# Patient Record
Sex: MALE | Race: WHITE | NOT HISPANIC OR LATINO | Employment: OTHER | URBAN - METROPOLITAN AREA
[De-identification: names, ages, dates, MRNs, and addresses within clinical notes are randomized per-mention and may not be internally consistent; named-entity substitution may affect disease eponyms.]

---

## 2017-02-07 ENCOUNTER — ALLSCRIPTS OFFICE VISIT (OUTPATIENT)
Dept: OTHER | Facility: OTHER | Age: 72
End: 2017-02-07

## 2017-05-01 ENCOUNTER — TRANSCRIBE ORDERS (OUTPATIENT)
Dept: LAB | Facility: CLINIC | Age: 72
End: 2017-05-01

## 2017-05-01 ENCOUNTER — APPOINTMENT (OUTPATIENT)
Dept: LAB | Facility: CLINIC | Age: 72
End: 2017-05-01
Payer: COMMERCIAL

## 2017-05-01 DIAGNOSIS — K21.9 GASTRO-ESOPHAGEAL REFLUX DISEASE WITHOUT ESOPHAGITIS: ICD-10-CM

## 2017-05-01 DIAGNOSIS — E78.00 PURE HYPERCHOLESTEROLEMIA: ICD-10-CM

## 2017-05-01 DIAGNOSIS — E11.9 TYPE 2 DIABETES MELLITUS WITHOUT COMPLICATIONS (HCC): ICD-10-CM

## 2017-05-01 DIAGNOSIS — E78.5 HYPERLIPIDEMIA: ICD-10-CM

## 2017-05-01 DIAGNOSIS — I10 ESSENTIAL (PRIMARY) HYPERTENSION: ICD-10-CM

## 2017-05-01 LAB
CHOLEST SERPL-MCNC: 148 MG/DL (ref 50–200)
EST. AVERAGE GLUCOSE BLD GHB EST-MCNC: 134 MG/DL
HBA1C MFR BLD: 6.3 % (ref 4.2–6.3)
HDLC SERPL-MCNC: 37 MG/DL (ref 40–60)
LDLC SERPL CALC-MCNC: 91 MG/DL (ref 0–100)
TRIGL SERPL-MCNC: 99 MG/DL

## 2017-05-01 PROCEDURE — 80061 LIPID PANEL: CPT

## 2017-05-01 PROCEDURE — 83036 HEMOGLOBIN GLYCOSYLATED A1C: CPT

## 2017-05-01 PROCEDURE — 36415 COLL VENOUS BLD VENIPUNCTURE: CPT

## 2017-05-02 ENCOUNTER — GENERIC CONVERSION - ENCOUNTER (OUTPATIENT)
Dept: OTHER | Facility: OTHER | Age: 72
End: 2017-05-02

## 2017-05-09 ENCOUNTER — ALLSCRIPTS OFFICE VISIT (OUTPATIENT)
Dept: OTHER | Facility: OTHER | Age: 72
End: 2017-05-09

## 2017-09-12 ENCOUNTER — GENERIC CONVERSION - ENCOUNTER (OUTPATIENT)
Dept: OTHER | Facility: OTHER | Age: 72
End: 2017-09-12

## 2017-11-08 ENCOUNTER — GENERIC CONVERSION - ENCOUNTER (OUTPATIENT)
Dept: FAMILY MEDICINE CLINIC | Facility: CLINIC | Age: 72
End: 2017-11-08

## 2017-11-08 LAB
LEFT EYE DIABETIC RETINOPATHY: NORMAL
RIGHT EYE DIABETIC RETINOPATHY: NORMAL

## 2017-11-09 DIAGNOSIS — E78.00 PURE HYPERCHOLESTEROLEMIA: ICD-10-CM

## 2017-11-09 DIAGNOSIS — E11.9 TYPE 2 DIABETES MELLITUS WITHOUT COMPLICATIONS (HCC): ICD-10-CM

## 2017-11-09 DIAGNOSIS — I10 ESSENTIAL (PRIMARY) HYPERTENSION: ICD-10-CM

## 2017-11-09 DIAGNOSIS — Z12.5 ENCOUNTER FOR SCREENING FOR MALIGNANT NEOPLASM OF PROSTATE: ICD-10-CM

## 2017-11-12 DIAGNOSIS — I10 ESSENTIAL (PRIMARY) HYPERTENSION: ICD-10-CM

## 2017-11-12 DIAGNOSIS — E11.9 TYPE 2 DIABETES MELLITUS WITHOUT COMPLICATIONS (HCC): ICD-10-CM

## 2017-11-12 DIAGNOSIS — E78.00 PURE HYPERCHOLESTEROLEMIA: ICD-10-CM

## 2018-01-02 ENCOUNTER — TRANSCRIBE ORDERS (OUTPATIENT)
Dept: LAB | Facility: CLINIC | Age: 73
End: 2018-01-02

## 2018-01-02 ENCOUNTER — APPOINTMENT (OUTPATIENT)
Dept: LAB | Facility: CLINIC | Age: 73
End: 2018-01-02
Payer: COMMERCIAL

## 2018-01-02 DIAGNOSIS — E11.9 TYPE 2 DIABETES MELLITUS WITHOUT COMPLICATIONS (HCC): ICD-10-CM

## 2018-01-02 DIAGNOSIS — Z12.5 ENCOUNTER FOR SCREENING FOR MALIGNANT NEOPLASM OF PROSTATE: ICD-10-CM

## 2018-01-02 DIAGNOSIS — E78.00 PURE HYPERCHOLESTEROLEMIA: ICD-10-CM

## 2018-01-02 DIAGNOSIS — I10 ESSENTIAL (PRIMARY) HYPERTENSION: ICD-10-CM

## 2018-01-02 LAB
ALBUMIN SERPL BCP-MCNC: 4 G/DL (ref 3.5–5)
ALP SERPL-CCNC: 69 U/L (ref 46–116)
ALT SERPL W P-5'-P-CCNC: 37 U/L (ref 12–78)
ANION GAP SERPL CALCULATED.3IONS-SCNC: 6 MMOL/L (ref 4–13)
AST SERPL W P-5'-P-CCNC: 26 U/L (ref 5–45)
BASOPHILS # BLD AUTO: 0.04 THOUSANDS/ΜL (ref 0–0.1)
BASOPHILS NFR BLD AUTO: 1 % (ref 0–1)
BILIRUB SERPL-MCNC: 0.66 MG/DL (ref 0.2–1)
BUN SERPL-MCNC: 21 MG/DL (ref 5–25)
CALCIUM SERPL-MCNC: 9.5 MG/DL (ref 8.3–10.1)
CHLORIDE SERPL-SCNC: 104 MMOL/L (ref 100–108)
CHOLEST SERPL-MCNC: 143 MG/DL (ref 50–200)
CO2 SERPL-SCNC: 29 MMOL/L (ref 21–32)
CREAT SERPL-MCNC: 1.08 MG/DL (ref 0.6–1.3)
CREAT UR-MCNC: 85 MG/DL
EOSINOPHIL # BLD AUTO: 0.35 THOUSAND/ΜL (ref 0–0.61)
EOSINOPHIL NFR BLD AUTO: 5 % (ref 0–6)
ERYTHROCYTE [DISTWIDTH] IN BLOOD BY AUTOMATED COUNT: 13 % (ref 11.6–15.1)
EST. AVERAGE GLUCOSE BLD GHB EST-MCNC: 137 MG/DL
GFR SERPL CREATININE-BSD FRML MDRD: 69 ML/MIN/1.73SQ M
GLUCOSE P FAST SERPL-MCNC: 115 MG/DL (ref 65–99)
HBA1C MFR BLD: 6.4 % (ref 4.2–6.3)
HCT VFR BLD AUTO: 47.2 % (ref 36.5–49.3)
HDLC SERPL-MCNC: 39 MG/DL (ref 40–60)
HGB BLD-MCNC: 16 G/DL (ref 12–17)
LDLC SERPL CALC-MCNC: 85 MG/DL (ref 0–100)
LYMPHOCYTES # BLD AUTO: 1.8 THOUSANDS/ΜL (ref 0.6–4.47)
LYMPHOCYTES NFR BLD AUTO: 27 % (ref 14–44)
MCH RBC QN AUTO: 30.2 PG (ref 26.8–34.3)
MCHC RBC AUTO-ENTMCNC: 33.9 G/DL (ref 31.4–37.4)
MCV RBC AUTO: 89 FL (ref 82–98)
MICROALBUMIN UR-MCNC: 18.5 MG/L (ref 0–20)
MICROALBUMIN/CREAT 24H UR: 22 MG/G CREATININE (ref 0–30)
MONOCYTES # BLD AUTO: 0.52 THOUSAND/ΜL (ref 0.17–1.22)
MONOCYTES NFR BLD AUTO: 8 % (ref 4–12)
NEUTROPHILS # BLD AUTO: 4.01 THOUSANDS/ΜL (ref 1.85–7.62)
NEUTS SEG NFR BLD AUTO: 59 % (ref 43–75)
NRBC BLD AUTO-RTO: 0 /100 WBCS
PLATELET # BLD AUTO: 307 THOUSANDS/UL (ref 149–390)
PMV BLD AUTO: 11.4 FL (ref 8.9–12.7)
POTASSIUM SERPL-SCNC: 4.3 MMOL/L (ref 3.5–5.3)
PROT SERPL-MCNC: 8.3 G/DL (ref 6.4–8.2)
PSA SERPL-MCNC: 0.4 NG/ML (ref 0–4)
RBC # BLD AUTO: 5.3 MILLION/UL (ref 3.88–5.62)
SODIUM SERPL-SCNC: 139 MMOL/L (ref 136–145)
TRIGL SERPL-MCNC: 97 MG/DL
WBC # BLD AUTO: 6.73 THOUSAND/UL (ref 4.31–10.16)

## 2018-01-02 PROCEDURE — 83036 HEMOGLOBIN GLYCOSYLATED A1C: CPT

## 2018-01-02 PROCEDURE — 82570 ASSAY OF URINE CREATININE: CPT

## 2018-01-02 PROCEDURE — 36415 COLL VENOUS BLD VENIPUNCTURE: CPT

## 2018-01-02 PROCEDURE — 85025 COMPLETE CBC W/AUTO DIFF WBC: CPT

## 2018-01-02 PROCEDURE — 80053 COMPREHEN METABOLIC PANEL: CPT

## 2018-01-02 PROCEDURE — 82043 UR ALBUMIN QUANTITATIVE: CPT

## 2018-01-02 PROCEDURE — 80061 LIPID PANEL: CPT

## 2018-01-02 PROCEDURE — G0103 PSA SCREENING: HCPCS

## 2018-01-04 ENCOUNTER — GENERIC CONVERSION - ENCOUNTER (OUTPATIENT)
Dept: OTHER | Facility: OTHER | Age: 73
End: 2018-01-04

## 2018-01-09 ENCOUNTER — ALLSCRIPTS OFFICE VISIT (OUTPATIENT)
Dept: OTHER | Facility: OTHER | Age: 73
End: 2018-01-09

## 2018-01-10 NOTE — PROGRESS NOTES
Assessment    1  Type 2 diabetes mellitus (250 00) (E11 9)   2  Hypercholesterolemia (272 0) (E78 0)   3  Benign essential hypertension (401 1) (I10)   4  Morbidly obese (278 01) (E66 01)    Plan  Benign essential hypertension    · Lisinopril-Hydrochlorothiazide 20-12 5 MG Oral Tablet; take 1 tablet by mouth once daily  Type 2 diabetes mellitus    · MetFORMIN HCl - 500 MG Oral Tablet; 1 TAB PO QD   · *VB-Foot Exam; Status:Resulted - Requires Verification;   Done: 80RQL7754 12:00AM   · *VB-Foot Exam ; every 1 year; Last 27DHD3448; Next 47EXK7274; Status:Active    Discussion/Summary  Discussion Summary:   [address] will follow up in the next 4 months and see how he does with weight loss  Counseling Documentation With Imm: The patient was counseled regarding diagnostic results, instructions for management, risks and benefits of treatment options  total time of encounter was 25 minutes and 15 minutes was spent counseling  Chief Complaint  Chief Complaint Free Text Note Form: Pt is here for a RTN  PT is up to date on his, lab work, DM foot exam, colonoscopy, fall risk, A1C , and urine albumin  Pt has no complaints         History of Present Illness  HPI: The patient is here for follow up and we discussed his DM control noting the her HgbA1c was mildly elevated  The patient states that he is trying to loose weight  He notes that he has issues with weight loss  We discussed the use of Metformin to help with weight loss and his DM  We discussed increased activity to help with weight loss  The patient states that there are no other concerns at this time  We reviewed the labs  The patient's BP is good today and he is tolerating his Lisinopril/HCTZ  Review of Systems  Complete-Male:   Constitutional: no fever, not feeling poorly and not feeling tired  Eyes: No complaints of eye pain, no red eyes, no discharge from eyes, no itchy eyes  ENT: no sore throat and no nasal discharge     Cardiovascular: no chest pain and no palpitations  Respiratory: no shortness of breath, no cough, no orthopnea and no shortness of breath during exertion  Gastrointestinal: no abdominal pain, no nausea, no vomiting and no diarrhea  Genitourinary: No complaints of dysuria, no incontinence, no hesitancy, no nocturia, no genital lesion, no testicular pain  Musculoskeletal: no arthralgias and no myalgias  Neurological: no headache, no numbness, no tingling and no dizziness  Psychiatric: Is not suicidal, no sleep disturbances, no anxiety or depression, no change in personality, no emotional problems  Endocrine: No complaints of proptosis, no hot flashes, no muscle weakness, no erectile dysfunction, no deepening of the voice, no feelings of weakness  Active Problems    1  Allergic rhinitis (477 9) (J30 9)   2  Benign essential hypertension (401 1) (I10)   3  Enlarged prostate with lower urinary tract symptoms (LUTS) (600 01) (N40 1)   4  Erectile dysfunction of non-organic origin (302 72) (F52 21)   5  Hypercholesterolemia (272 0) (E78 0)   6  Hyperlipidemia (272 4) (E78 5)   7  Peyronie's disease (607 85) (N48 6)   8  Type 2 diabetes mellitus (250 00) (E11 9)    Past Medical History    1  History of Foot pain, unspecified laterality (729 5) (M79 673)   2  History of sebaceous cyst (V13 3) (Z87 2)   3  History of Papular rash (782 1) (R21)   4  History of Testicular hypogonadism (257 2) (E29 1)  Active Problems And Past Medical History Reviewed: The active problems and past medical history were reviewed and updated today  Surgical History  Surgical History Reviewed: The surgical history was reviewed and updated today  Family History    1  Family history of Type 2 diabetes mellitus (250 00) (E11 9)  Family History Reviewed: The family history was reviewed and updated today  Social History    · Former smoker (K18 81) (E07 434)  Social History Reviewed: The social history was reviewed and updated today   The social history was reviewed and is unchanged  Current Meds   1  Aspirin 81 MG Oral Tablet Recorded   2  [facility] Fish Oil CAPS; Therapy: (Recorded:30Oct2013) to Recorded   3  Lisinopril-Hydrochlorothiazide 20-12 5 MG Oral Tablet; take 1 tablet by mouth once daily  Requested   for: 32EGH8650; Last Rx:33Jqz8920 Ordered   4  Nasacort Allergy 24HR 55 MCG/ACT Nasal Aerosol; Use a directed on the packaging; Therapy: 07SKN0185 to (Evaluate:75Arn4485); Last Rx:21Jln5470 Ordered   5  Omega-3-acid Ethyl Esters 1 GM Oral Capsule; Therapy: 27VSD6992 to Recorded   6  OneTouch Ultra Blue In [employer]; USE 1 STRIP DAILY; Therapy: 11VTP1862 to (Evaluate:42Uvw8679)  Requested for: 41VES8148; Last Rx:20Zfe2034   Ordered  Medication List Reviewed: The medication list was reviewed and updated today  Allergies    1  Statins    2  Seasonal    Vitals  Vital Signs [Data Includes: Current Encounter]    Recorded: 53Zqb0667 08:23AM   Temperature 96 9 F   Heart Rate 66   Respiration 17   Systolic 430   Diastolic 82   Height 5 ft 9 in   Weight 239 lb    BMI Calculated 35 29   BSA Calculated 2 23     Physical Exam    Constitutional   General appearance: No acute distress, well appearing and well nourished  Eyes   Conjunctiva and lids: No swelling, erythema, or discharge  Ears, Nose, Mouth, and Throat   External inspection of ears and nose: Normal     Oropharynx: Normal with no erythema, edema, exudate or lesions  Pulmonary   Respiratory effort: No increased work of breathing or signs of respiratory distress  Cardiovascular   Palpation of heart: Normal PMI, no thrills  Auscultation of heart: Normal rate and rhythm, normal S1 and S2, without murmurs  Abdomen   Abdomen: Non-tender, no masses  Lymphatic   Palpation of lymph nodes in neck: No lymphadenopathy  Musculoskeletal   Gait and station: Normal     Skin   Skin and subcutaneous tissue: Normal without rashes or lesions      Neurologic   Cranial nerves: Cranial nerves 2-12 intact  Psychiatric   Mood and affect: Normal     Diabetic Foot Screen: Abnormal     Socks and shoes removed, the Right Foot: the foot was not swollen, not tender, not erythematous, not warm, not dry, without maceration, without pre-ulcers and without a callus  The toes on the right were not swollen and not erythematous  The sensory exam showed  normal vibratory sensation at the level of the toes on the right  Normal tactile sensation with monofilament testing throughout the right foot  Socks and shoes removed, Left Foot: the foot was with a(n) 1 on dorsum of foot cm3 ulcer, but not swollen, not tender, not erythematous, not warm, not dry, wtihout maceration, without pre-ulcers and without a callus  The toes on the left were not swollen and not erythematous  The sensory exam showed  normal vibratory sensation at the level of the toes on the left , Normal tactile sensation with monofilament testing throughout the left foot  Capillary refills findings on the right were normal in the toes  Pulses:   2+ in the posterior tibialis on the right   2+ in the dorsalis pedis on the right  Capillary refills findings on the left were normal in the toes  Pulses:   2+ in the posterior tibialis on the left   2+ in the dorsalis pedis on the left  Assign Risk Category: 0: No loss of protective sensation, no deformity  No present risk  Health Management  History of Screening for colon cancer   (1) OCCULT BLOOD,FECES(SCREEN); every 1 year; Last 70Jzs9302; Next Due: 09BQT6698; Overdue  History of Screening for prostate cancer   (1) PSA (SCREEN) (Dx V76 44 Screen for Prostate Cancer); every 1 year; Last 56AZO5571; Next  Due: 27KQG8393; Active  Type 2 diabetes mellitus   (1) HEMOGLOBIN A1C; every 6 months; Last 34HBI2630; Next Due: 99Xfl0933; Active  (1) MICROALBUMIN CREATININE RATIO, RANDOM URINE; every 1 year; Last 31AGB4671; Next Due:  99ROG9353; Active  *VB - Eye Exam; every 1 year;  Last 76WJT2251; Next Due: G3978508; Overdue  *VB-Foot Exam; every 1 year; Last 28ZKB3349; Next Due: 96Jqh2067; Active  Health Maintenance   Medicare Annual Wellness Visit; every 1 year; Last 14CEH5180; Next Due: 12LDK8440;  Active    Signatures   Electronically signed by : Verna Paniagua DO; Feb 2 2016  9:18AM EST                       (Author)

## 2018-01-10 NOTE — RESULT NOTES
Verified Results  (1) MICROALBUMIN CREATININE RATIO, RANDOM URINE 31Aug2016 01:43PM Destiny Lundberg Order Number: [id number]     Test Name Result Flag Reference   MICROALBUMIN/ CREAT R 24 mg/g creatinine  0-30   MICROALBUMIN,URINE 50 4 mg/L H 0 0-20 0   CREATININE URINE 207 0 mg/dL         Plan  Type 2 diabetes mellitus    · (1) HEMOGLOBIN A1C ; every 6 months; Last 31Aug2016; Status:Active   · (1) MICROALBUMIN CREATININE RATIO, RANDOM URINE ; every 1 year;  Last  31Aug2016; Next 31Aug2017; Status:Active

## 2018-01-12 NOTE — RESULT NOTES
Verified Results  (1) CBC/PLT/DIFF 22Jan2016 08:27AM Alfred Farah     Test Name Result Flag Reference   WBC COUNT 6 25 Thousand/uL  4 31-10 16   RBC COUNT 5 43 Million/uL  3 88-5 62   HEMOGLOBIN 16 0 g/dL  12 0-17 0   HEMATOCRIT 47 6 %  36 5-49 3   MCV 87 7 fL  82 0-98 0   MCH 29 5 pg  26 8-34 3   MCHC 33 6 g/dL  31 4-37 4   RDW 13 0 %  11 6-15 1   MPV 11 3 fL  8 9-12 7   PLATELET COUNT 148 Thousands/uL  149-390   NEUTROPHILS RELATIVE PERCENT 57 %  43-75   LYMPHOCYTES RELATIVE PERCENT 28 %  14-44   MONOCYTES RELATIVE PERCENT 8 %  4-12   EOSINOPHILS RELATIVE PERCENT 6 %  0-6   BASOPHILS RELATIVE PERCENT 1 %  0-1   NEUTROPHILS ABSOLUTE COUNT 3 60 Thousands/µL  1 85-7 62   LYMPHOCYTES ABSOLUTE COUNT 1 76 Thousands/µL  0 60-4 47   MONOCYTES ABSOLUTE COUNT 0 49 Thousand/µL  0 17-1 22   EOSINOPHILS ABSOLUTE COUNT 0 36 Thousand/µL  0 00-0 61   BASOPHILS ABSOLUTE COUNT 0 04 Thousands/µL  0 00-0 10     (1) LIPID PANEL FASTING W DIRECT LDL REFLEX 22Jan2016 08:27ANAM Farah   Triglyceride:         Normal              <150 mg/dl       Borderline High    150-199 mg/dl       High               200-499 mg/dl       Very High          >499 mg/dl  Cholesterol:         Desirable        <200 mg/dl      Borderline High  200-239 mg/dl      High             >239 mg/dl  HDL Cholesterol:        High    >59 mg/dL      Low     <41 mg/dL  LDL Cholesterol:        Optimal          <100 mg/dl         Near Optimal     100-129 mg/dl        Above Optimal          Borderline High   130-159 mg/dl          High              160-189 mg/dl          Very High        >189 mg/dl  LDL CALCULATED:    This screening LDL is a calculated result  It does not have the accuracy of the Direct Measured LDL in the monitoring of patients with hyperlipidemia and/or statin therapy  Direct Measure LDL (DEB419) must be ordered separately in these patients       Test Name Result Flag Reference   CHOLESTEROL 154 mg/dL     LDL CHOLESTEROL CALCULATED 99 mg/dL 0-100   TRIGLYCERIDES 92 mg/dL  <=150   HDL,DIRECT 37 mg/dL L 40-60     (1) COMPREHENSIVE METABOLIC PANEL 84YAM3748 87:87LB [facility] Kidney Disease Education Program recommendations are as follows:  GFR calculation is accurate only with a steady state creatinine  Chronic Kidney disease less than 60 ml/min/1 73 sq  meters  Kidney failure less than 15 ml/min/1 73 sq  meters  Test Name Result Flag Reference   GLUCOSE,RANDM 129 mg/dL     If the patient is fasting, the ADA then defines impaired fasting glucose as > 100 mg/dL and diabetes as > or equal to 123 mg/dL     SODIUM 137 mmol/L  136-145   POTASSIUM 4 5 mmol/L  3 5-5 3   CHLORIDE 100 mmol/L  100-108   CARBON DIOXIDE 29 mmol/L  21-32   ANION GAP (CALC) 8 mmol/L  4-13   BLOOD UREA NITROGEN 18 mg/dL  5-25   CREATININE 0 98 mg/dL  0 60-1 30   Standardized to IDMS reference method   CALCIUM 9 5 mg/dL  8 3-10 1   BILI, TOTAL 0 60 mg/dL  0 20-1 00   ALK PHOSPHATAS 82 U/L     ALT (SGPT) 66 U/L  12-78   AST(SGOT) 29 U/L  5-45   ALBUMIN 4 0 g/dL  3 5-5 0   TOTAL PROTEIN 8 0 g/dL  6 4-8 2   eGFR Non-African American      >60 0 ml/min/1 73sq m     (1) PSA (SCREEN) (Dx V76 44 Screen for Prostate Cancer) 63SEI9167 08:27AM [place]     Test Name Result Flag Reference   PROSTATE SPECIFIC ANTIGEN 0 4 ng/mL  0 0-4 0     (1) MICROALBUMIN CREATININE RATIO, RANDOM URINE 22NLA4936 08:27AM [facility]     Test Name Result Flag Reference   MICROALBUMIN/ CREAT R 38 mg/g creatinine H 0-30   MICROALBUMIN,URINE 17 0 mg/L  0 0-20 0   CREATININE URINE 44 7 mg/dL       (1) HEMOGLOBIN A1C 22Jan2016 08:27AM [place]   5 7-6 4% impaired fasting glucose  >=6 5% diagnosis of diabetes    Falsely low levels are seen in conditions linked to short RBC life span-  hemolytic anemia, and splenomegaly  Falsely elevated levels are seen in situations where there is an increased production of RBC- receipt of erythropoietin or blood transfusions  Adopted from ADA-Clinical Practice Recommendations     Test Name Result Flag Reference   HEMOGLOBIN A1C 7 2 % H 4 0-5 6   EST  AVG  GLUCOSE 160 mg/dl         Plan  Screening for prostate cancer    · (1) PSA (SCREEN) (Dx V76 44 Screen for Prostate Cancer) ; every 1 year; Last  50ZJT7385; Next 73KNT3385; Status:Active  Type 2 diabetes mellitus    · (1) HEMOGLOBIN A1C ; every 6 months; Last 30RRG2993; Next 77Rrz0112; Status:Active   · (1) MICROALBUMIN CREATININE RATIO, RANDOM URINE ; every 1 year;  Last  85LQM4934; Next 15GWH7854; Status:Active

## 2018-01-13 VITALS
SYSTOLIC BLOOD PRESSURE: 128 MMHG | RESPIRATION RATE: 18 BRPM | DIASTOLIC BLOOD PRESSURE: 82 MMHG | BODY MASS INDEX: 34.99 KG/M2 | TEMPERATURE: 97 F | WEIGHT: 236.25 LBS | OXYGEN SATURATION: 94 % | HEIGHT: 69 IN | HEART RATE: 68 BPM

## 2018-01-15 VITALS
SYSTOLIC BLOOD PRESSURE: 110 MMHG | DIASTOLIC BLOOD PRESSURE: 70 MMHG | WEIGHT: 222.25 LBS | TEMPERATURE: 98 F | HEART RATE: 72 BPM | HEIGHT: 69 IN | RESPIRATION RATE: 16 BRPM | BODY MASS INDEX: 32.92 KG/M2

## 2018-01-16 NOTE — RESULT NOTES
Verified Results  (1) HEMOGLOBIN A1C 54GJL9159 07:03AM Pedro Blank Order Number: [id number]     Test Name Result Flag Reference   HEMOGLOBIN A1C 6 3 %  4 2-6 3   EST  AVG  GLUCOSE 134 mg/dl       (1) LIPID PANEL FASTING W DIRECT LDL REFLEX 74ALC9831 07:03AM Pedro Blank Order Number: [id number]     Test Name Result Flag Reference   CHOLESTEROL 148 mg/dL     LDL CHOLESTEROL CALCULATED 91 mg/dL  0-100   - Patient Instructions: This is a fasting blood test  Water, black tea or black coffee only after 9:00pm the night before test   Drink 2 glasses of water the morning of test       Triglyceride:         Normal              <150 mg/dl       Borderline High    150-199 mg/dl       High               200-499 mg/dl       Very High          >499 mg/dl  Cholesterol:         Desirable        <200 mg/dl      Borderline High  200-239 mg/dl      High             >239 mg/dl  HDL Cholesterol:        High    >59 mg/dL      Low     <41 mg/dL  LDL Cholesterol:        Optimal          <100 mg/dl        Near Optimal     100-129 mg/dl        Above Optimal          Borderline High   130-159 mg/dl          High              160-189 mg/dl          Very High        >189 mg/dl  LDL CALCULATED:    This screening LDL is a calculated result  It does not have the accuracy of the Direct Measured LDL in the monitoring of patients with hyperlipidemia and/or statin therapy  Direct Measure LDL (GYT800) must be ordered separately in these patients  TRIGLYCERIDES 99 mg/dL  <=150   Specimen collection should occur prior to N-Acetylcysteine or Metamizole administration due to the potential for falsely depressed results  HDL,DIRECT 37 mg/dL L 40-60   Specimen collection should occur prior to Metamizole administration due to the potential for falsely depressed results  Plan  Type 2 diabetes mellitus    · (1) HEMOGLOBIN A1C ; every 6 months;  Last 33ANF5876; Next 90SMD1351;  Status:Active

## 2018-01-17 NOTE — RESULT NOTES
Verified Results  * XR KNEE 4+ VW RIGHT INJURY 05DUC9182 10:53AM [place] Order Number: [id number]     Test Name Result Flag Reference   XR KNEE 4+ VW RIGHT (Report)     RIGHT KNEE     INDICATION: pt states pain in rt knee for 1 week, uncertain as to trauma, pain in medial aspect of rt knee     COMPARISON: None     VIEWS: 4; 4 images     FINDINGS:     There is no acute fracture or dislocation  There is no joint effusion  Moderate medial compartment degenerative changes with moderate joint space narrowing  No lytic or blastic lesions are seen  Soft tissues are unremarkable  IMPRESSION:     Moderate medial compartment degenerative changes with moderate joint space narrowing         Workstation performed: [id number]     Signed by:   Shakira Zhang MD   11/15/16

## 2018-01-18 NOTE — RESULT NOTES
Verified Results  (1) LYME ANTIBODY PROFILE [facility] TO [facility]ST[facility]OT 08WMS7547 09:39AM Mireya Toribio Order Number: [id number]     Test Name Result Flag Reference   LYME IGG 0 11  0 00-0 79   NEGATIVE(0 00-0 79)-Absence of detectable Borrelia IgG Antibodies  A negative result does not exclude the possibility of Borrelia infection  If early Lyme disease is suspected,a second sample should be collected & tested 4 weeks after initial testing  LYME IGM 0 51  0 00-0 79   NEGATIVE (0 00-0 79)-Absence of detectable Borrelia IgM antibodies  A negative result does not exclude the possibility of Borrelia infection  If early lyme disease is suspected, a second sample should be collected & tested 4 weeks after initial testing

## 2018-01-22 VITALS
TEMPERATURE: 97.6 F | WEIGHT: 222 LBS | HEART RATE: 60 BPM | SYSTOLIC BLOOD PRESSURE: 132 MMHG | HEIGHT: 69 IN | BODY MASS INDEX: 32.88 KG/M2 | RESPIRATION RATE: 16 BRPM | DIASTOLIC BLOOD PRESSURE: 70 MMHG

## 2018-01-22 VITALS
HEART RATE: 60 BPM | HEIGHT: 69 IN | RESPIRATION RATE: 16 BRPM | WEIGHT: 217.25 LBS | TEMPERATURE: 97.6 F | DIASTOLIC BLOOD PRESSURE: 84 MMHG | SYSTOLIC BLOOD PRESSURE: 130 MMHG | BODY MASS INDEX: 32.18 KG/M2

## 2018-01-23 NOTE — RESULT NOTES
Verified Results  (1) CBC/PLT/DIFF 25HWB1306 08:19AM Nona [facility] Order Number: [id number]     Test Name Result Flag Reference   WBC COUNT 6 73 Thousand/uL  4 31-10 16   RBC COUNT 5 30 Million/uL  3 88-5 62   HEMOGLOBIN 16 0 g/dL  12 0-17 0   HEMATOCRIT 47 2 %  36 5-49 3   MCV 89 fL  82-98   MCH 30 2 pg  26 8-34 3   MCHC 33 9 g/dL  31 4-37 4   RDW 13 0 %  11 6-15 1   MPV 11 4 fL  8 9-12 7   PLATELET COUNT 832 Thousands/uL  149-390   nRBC AUTOMATED 0 /100 WBCs     NEUTROPHILS RELATIVE PERCENT 59 %  43-75   LYMPHOCYTES RELATIVE PERCENT 27 %  14-44   MONOCYTES RELATIVE PERCENT 8 %  4-12   EOSINOPHILS RELATIVE PERCENT 5 %  0-6   BASOPHILS RELATIVE PERCENT 1 %  0-1   NEUTROPHILS ABSOLUTE COUNT 4 01 Thousands/? ??L  1 85-7 62   LYMPHOCYTES ABSOLUTE COUNT 1 80 Thousands/? ??L  0 60-4 47   MONOCYTES ABSOLUTE COUNT 0 52 Thousand/? ??L  0 17-1 22   EOSINOPHILS ABSOLUTE COUNT 0 35 Thousand/? ??L  0 00-0 61   BASOPHILS ABSOLUTE COUNT 0 04 Thousands/? ??L  0 00-0 10     (1) COMPREHENSIVE METABOLIC PANEL 13HOZ9017 93:90PY Nona [facility] Order Number: [id number]     Test Name Result Flag Reference   SODIUM 139 mmol/L  136-145   POTASSIUM 4 3 mmol/L  3 5-5 3   CHLORIDE 104 mmol/L  100-108   CARBON DIOXIDE 29 mmol/L  21-32   ANION GAP (CALC) 6 mmol/L  4-13   BLOOD UREA NITROGEN 21 mg/dL  5-25   CREATININE 1 08 mg/dL  0 60-1 30   Standardized to IDMS reference method   CALCIUM 9 5 mg/dL  8 3-10 1   BILI, TOTAL 0 66 mg/dL  0 20-1 00   ALK PHOSPHATAS 69 U/L     ALT (SGPT) 37 U/L  12-78   Specimen collection should occur prior to Sulfasalazine and/or Sulfapyridine administration due to the potential for falsely depressed results  AST(SGOT) 26 U/L  5-45   Specimen collection should occur prior to Sulfasalazine administration due to the potential for falsely depressed results     ALBUMIN 4 0 g/dL  3 5-5 0   TOTAL PROTEIN 8 3 g/dL H 6 4-8 2   eGFR 69 ml/min/1 73sq m     National Kidney Disease Education Program recommendations are as follows:  GFR calculation is accurate only with a steady state creatinine  Chronic Kidney disease less than 60 ml/min/1 73 sq  meters  Kidney failure less than 15 ml/min/1 73 sq  meters  GLUCOSE FASTING 115 mg/dL H 65-99   Specimen collection should occur prior to Sulfasalazine administration due to the potential for falsely depressed results  Specimen collection should occur prior to Sulfapyridine administration due to the potential for falsely elevated results  (1) LIPID PANEL, FASTING 02Jan2018 08:19AM [employer] Order Number: [id number]     Test Name Result Flag Reference   CHOLESTEROL 143 mg/dL     HDL,DIRECT 39 mg/dL L 40-60   Specimen collection should occur prior to Metamizole administration due to the potential for falsley depressed results  LDL CHOLESTEROL CALCULATED 85 mg/dL  0-100   Triglyceride:        Normal <150 mg/dl   Borderline High 150-199 mg/dl   High 200-499 mg/dl   Very High >499 mg/dl      Cholesterol:       Desirable <200 mg/dl    Borderline High 200-239 mg/dl    High >239 mg/dl      HDL Cholesterol:       High>59 mg/dL    Low <41 mg/dL      This screening LDL is a calculated result  It does not have the accuracy of the Direct Measured LDL in the monitoring of patients with hyperlipidemia and/or statin therapy  Direct Measure LDL (KZM115) must be ordered separately in these patients  TRIGLYCERIDES 97 mg/dL  <=150   Specimen collection should occur prior to N-Acetylcysteine or Metamizole administration due to the potential for falsely depressed results  (1) HEMOGLOBIN A1C 85AGR2913 08:19AM Cristhian Beau Order Number: [id number]     Test Name Result Flag Reference   HEMOGLOBIN A1C 6 4 % H 4 2-6 3   EST  AVG   GLUCOSE 137 mg/dl       (1) PSA (SCREEN) (Dx V76 44 Screen for Prostate Cancer) 54RJD0394 08:19AM Cristhian Beau Order Number: [id number]     Test Name Result Flag Reference   PROSTATE SPECIFIC ANTIGEN 0 4 ng/mL  0 0-4 0   American Urological Association Guidelines define biochemical recurrence of prostate cancer as a detectable or rising PSA value post-radical prostatectomy that is greater than or equal to 0 2 ng/mL with a second confirmatory level of greater than or equal to 0 2 ng/mL  (1) MICROALBUMIN CREATININE RATIO, RANDOM URINE 40YNF8580 08:19AM Ludin Galeano Order Number: [id number]     Test Name Result Flag Reference   MICROALBUMIN/ CREAT R 22 mg/g creatinine  0-30   MICROALBUMIN,URINE 18 5 mg/L  0 0-20 0   CREATININE URINE 85 0 mg/dL         Plan  PMH: Screening for prostate cancer    · (1) PSA (SCREEN) (Dx V76 44 Screen for Prostate Cancer) ; every 1 year; Last  92HLS2240; Next 75CZB3262; Status:Active  Type 2 diabetes mellitus    · (1) HEMOGLOBIN A1C ; every 6 months; Last 72ZSC6209; Next 05QQX9883; Status:Active   · (1) MICROALBUMIN CREATININE RATIO, RANDOM URINE ; every 1 year; Last  61LIF4518;  Next [id number]; Status:Active

## 2018-05-08 ENCOUNTER — TELEPHONE (OUTPATIENT)
Dept: INTERNAL MEDICINE CLINIC | Facility: CLINIC | Age: 73
End: 2018-05-08

## 2018-05-08 ENCOUNTER — APPOINTMENT (OUTPATIENT)
Dept: LAB | Facility: CLINIC | Age: 73
End: 2018-05-08
Payer: COMMERCIAL

## 2018-05-08 ENCOUNTER — OFFICE VISIT (OUTPATIENT)
Dept: FAMILY MEDICINE CLINIC | Facility: CLINIC | Age: 73
End: 2018-05-08
Payer: COMMERCIAL

## 2018-05-08 ENCOUNTER — TRANSCRIBE ORDERS (OUTPATIENT)
Dept: LAB | Facility: CLINIC | Age: 73
End: 2018-05-08

## 2018-05-08 VITALS
RESPIRATION RATE: 16 BRPM | TEMPERATURE: 97.9 F | HEART RATE: 52 BPM | BODY MASS INDEX: 33.33 KG/M2 | HEIGHT: 69 IN | SYSTOLIC BLOOD PRESSURE: 116 MMHG | DIASTOLIC BLOOD PRESSURE: 74 MMHG | WEIGHT: 225 LBS

## 2018-05-08 DIAGNOSIS — E11.9 TYPE 2 DIABETES MELLITUS WITHOUT COMPLICATION, WITHOUT LONG-TERM CURRENT USE OF INSULIN (HCC): ICD-10-CM

## 2018-05-08 DIAGNOSIS — E78.00 HYPERCHOLESTEROLEMIA: ICD-10-CM

## 2018-05-08 DIAGNOSIS — E11.9 TYPE 2 DIABETES MELLITUS WITHOUT COMPLICATION, WITHOUT LONG-TERM CURRENT USE OF INSULIN (HCC): Primary | ICD-10-CM

## 2018-05-08 DIAGNOSIS — I10 ESSENTIAL HYPERTENSION: ICD-10-CM

## 2018-05-08 PROBLEM — K21.9 ESOPHAGEAL REFLUX: Status: ACTIVE | Noted: 2017-02-07

## 2018-05-08 PROBLEM — B36.9 FUNGAL DERMATOSIS: Status: ACTIVE | Noted: 2018-01-09

## 2018-05-08 LAB
EST. AVERAGE GLUCOSE BLD GHB EST-MCNC: 154 MG/DL
HBA1C MFR BLD: 7 % (ref 4.2–6.3)

## 2018-05-08 PROCEDURE — 36415 COLL VENOUS BLD VENIPUNCTURE: CPT

## 2018-05-08 PROCEDURE — 83036 HEMOGLOBIN GLYCOSYLATED A1C: CPT

## 2018-05-08 PROCEDURE — 99214 OFFICE O/P EST MOD 30 MIN: CPT | Performed by: INTERNAL MEDICINE

## 2018-05-08 PROCEDURE — 3725F SCREEN DEPRESSION PERFORMED: CPT | Performed by: INTERNAL MEDICINE

## 2018-05-08 PROCEDURE — 1101F PT FALLS ASSESS-DOCD LE1/YR: CPT | Performed by: INTERNAL MEDICINE

## 2018-05-08 RX ORDER — LISINOPRIL AND HYDROCHLOROTHIAZIDE 20; 12.5 MG/1; MG/1
1 TABLET ORAL DAILY
COMMUNITY
End: 2018-11-21 | Stop reason: SDUPTHER

## 2018-05-08 RX ORDER — DIPHENHYDRAMINE HCL 25 MG
25 CAPSULE ORAL
COMMUNITY
End: 2021-01-05

## 2018-05-08 RX ORDER — TRIAMCINOLONE ACETONIDE 55 UG/1
SPRAY, METERED NASAL
COMMUNITY
Start: 2015-07-15

## 2018-05-08 RX ORDER — CHLORAL HYDRATE 500 MG
1000 CAPSULE ORAL DAILY
COMMUNITY
End: 2021-09-11

## 2018-05-08 RX ORDER — CLOTRIMAZOLE AND BETAMETHASONE DIPROPIONATE 10; .5 MG/ML; MG/ML
LOTION TOPICAL 2 TIMES DAILY
COMMUNITY
Start: 2012-01-11 | End: 2018-11-20

## 2018-05-08 NOTE — TELEPHONE ENCOUNTER
guillermo just called our office and stated that the newest diabetic medication that dr Jacinta Esparza sent cannot be covered and stated that [place] and [place] or trajenta probably would be covered, could you let him know please!

## 2018-05-08 NOTE — PROGRESS NOTES
Assessment/Plan:       Diagnoses and all orders for this visit:    Type 2 diabetes mellitus without complication, without long-term current use of insulin ([facility])  -     HEMOGLOBIN A1C W/ EAG ESTIMATION; Future  -     saxagliptin (ONGLYZA) 5 MG tablet; Take 1 tablet (5 mg total) by mouth daily for 180 days    Hypercholesterolemia    Essential hypertension    Other orders  -     aspirin 81 MG tablet; Take 81 mg by mouth once    -     clotrimazole-betamethasone (LOTRISONE) 1-0 05 % lotion; Apply topically Twice daily  -     Discontinue: sitaGLIPtin (JANUVIA) 100 mg tablet; Take 1 tablet by mouth daily  -     lisinopril-hydrochlorothiazide (PRINZIDE,ZESTORETIC) 20-12 5 MG per tablet; Take 1 tablet by mouth daily  -     Triamcinolone Acetonide (NASACORT ALLERGY 24HR) 55 MCG/ACT AERO; into each nostril  -     Discontinue: Omega-3-acid Ethyl Esters & D3 1 & 1000 GM & UNIT KIT; Take 2 capsules by mouth 2 (two) times a day  -     glucose blood (ONE TOUCH ULTRA TEST) test strip; 1 Squirt by In Vitro route daily  -     Omega-3 Fatty Acids (FISH OIL) 1,000 mg; Take 1,000 mg by mouth daily  -     diphenhydrAMINE (BENADRYL ALLERGY) 25 mg capsule; Take 25 mg by mouth daily at bedtime as needed for itching        No problem-specific Assessment & Plan notes found for this encounter  The patient will follow up in 4 months and we will see how he is doing with his DM with a HgbA1c, if possible, today  Subjective:      Patient ID: Dustin Adams is a 68 y o  male  The patient's cholesterol is doing well with diet control and his last LDL cholesterol in January was 85 mg/dL  The patient BP is good also and he notes no issues with the Prinizide  The patient states that there are no other issues noted no concerns  The patient would like to change the Januvia to Onglyza which is covered by the [place]  Hypertension   This is a chronic problem  The current episode started more than 1 year ago  The problem is unchanged   The problem is controlled  Pertinent negatives include no anxiety, blurred vision, chest pain, headaches, malaise/fatigue, neck pain, orthopnea, palpitations, peripheral edema, PND, shortness of breath or sweats  There are no associated agents to hypertension  Risk factors for coronary artery disease include male gender, obesity and dyslipidemia  Past treatments include ACE inhibitors and diuretics  The current treatment provides significant improvement  There are no compliance problems  There is no history of angina, kidney disease, CAD/MI, CVA, heart failure, PVD or retinopathy  Diabetes   He presents for his follow-up diabetic visit  He has type 2 diabetes mellitus  No MedicAlert identification noted  His disease course has been stable  There are no hypoglycemic associated symptoms  Pertinent negatives for hypoglycemia include no headaches or sweats  There are no diabetic associated symptoms  Pertinent negatives for diabetes include no blurred vision, no chest pain and no fatigue  Symptoms are stable  Diabetic complications include impotence  Pertinent negatives for diabetic complications include no CVA, nephropathy, peripheral neuropathy, PVD or retinopathy  Current diabetic treatment includes oral agent (monotherapy)  He is compliant with treatment all of the time  His weight is stable  He has not had a previous visit with a dietitian  There is no change in his home blood glucose trend  An ACE inhibitor/angiotensin II receptor blocker is being taken  He does not see a podiatrist Eye exam is not current  The following portions of the patient's history were reviewed and updated as appropriate:   He has no past medical history on file  ,   does not have any pertinent problems on file  ,   has a past surgical history that includes Wrist fusion (Bilateral) and Elbow surgery (Right)  ,  family history includes Diabetes type II in his mother; Heart disease in his mother; No Known Problems in his father; Rheumatic fever in his mother  ,   reports that he has quit smoking  He has never used smokeless tobacco  He reports that he drinks alcohol  He reports that he does not use drugs  ,  is allergic to pollen extract     Current Outpatient Prescriptions   Medication Sig Dispense Refill    aspirin 81 MG tablet Take 81 mg by mouth once        clotrimazole-betamethasone (LOTRISONE) 1-0 05 % lotion Apply topically Twice daily      diphenhydrAMINE (BENADRYL ALLERGY) 25 mg capsule Take 25 mg by mouth daily at bedtime as needed for itching      glucose blood (ONE TOUCH ULTRA TEST) test strip 1 Squirt by In Vitro route daily      lisinopril-hydrochlorothiazide (PRINZIDE,ZESTORETIC) 20-12 5 MG per tablet Take 1 tablet by mouth daily      Omega-3 Fatty Acids (FISH OIL) 1,000 mg Take 1,000 mg by mouth daily      Triamcinolone Acetonide (NASACORT ALLERGY 24HR) 55 MCG/ACT AERO into each nostril      saxagliptin (ONGLYZA) 5 MG tablet Take 1 tablet (5 mg total) by mouth daily for 180 days 90 tablet 1     No current facility-administered medications for this visit  Review of Systems   Constitutional: Negative for appetite change, fatigue, fever and malaise/fatigue  HENT: Negative for ear pain, sinus pain and sinus pressure  Eyes: Negative for blurred vision  Respiratory: Negative for choking and shortness of breath  Cardiovascular: Negative for chest pain, palpitations, orthopnea and PND  Gastrointestinal: Negative for abdominal pain, constipation, diarrhea, nausea and vomiting  Genitourinary: Positive for impotence  Musculoskeletal: Negative for arthralgias, myalgias and neck pain  Neurological: Negative for headaches  Hematological: Negative  Psychiatric/Behavioral: Negative            Objective:  Vitals:    05/08/18 0835   BP: 116/74   BP Location: Left arm   Patient Position: Sitting   Cuff Size: Large   Pulse: (!) 52   Resp: 16   Temp: 97 9 °F (36 6 °C)   TempSrc: Tympanic   Weight: 102 kg (225 lb)   Height: 5' 9" (1 753 m)     Body mass index is 33 23 kg/m²  Physical Exam   Constitutional: He is oriented to person, place, and time  He appears well-developed and well-nourished  HENT:   Head: Normocephalic and atraumatic  Eyes: Conjunctivae and EOM are normal  Pupils are equal, round, and reactive to light  Neck: Normal range of motion  Neck supple  Cardiovascular: Normal rate, regular rhythm, normal heart sounds and intact distal pulses  Pulmonary/Chest: Effort normal and breath sounds normal    Abdominal: Soft  Bowel sounds are normal    Musculoskeletal: Normal range of motion  Neurological: He is alert and oriented to person, place, and time  He has normal reflexes  Skin: Skin is warm and dry  Psychiatric: He has a normal mood and affect

## 2018-05-08 NOTE — TELEPHONE ENCOUNTER
This medication was specifically requested by the patient secondary to advice from his "[address]"  Inform him of the determination of [employer] and have him decide what he wants to do  Go back on Januvia or pay a pile of cash for the [employer] recommendation

## 2018-09-11 ENCOUNTER — TRANSCRIBE ORDERS (OUTPATIENT)
Dept: LAB | Facility: CLINIC | Age: 73
End: 2018-09-11

## 2018-09-11 ENCOUNTER — APPOINTMENT (OUTPATIENT)
Dept: LAB | Facility: CLINIC | Age: 73
End: 2018-09-11
Payer: COMMERCIAL

## 2018-09-11 ENCOUNTER — OFFICE VISIT (OUTPATIENT)
Dept: FAMILY MEDICINE CLINIC | Facility: CLINIC | Age: 73
End: 2018-09-11
Payer: COMMERCIAL

## 2018-09-11 VITALS
OXYGEN SATURATION: 92 % | BODY MASS INDEX: 33.18 KG/M2 | TEMPERATURE: 97.9 F | DIASTOLIC BLOOD PRESSURE: 70 MMHG | RESPIRATION RATE: 18 BRPM | HEART RATE: 60 BPM | WEIGHT: 224 LBS | SYSTOLIC BLOOD PRESSURE: 117 MMHG | HEIGHT: 69 IN

## 2018-09-11 DIAGNOSIS — E11.8 TYPE 2 DIABETES MELLITUS WITH COMPLICATION, WITHOUT LONG-TERM CURRENT USE OF INSULIN (HCC): Primary | ICD-10-CM

## 2018-09-11 DIAGNOSIS — I10 ESSENTIAL HYPERTENSION: ICD-10-CM

## 2018-09-11 DIAGNOSIS — E11.8 TYPE 2 DIABETES MELLITUS WITH COMPLICATION, WITHOUT LONG-TERM CURRENT USE OF INSULIN (HCC): ICD-10-CM

## 2018-09-11 PROBLEM — Z11.59 ENCOUNTER FOR HEPATITIS C SCREENING TEST FOR LOW RISK PATIENT: Status: ACTIVE | Noted: 2018-09-11

## 2018-09-11 LAB
EST. AVERAGE GLUCOSE BLD GHB EST-MCNC: 143 MG/DL
HBA1C MFR BLD: 6.6 % (ref 4.2–6.3)

## 2018-09-11 PROCEDURE — 83036 HEMOGLOBIN GLYCOSYLATED A1C: CPT

## 2018-09-11 PROCEDURE — 3074F SYST BP LT 130 MM HG: CPT | Performed by: INTERNAL MEDICINE

## 2018-09-11 PROCEDURE — 3078F DIAST BP <80 MM HG: CPT | Performed by: INTERNAL MEDICINE

## 2018-09-11 PROCEDURE — 3008F BODY MASS INDEX DOCD: CPT | Performed by: INTERNAL MEDICINE

## 2018-09-11 PROCEDURE — 1170F FXNL STATUS ASSESSED: CPT | Performed by: INTERNAL MEDICINE

## 2018-09-11 PROCEDURE — 1125F AMNT PAIN NOTED PAIN PRSNT: CPT | Performed by: INTERNAL MEDICINE

## 2018-09-11 PROCEDURE — 99213 OFFICE O/P EST LOW 20 MIN: CPT | Performed by: INTERNAL MEDICINE

## 2018-09-11 PROCEDURE — 86803 HEPATITIS C AB TEST: CPT

## 2018-09-11 PROCEDURE — 36415 COLL VENOUS BLD VENIPUNCTURE: CPT

## 2018-09-11 PROCEDURE — G0439 PPPS, SUBSEQ VISIT: HCPCS | Performed by: INTERNAL MEDICINE

## 2018-09-11 RX ORDER — GLIMEPIRIDE 2 MG/1
2 TABLET ORAL
Qty: 90 TABLET | Refills: 1 | Status: SHIPPED | OUTPATIENT
Start: 2018-09-11 | End: 2018-11-20 | Stop reason: SDUPTHER

## 2018-09-11 NOTE — PROGRESS NOTES
Assessment/Plan:       Diagnoses and all orders for this visit:    Type 2 diabetes mellitus with complication, without long-term current use of insulin ([facility])  -     HEMOGLOBIN A1C W/ EAG ESTIMATION; Future  -     Hepatitis C antibody; Future  -     glimepiride (AMARYL) 2 mg tablet; Take 1 tablet (2 mg total) by mouth daily with breakfast for 180 days    Other orders  -     Discontinue: sitaGLIPtin (JANUVIA) 100 mg tablet; Take 100 mg by mouth daily        No problem-specific Assessment & Plan notes found for this encounter  We will follow up in the next several weeks regarding the change to Glimepiride  Subjective:      Patient ID: Kira Rouse is a 68 y o  male  The patient states the Januvia is to expensive  He notes intolerance of the Metformin and we will try Glimepiride in its place  His HgbA1c is noted to be good  Diabetes   He presents for his follow-up diabetic visit  He has type 2 diabetes mellitus  His disease course has been stable  There are no hypoglycemic associated symptoms  There are no diabetic associated symptoms  Diabetic complications include peripheral neuropathy  Risk factors for coronary artery disease include hypertension, male sex and diabetes mellitus  Current diabetic treatment includes oral agent (monotherapy)  He is compliant with treatment all of the time  His weight is stable  When asked about meal planning, he reported none  He rarely participates in exercise  There is no change in his home blood glucose trend  An ACE inhibitor/angiotensin II receptor blocker is being taken  He sees a podiatrist Eye exam is current  The following portions of the patient's history were reviewed and updated as appropriate:   He has a past medical history of Arthritis of knee, right; Diabetes mellitus ([address] ); History of gastroesophageal reflux (GERD); Hypercholesterolemia; Morbid (severe) obesity due to excess calories ([address] );  Papular rash; Pes anserine bursitis; Sebaceous cyst; Testicular hypogonadism; and Tick bite ,   does not have any pertinent problems on file  ,   has a past surgical history that includes Wrist fusion (Bilateral) and Elbow surgery (Right)  ,  family history includes Diabetes type II in his mother; Heart disease in his mother; No Known Problems in his father; Rheumatic fever in his mother  ,   reports that he has quit smoking  He has never used smokeless tobacco  He reports that he drinks alcohol  He reports that he does not use drugs  ,  is allergic to pollen extract     Current Outpatient Prescriptions   Medication Sig Dispense Refill    aspirin 81 MG tablet Take 81 mg by mouth once        clotrimazole-betamethasone (LOTRISONE) 1-0 05 % lotion Apply topically Twice daily      diphenhydrAMINE (BENADRYL ALLERGY) 25 mg capsule Take 25 mg by mouth daily at bedtime as needed for itching      glucose blood (ONE TOUCH ULTRA TEST) test strip 1 Squirt by In Vitro route daily      lisinopril-hydrochlorothiazide (PRINZIDE,ZESTORETIC) 20-12 5 MG per tablet Take 1 tablet by mouth daily      Omega-3 Fatty Acids (FISH OIL) 1,000 mg Take 1,000 mg by mouth daily      Triamcinolone Acetonide (NASACORT ALLERGY 24HR) 55 MCG/ACT AERO into each nostril      glimepiride (AMARYL) 2 mg tablet Take 1 tablet (2 mg total) by mouth daily with breakfast for 180 days 90 tablet 1     No current facility-administered medications for this visit  Review of Systems   Constitutional: Negative  HENT: Negative  Eyes: Negative  Respiratory: Negative  Cardiovascular: Negative  Gastrointestinal: Negative  Musculoskeletal: Negative  Neurological: Negative  Psychiatric/Behavioral: Negative            Objective:  Vitals:    09/11/18 0825   BP: 117/70   BP Location: Left arm   Patient Position: Sitting   Cuff Size: Large   Pulse: 60   Resp: 18   Temp: 97 9 °F (36 6 °C)   TempSrc: Tympanic   SpO2: 92%   Weight: 102 kg (224 lb)   Height: 5' 9" (1 753 m)     Body mass index is 33 08 kg/m²  Physical Exam   Constitutional: He is oriented to person, place, and time  He appears well-developed and well-nourished  HENT:   Head: Normocephalic and atraumatic  Eyes: EOM are normal  Pupils are equal, round, and reactive to light  Neck: Normal range of motion  Neck supple  Cardiovascular: Normal rate, regular rhythm, normal heart sounds and intact distal pulses  Exam reveals no gallop  No murmur heard  Pulmonary/Chest: Effort normal and breath sounds normal  No respiratory distress  He has no wheezes  He has no rales  Abdominal: Soft  Bowel sounds are normal  He exhibits no distension  There is no tenderness  There is no rebound  Musculoskeletal: Normal range of motion  He exhibits no edema or tenderness  Neurological: He is alert and oriented to person, place, and time  No cranial nerve deficit  Coordination normal    Skin: Skin is warm and dry  Psychiatric: He has a normal mood and affect  His behavior is normal    Nursing note and vitals reviewed

## 2018-09-11 NOTE — PROGRESS NOTES
Assessment and Plan:    Problem List Items Addressed This Visit     None      Visit Diagnoses     Type 2 diabetes mellitus with complication, without long-term current use of insulin ([address] )    -  Primary    Relevant Medications    sitaGLIPtin (JANUVIA) 100 mg tablet        Health Maintenance Due   Topic Date Due    SLP PLAN OF CARE  1945    CRC Screening: FOBTx3/FIT  03/10/1996    Pneumococcal PPSV23/PCV13 65+ Years / Low and Medium Risk (2 of 2 - PCV13) 01/15/2014    Diabetic Foot Exam  02/07/2018    HEMOGLOBIN A1C  08/08/2018    INFLUENZA VACCINE  09/01/2018         HPI:  Carlos Wong is a 68 y o  male here for his Subsequent Wellness Visit      Patient Active Problem List   Diagnosis    Type 2 diabetes mellitus without complication, without long-term current use of insulin ([address] )    Essential hypertension    Allergic rhinitis    Arthritis of knee, right    Enlarged prostate with lower urinary tract symptoms (LUTS)    Gastroesophageal reflux disease without esophagitis    Fungal dermatosis    Hypercholesterolemia    Morbid obesity ([address] )    Peyronie's disease     Past Medical History:   Diagnosis Date    Arthritis of knee, right     LAST ASSESSED: 2/7/17    Diabetes mellitus ([address] )     History of gastroesophageal reflux (GERD)     LAST ASSESSED: 5/9/17    Hypercholesterolemia     LAST ASSESSED: AND RESOLVED: 1/9/18    Morbid (severe) obesity due to excess calories ([address] )     LAST ASSESSED: 5/10/16    Papular rash     RESOLVED: 2/1/16    Pes anserine bursitis     LAST ASSESSED: 11/29/16    Sebaceous cyst     LAST ASSESSED: 10/21/14    Testicular hypogonadism     LAST ASSESSED: 10/30/13    Tick bite     LAST ASSESSED: 11/14/16     Past Surgical History:   Procedure Laterality Date    ELBOW SURGERY Right     WRIST FUSION Bilateral      Family History   Problem Relation Age of Onset    Diabetes type II Mother         MELLITUS    Rheumatic fever Mother     Heart disease Mother     No Known Problems Father      History   Smoking Status    Former Smoker   Smokeless Tobacco    Never Used     History   Alcohol Use    Yes     Comment: [place]       History   Drug Use No       Current Outpatient Prescriptions   Medication Sig Dispense Refill    aspirin 81 MG tablet Take 81 mg by mouth once        clotrimazole-betamethasone (LOTRISONE) 1-0 05 % lotion Apply topically Twice daily      diphenhydrAMINE (BENADRYL ALLERGY) 25 mg capsule Take 25 mg by mouth daily at bedtime as needed for itching      glucose blood (ONE TOUCH ULTRA TEST) test strip 1 Squirt by In Vitro route daily      lisinopril-hydrochlorothiazide (PRINZIDE,ZESTORETIC) 20-12 5 MG per tablet Take 1 tablet by mouth daily      Omega-3 Fatty Acids (FISH OIL) 1,000 mg Take 1,000 mg by mouth daily      sitaGLIPtin (JANUVIA) 100 mg tablet Take 100 mg by mouth daily      Triamcinolone Acetonide (NASACORT ALLERGY 24HR) 55 MCG/ACT AERO into each nostril       No current facility-administered medications for this visit  Allergies   Allergen Reactions    Pollen Extract Allergic Rhinitis     Immunization History   Administered Date(s) Administered     Influenza (IM) Preservative Free 10/01/2014    Influenza 1945, 10/01/2014, 10/01/2017    Influenza Split High Dose Preservative Free IM 10/03/2015    Influenza TIV (IM) 10/01/2017    Pneumococcal Polysaccharide PPV23 01/15/2013    Tdap 01/21/2015    Zoster 01/15/2013       Patient Care Team:  Roxanne Reynolds DO as PCP - General    Medicare Screening Tests and Risk Assessments:  Warren Oshea is here for his Subsequent Wellness visit  Last Medicare Wellness visit information reviewed, patient interviewed, no change since last AWV  Health Risk Assessment:  Patient rates overall health as very good  Patient feels that their physical health rating is Same  Eyesight was rated as Same  Hearing was rated as Same  Patient feels that their emotional and mental health rating is Same   Pain experienced by patient in the last 7 days has been None  Patient states that he has experienced no weight loss or gain in last 6 months  Emotional/Mental Health:  Patient has been feeling nervous/anxious  PHQ-9 Depression Screening:    Frequency of the following problems over the past two weeks:      1  Little interest or pleasure in doing things: 0 - not at all      2  Feeling down, depressed, or hopeless: 0 - not at all  PHQ-2 Score: 0          Broken Bones/Falls: Fall Risk Assessment:    In the past year, patient has experienced: No history of falling in past year          Bladder/Bowel:  Patient has not leaked urine accidently in the last six months  Patient reports no loss of bowel control  Immunizations:  Patient has had a flu vaccination within the last year  Patient has received a pneumonia shot  Patient has received a shingles shot  Patient has received tetanus/diphtheria shot  Date of tetanus/diphtheria shot: 1/21/2015    Home Safety:  Patient does not have trouble with stairs inside or outside of their home  Patient currently reports that there are no safety hazards present in home, working smoke alarms, working carbon monoxide detectors  Preventative Screenings:   prostate cancer screen performed, colon cancer screen completed, cholesterol screen completed, glaucoma eye exam completed,     Nutrition:  Current diet: Unhealthy and Limited junk food with servings of the following:    Medications:  Patient is currently taking over-the-counter supplements  Patient is able to manage medications  Lifestyle Choices:  Patient reports no tobacco use  Patient has smoked or used tobacco in the past   Patient has stopped his tobacco use  Tobacco use quit date: 1986  Patient reports no alcohol use  Patient drives a vehicle  Patient wears seat belt          Activities of Daily Living:  Can get out of bed by his or her self, able to dress self, able to make own meals, able to do own shopping, able to bathe self, can do own laundry/housekeeping, can manage own money, pay bills and track expenses    Previous Hospitalizations:  No hospitalization or ED visit in past 12 months        Advanced Directives:  Patient has decided on a power of   Patient has spoken to designated power of   Patient has completed advanced directive  Preventative Screening/Counseling:      Cardiovascular:      General: Screening Current          Diabetes:      General: Screening Not Indicated          Colorectal Cancer:      General: Screening Current          Prostate Cancer:      General: Screening Current          Osteoporosis:      General: Screening Not Indicated          AAA:      General: Screening Not Indicated          Glaucoma:      General: Screening Current          HIV:      General: Screening Not Indicated          Hepatitis C:      General: Risks and Benefits Discussed        Advanced Directives:   Patient has living will for healthcare, has durable POA for healthcare, patient has an advanced directive  Information on ACP and/or AD not provided  No 5 wishes given  No end of life assessment reviewed with patient  Provider does not agree with end of life deisions       Immunizations:      Influenza: Influenza UTD This Year      Pneumococcal: Lifetime Vaccine Completed      Hepatitis B (Low risk patients): Series Not Indicated      Zostavax: Zostavax Vaccine UTD      TD: Td Vaccine UTD

## 2018-09-12 LAB — HCV AB SER QL: NORMAL

## 2018-09-18 ENCOUNTER — TELEPHONE (OUTPATIENT)
Dept: FAMILY MEDICINE CLINIC | Facility: CLINIC | Age: 73
End: 2018-09-18

## 2018-09-18 NOTE — TELEPHONE ENCOUNTER
Assessment/Plan:       Diagnoses and all orders for this visit:    Type 2 diabetes mellitus with complication, without long-term current use of insulin (HCC)  -     HEMOGLOBIN A1C W/ EAG ESTIMATION; Future  -     Hepatitis C antibody; Future  -     glimepiride (AMARYL) 2 mg tablet; Take 1 tablet (2 mg total) by mouth daily with breakfast for 180 days    Other orders  -     Discontinue: sitaGLIPtin (JANUVIA) 100 mg tablet; Take 100 mg by mouth daily        No problem-specific Assessment & Plan notes found for this encounter  We will follow up in the next several weeks regarding the change to Glimepiride  Subjective:      Patient ID: Darrian Randall is a 68 y o  male  The patient states the Januvia is to expensive  He notes intolerance of the Metformin and we will try Glimepiride in its place  His HgbA1c is noted to be good  Diabetes   He presents for his follow-up diabetic visit  He has type 2 diabetes mellitus  His disease course has been stable  There are no hypoglycemic associated symptoms  There are no diabetic associated symptoms  Diabetic complications include peripheral neuropathy  Risk factors for coronary artery disease include hypertension, male sex and diabetes mellitus  Current diabetic treatment includes oral agent (monotherapy)  He is compliant with treatment all of the time  His weight is stable  When asked about meal planning, he reported none  He rarely participates in exercise  There is no change in his home blood glucose trend  An ACE inhibitor/angiotensin II receptor blocker is being taken  He sees a podiatrist Eye exam is current  The following portions of the patient's history were reviewed and updated as appropriate:   He has a past medical history of Arthritis of knee, right; Diabetes mellitus ([address] ); History of gastroesophageal reflux (GERD); Hypercholesterolemia; Morbid (severe) obesity due to excess calories ([address] );  Papular rash; Pes anserine bursitis; Sebaceous cyst; Testicular hypogonadism; and Tick bite ,   does not have any pertinent problems on file  ,   has a past surgical history that includes Wrist fusion (Bilateral) and Elbow surgery (Right)  ,  family history includes Diabetes type II in his mother; Heart disease in his mother; No Known Problems in his father; Rheumatic fever in his mother  ,   reports that he has quit smoking  He has never used smokeless tobacco  He reports that he drinks alcohol  He reports that he does not use drugs  ,  is allergic to pollen extract     Current Outpatient Prescriptions   Medication Sig Dispense Refill    aspirin 81 MG tablet Take 81 mg by mouth once        clotrimazole-betamethasone (LOTRISONE) 1-0 05 % lotion Apply topically Twice daily      diphenhydrAMINE (BENADRYL ALLERGY) 25 mg capsule Take 25 mg by mouth daily at bedtime as needed for itching      glimepiride (AMARYL) 2 mg tablet Take 1 tablet (2 mg total) by mouth daily with breakfast for 180 days 90 tablet 1    glucose blood (ONE TOUCH ULTRA TEST) test strip 1 Squirt by In Vitro route daily      lisinopril-hydrochlorothiazide (PRINZIDE,ZESTORETIC) 20-12 5 MG per tablet Take 1 tablet by mouth daily      Omega-3 Fatty Acids (FISH OIL) 1,000 mg Take 1,000 mg by mouth daily      Triamcinolone Acetonide (NASACORT ALLERGY 24HR) 55 MCG/ACT AERO into each nostril       No current facility-administered medications for this visit  Review of Systems   Constitutional: Negative  HENT: Negative  Eyes: Negative  Respiratory: Negative  Cardiovascular: Negative  Gastrointestinal: Negative  Musculoskeletal: Negative  Neurological: Negative  Psychiatric/Behavioral: Negative            Objective:  Vitals:    09/11/18 0825   BP: 117/70   BP Location: Left arm   Patient Position: Sitting   Cuff Size: Large   Pulse: 60   Resp: 18   Temp: 97 9 °F (36 6 °C)   TempSrc: Tympanic   SpO2: 92%   Weight: 102 kg (224 lb)   Height: 5' 9" (1 753 m)     There is no height or weight on file to calculate BMI  Physical Exam   Constitutional: He is oriented to person, place, and time  He appears well-developed and well-nourished  HENT:   Head: Normocephalic and atraumatic  Eyes: EOM are normal  Pupils are equal, round, and reactive to light  Neck: Normal range of motion  Neck supple  Cardiovascular: Normal rate, regular rhythm, normal heart sounds and intact distal pulses  Exam reveals no gallop  No murmur heard  Pulmonary/Chest: Effort normal and breath sounds normal  No respiratory distress  He has no wheezes  He has no rales  Abdominal: Soft  Bowel sounds are normal  He exhibits no distension  There is no tenderness  There is no rebound  Musculoskeletal: Normal range of motion  He exhibits no edema or tenderness  Neurological: He is alert and oriented to person, place, and time  No cranial nerve deficit  Coordination normal    Skin: Skin is warm and dry  Psychiatric: He has a normal mood and affect  His behavior is normal    Nursing note and vitals reviewed  Assessment and Plan:    Problem List Items Addressed This Visit     None        Health Maintenance Due   Topic Date Due    SLP PLAN OF CARE  1945    CRC Screening: FOBTx3/FIT  03/10/1996    Pneumococcal PPSV23/PCV13 65+ Years / Low and Medium Risk (2 of 2 - PCV13) 01/15/2014    INFLUENZA VACCINE  09/01/2018         HPI:  Dante Zepeda is a 68 y o  male here for his Subsequent Wellness Visit      Patient Active Problem List   Diagnosis    Type 2 diabetes mellitus without complication, without long-term current use of insulin ([address] )    Essential hypertension    Allergic rhinitis    Arthritis of knee, right    Enlarged prostate with lower urinary tract symptoms (LUTS)    Gastroesophageal reflux disease without esophagitis    Fungal dermatosis    Hypercholesterolemia    Morbid obesity ([address] )    Peyronie's disease    Encounter for hepatitis C screening test for low risk patient     Past Medical History:   Diagnosis Date    Arthritis of knee, right     LAST ASSESSED: 2/7/17    Diabetes mellitus ([facility] )     History of gastroesophageal reflux (GERD)     LAST ASSESSED: 5/9/17    Hypercholesterolemia     LAST ASSESSED: AND RESOLVED: 1/9/18    Morbid (severe) obesity due to excess calories (HCC)     LAST ASSESSED: 5/10/16    Papular rash     RESOLVED: 2/1/16    Pes anserine bursitis     LAST ASSESSED: 11/29/16    Sebaceous cyst     LAST ASSESSED: 10/21/14    Testicular hypogonadism     LAST ASSESSED: 10/30/13    Tick bite     LAST ASSESSED: 11/14/16     Past Surgical History:   Procedure Laterality Date    ELBOW SURGERY Right     WRIST FUSION Bilateral      Family History   Problem Relation Age of Onset    Diabetes type II Mother         MELLITUS    Rheumatic fever Mother     Heart disease Mother     No Known Problems Father      History   Smoking Status    Former Smoker   Smokeless Tobacco    Never Used     History   Alcohol Use    Yes     Comment: Sravanthi       History   Drug Use No       Current Outpatient Prescriptions   Medication Sig Dispense Refill    aspirin 81 MG tablet Take 81 mg by mouth once        clotrimazole-betamethasone (LOTRISONE) 1-0 05 % lotion Apply topically Twice daily      diphenhydrAMINE (BENADRYL ALLERGY) 25 mg capsule Take 25 mg by mouth daily at bedtime as needed for itching      glimepiride (AMARYL) 2 mg tablet Take 1 tablet (2 mg total) by mouth daily with breakfast for 180 days 90 tablet 1    glucose blood (ONE TOUCH ULTRA TEST) test strip 1 Squirt by In Vitro route daily      lisinopril-hydrochlorothiazide (PRINZIDE,ZESTORETIC) 20-12 5 MG per tablet Take 1 tablet by mouth daily      Omega-3 Fatty Acids (FISH OIL) 1,000 mg Take 1,000 mg by mouth daily      Triamcinolone Acetonide (NASACORT ALLERGY 24HR) 55 MCG/ACT AERO into each nostril       No current facility-administered medications for this visit        Allergies   Allergen Reactions    Pollen Extract Allergic Rhinitis     Immunization History   Administered Date(s) Administered     Influenza (IM) Preservative Free 10/01/2014    Influenza 1945, 10/01/2014, 10/01/2017    Influenza Split High Dose Preservative Free IM 10/03/2015    Influenza TIV (IM) 10/01/2017    Pneumococcal Polysaccharide PPV23 01/15/2013    Tdap 01/21/2015    Zoster 01/15/2013       Patient Care Team:  Chas Hidalgo DO as PCP - General    Medicare Screening Tests and Risk Assessments:  Lauren Goldman is here for his Subsequent Wellness visit  Last Medicare Wellness visit information reviewed, patient interviewed, no change since last AWV  Health Risk Assessment:  Patient rates overall health as very good  Patient feels that their physical health rating is Same  Eyesight was rated as Same  Hearing was rated as Same  Patient feels that their emotional and mental health rating is Same  Pain experienced by patient in the last 7 days has been None  Patient states that he has experienced no weight loss or gain in last 6 months  Emotional/Mental Health:  Patient has been feeling nervous/anxious  PHQ-9 Depression Screening:    Frequency of the following problems over the past two weeks:      1  Little interest or pleasure in doing things: 0 - not at all      2  Feeling down, depressed, or hopeless: 0 - not at all  PHQ-2 Score: 0          Broken Bones/Falls: Fall Risk Assessment:    In the past year, patient has experienced: No history of falling in past year          Bladder/Bowel:  Patient has not leaked urine accidently in the last six months  Patient reports no loss of bowel control  Immunizations:  Patient has had a flu vaccination within the last year  Patient has received a pneumonia shot  Patient has received a shingles shot  Patient has received tetanus/diphtheria shot  Date of tetanus/diphtheria shot: 1/21/2015    Home Safety:  Patient does not have trouble with stairs inside or outside of their home  Patient currently reports that there are no safety hazards present in home, working smoke alarms, working carbon monoxide detectors  Preventative Screenings:   prostate cancer screen performed, colon cancer screen completed, cholesterol screen completed, glaucoma eye exam completed,     Nutrition:  Current diet: Unhealthy and Limited junk food with servings of the following:    Medications:  Patient is currently taking over-the-counter supplements  Patient is able to manage medications  Lifestyle Choices:  Patient reports no tobacco use  Patient has smoked or used tobacco in the past   Patient has stopped his tobacco use  Tobacco use quit date: 1986  Patient reports no alcohol use  Patient drives a vehicle  Patient wears seat belt  Activities of Daily Living:  Can get out of bed by his or her self, able to dress self, able to make own meals, able to do own shopping, able to bathe self, can do own laundry/housekeeping, can manage own money, pay bills and track expenses    Previous Hospitalizations:  No hospitalization or ED visit in past 12 months        Advanced Directives:  Patient has decided on a power of   Patient has spoken to designated power of   Patient has completed advanced directive  Preventative Screening/Counseling:      Cardiovascular:      General: Screening Current          Diabetes:      General: Screening Not Indicated          Colorectal Cancer:      General: Screening Current          Prostate Cancer:      General: Screening Current          Osteoporosis:      General: Screening Not Indicated          AAA:      General: Screening Not Indicated          Glaucoma:      General: Screening Current          HIV:      General: Screening Not Indicated          Hepatitis C:      General: Risks and Benefits Discussed        Advanced Directives:   Patient has living will for healthcare, has durable POA for healthcare, patient has an advanced directive  Information on ACP and/or AD not provided  No 5 wishes given  No end of life assessment reviewed with patient  Provider does not agree with end of life deisions       Immunizations:      Influenza: Influenza UTD This Year      Pneumococcal: Lifetime Vaccine Completed      Hepatitis B (Low risk patients): Series Not Indicated      Zostavax: Zostavax Vaccine UTD      TD: Td Vaccine UTD

## 2018-11-20 ENCOUNTER — OFFICE VISIT (OUTPATIENT)
Dept: FAMILY MEDICINE CLINIC | Facility: CLINIC | Age: 73
End: 2018-11-20
Payer: COMMERCIAL

## 2018-11-20 VITALS
HEART RATE: 60 BPM | BODY MASS INDEX: 34.33 KG/M2 | SYSTOLIC BLOOD PRESSURE: 120 MMHG | WEIGHT: 231.8 LBS | RESPIRATION RATE: 16 BRPM | DIASTOLIC BLOOD PRESSURE: 78 MMHG | TEMPERATURE: 97.9 F | HEIGHT: 69 IN

## 2018-11-20 DIAGNOSIS — E11.8 TYPE 2 DIABETES MELLITUS WITH COMPLICATION, WITHOUT LONG-TERM CURRENT USE OF INSULIN (HCC): ICD-10-CM

## 2018-11-20 DIAGNOSIS — Z23 NEED FOR INFLUENZA VACCINATION: ICD-10-CM

## 2018-11-20 DIAGNOSIS — K21.9 GASTROESOPHAGEAL REFLUX DISEASE WITHOUT ESOPHAGITIS: ICD-10-CM

## 2018-11-20 DIAGNOSIS — Z13.29 SCREENING FOR HYPOTHYROIDISM: ICD-10-CM

## 2018-11-20 DIAGNOSIS — I10 ESSENTIAL HYPERTENSION: ICD-10-CM

## 2018-11-20 DIAGNOSIS — E11.9 TYPE 2 DIABETES MELLITUS WITHOUT COMPLICATION, UNSPECIFIED WHETHER LONG TERM INSULIN USE (HCC): Primary | ICD-10-CM

## 2018-11-20 DIAGNOSIS — E78.00 HYPERCHOLESTEREMIA: ICD-10-CM

## 2018-11-20 DIAGNOSIS — N40.1 BENIGN PROSTATIC HYPERPLASIA WITH LOWER URINARY TRACT SYMPTOMS, SYMPTOM DETAILS UNSPECIFIED: ICD-10-CM

## 2018-11-20 PROCEDURE — 1036F TOBACCO NON-USER: CPT | Performed by: INTERNAL MEDICINE

## 2018-11-20 PROCEDURE — 3074F SYST BP LT 130 MM HG: CPT | Performed by: INTERNAL MEDICINE

## 2018-11-20 PROCEDURE — 99214 OFFICE O/P EST MOD 30 MIN: CPT | Performed by: INTERNAL MEDICINE

## 2018-11-20 PROCEDURE — 3008F BODY MASS INDEX DOCD: CPT | Performed by: INTERNAL MEDICINE

## 2018-11-20 PROCEDURE — 3078F DIAST BP <80 MM HG: CPT | Performed by: INTERNAL MEDICINE

## 2018-11-20 PROCEDURE — 1160F RVW MEDS BY RX/DR IN RCRD: CPT | Performed by: INTERNAL MEDICINE

## 2018-11-20 PROCEDURE — 4040F PNEUMOC VAC/ADMIN/RCVD: CPT | Performed by: INTERNAL MEDICINE

## 2018-11-20 RX ORDER — GLIMEPIRIDE 2 MG/1
2 TABLET ORAL
Qty: 90 TABLET | Refills: 1 | Status: SHIPPED | OUTPATIENT
Start: 2018-11-20 | End: 2019-03-26

## 2018-11-20 NOTE — PROGRESS NOTES
Assessment/Plan:       Diagnoses and all orders for this visit:    Type 2 diabetes mellitus without complication, unspecified whether long term insulin use (HCC)  -     CBC and differential; Future  -     Comprehensive metabolic panel; Future    Essential hypertension    Hypercholesteremia  -     Lipid Panel with Direct LDL reflex; Future    Gastroesophageal reflux disease without esophagitis    Screening for hypothyroidism    Benign prostatic hyperplasia with lower urinary tract symptoms, symptom details unspecified  -     PSA, Total Screen; Future    Need for influenza vaccination    Type 2 diabetes mellitus with complication, without long-term current use of insulin (HCC)  -     glimepiride (AMARYL) 2 mg tablet; Take 1 tablet (2 mg total) by mouth daily with breakfast for 180 days    Other orders  -     Cancel: TSH, 3rd generation; Future  -     Cancel: PSA, total and free; Future  -     Cancel: influenza vaccine, [id number], high-dose, PF 0 5 mL, for patients 65 yr+ (FLUZONE HIGH-DOSE)        No problem-specific Assessment & Plan notes found for this encounter  We will followup in the next 4 months and see how things go  Subjective:      Patient ID: Sharmin English is a 68 y o  male  The patient was seen and examined and noted to have issues with DM  He noted myalgia with the metformin and he is doing well since it was discharged  The patient states that he has no concerns regarding his glucose control  He notes that his fasting has averaged 115 mg/dL  The patient's BP is good and he notes no issues with HTN  He statse no orthostatic symptoms  His GERD is also well controlled without medications  Hypertension   This is a chronic problem  The current episode started more than 1 year ago  The problem is unchanged  The problem is controlled   Pertinent negatives include no anxiety, blurred vision, chest pain, headaches, malaise/fatigue, neck pain, orthopnea, palpitations, peripheral edema, PND, shortness of breath or sweats  There are no associated agents to hypertension  Risk factors for coronary artery disease include male gender and diabetes mellitus  Past treatments include diuretics and ACE inhibitors  The current treatment provides significant improvement  There are no compliance problems  There is no history of angina, kidney disease, CAD/MI, CVA, heart failure, left ventricular hypertrophy, PVD or retinopathy  Diabetes   He presents for his follow-up diabetic visit  He has type 2 diabetes mellitus  His disease course has been stable  There are no hypoglycemic associated symptoms  Pertinent negatives for hypoglycemia include no headaches or sweats  Pertinent negatives for diabetes include no blurred vision, no chest pain and no fatigue  Pertinent negatives for diabetic complications include no autonomic neuropathy, CVA, heart disease, impotence, nephropathy, peripheral neuropathy, PVD or retinopathy  Current diabetic treatment includes oral agent (monotherapy)  He is compliant with treatment all of the time  His weight is stable  His home blood glucose trend is decreasing steadily  An ACE inhibitor/angiotensin II receptor blocker is being taken  The following portions of the patient's history were reviewed and updated as appropriate:   He has a past medical history of Arthritis of knee, right; Diabetes mellitus ([address] ); History of gastroesophageal reflux (GERD); Hypercholesterolemia; Morbid (severe) obesity due to excess calories ([address] ); Papular rash; Pes anserine bursitis; Sebaceous cyst; Testicular hypogonadism; and Tick bite ,   does not have any pertinent problems on file  ,   has a past surgical history that includes Wrist fusion (Bilateral) and Elbow surgery (Right)  ,  family history includes Diabetes type II in his mother; Heart disease in his mother; No Known Problems in his father; Rheumatic fever in his mother  ,   reports that he has quit smoking   He has never used smokeless tobacco  He reports that he drinks alcohol  He reports that he does not use drugs  ,  is allergic to pollen extract     Current Outpatient Prescriptions   Medication Sig Dispense Refill    aspirin 81 MG tablet Take 81 mg by mouth once        diphenhydrAMINE (BENADRYL ALLERGY) 25 mg capsule Take 25 mg by mouth daily at bedtime as needed for itching      glimepiride (AMARYL) 2 mg tablet Take 1 tablet (2 mg total) by mouth daily with breakfast for 180 days 90 tablet 1    glucose blood (ONE TOUCH ULTRA TEST) test strip 1 Squirt by In Vitro route daily      lisinopril-hydrochlorothiazide (PRINZIDE,ZESTORETIC) 20-12 5 MG per tablet Take 1 tablet by mouth daily      Omega-3 Fatty Acids (FISH OIL) 1,000 mg Take 1,000 mg by mouth daily      Triamcinolone Acetonide (NASACORT ALLERGY 24HR) 55 MCG/ACT AERO into each nostril       No current facility-administered medications for this visit  Review of Systems   Constitutional: Negative for chills, fatigue, fever and malaise/fatigue  HENT: Negative  Eyes: Negative for blurred vision  Respiratory: Negative for cough, chest tightness and shortness of breath  Cardiovascular: Negative for chest pain, palpitations, orthopnea, leg swelling and PND  Gastrointestinal: Negative for abdominal pain, constipation, diarrhea, nausea and vomiting  Genitourinary: Negative for impotence  Musculoskeletal: Negative for arthralgias, joint swelling, myalgias and neck pain  Neurological: Negative for headaches  Psychiatric/Behavioral: Negative  Objective:  Vitals:    11/20/18 0922   BP: 120/78   BP Location: Left arm   Patient Position: Sitting   Cuff Size: Large   Pulse: 60   Resp: 16   Temp: 97 9 °F (36 6 °C)   TempSrc: Tympanic   Weight: 105 kg (231 lb 12 8 oz)   Height: 5' 9" (1 753 m)     Body mass index is 34 23 kg/m²  Physical Exam   Constitutional: He is oriented to person, place, and time  He appears well-nourished     HENT:   Head: Normocephalic and atraumatic  Eyes: Pupils are equal, round, and reactive to light  Conjunctivae and EOM are normal    Neck: Normal range of motion  Neck supple  Cardiovascular: Normal rate, regular rhythm, normal heart sounds and intact distal pulses  Exam reveals no gallop  No murmur heard  Pulmonary/Chest: Effort normal and breath sounds normal  No respiratory distress  He has no wheezes  He has no rales  Abdominal: Soft  Bowel sounds are normal  He exhibits no distension  There is no tenderness  Musculoskeletal: Normal range of motion  He exhibits no edema or tenderness  Neurological: He is alert and oriented to person, place, and time  Skin: Skin is warm and dry  Psychiatric: He has a normal mood and affect  Nursing note and vitals reviewed

## 2018-11-21 DIAGNOSIS — I10 ESSENTIAL HYPERTENSION: Primary | ICD-10-CM

## 2018-11-25 RX ORDER — LISINOPRIL AND HYDROCHLOROTHIAZIDE 20; 12.5 MG/1; MG/1
1 TABLET ORAL DAILY
Qty: 90 TABLET | Refills: 1 | Status: SHIPPED | OUTPATIENT
Start: 2018-11-25 | End: 2019-05-05 | Stop reason: SDUPTHER

## 2019-03-18 ENCOUNTER — APPOINTMENT (OUTPATIENT)
Dept: LAB | Facility: CLINIC | Age: 74
End: 2019-03-18
Payer: COMMERCIAL

## 2019-03-18 ENCOUNTER — TRANSCRIBE ORDERS (OUTPATIENT)
Dept: LAB | Facility: CLINIC | Age: 74
End: 2019-03-18

## 2019-03-18 DIAGNOSIS — N40.1 BENIGN PROSTATIC HYPERPLASIA WITH LOWER URINARY TRACT SYMPTOMS, SYMPTOM DETAILS UNSPECIFIED: ICD-10-CM

## 2019-03-18 DIAGNOSIS — E78.00 HYPERCHOLESTEREMIA: ICD-10-CM

## 2019-03-18 DIAGNOSIS — E11.9 TYPE 2 DIABETES MELLITUS WITHOUT COMPLICATION, UNSPECIFIED WHETHER LONG TERM INSULIN USE (HCC): ICD-10-CM

## 2019-03-18 LAB
ALBUMIN SERPL BCP-MCNC: 4 G/DL (ref 3.5–5)
ALP SERPL-CCNC: 78 U/L (ref 46–116)
ALT SERPL W P-5'-P-CCNC: 56 U/L (ref 12–78)
ANION GAP SERPL CALCULATED.3IONS-SCNC: 2 MMOL/L (ref 4–13)
AST SERPL W P-5'-P-CCNC: 31 U/L (ref 5–45)
BASOPHILS # BLD AUTO: 0.05 THOUSANDS/ΜL (ref 0–0.1)
BASOPHILS NFR BLD AUTO: 1 % (ref 0–1)
BILIRUB SERPL-MCNC: 0.63 MG/DL (ref 0.2–1)
BUN SERPL-MCNC: 17 MG/DL (ref 5–25)
CALCIUM SERPL-MCNC: 9.8 MG/DL (ref 8.3–10.1)
CHLORIDE SERPL-SCNC: 104 MMOL/L (ref 100–108)
CHOLEST SERPL-MCNC: 170 MG/DL (ref 50–200)
CO2 SERPL-SCNC: 29 MMOL/L (ref 21–32)
CREAT SERPL-MCNC: 1.13 MG/DL (ref 0.6–1.3)
EOSINOPHIL # BLD AUTO: 0.53 THOUSAND/ΜL (ref 0–0.61)
EOSINOPHIL NFR BLD AUTO: 8 % (ref 0–6)
ERYTHROCYTE [DISTWIDTH] IN BLOOD BY AUTOMATED COUNT: 12.9 % (ref 11.6–15.1)
GFR SERPL CREATININE-BSD FRML MDRD: 64 ML/MIN/1.73SQ M
GLUCOSE P FAST SERPL-MCNC: 134 MG/DL (ref 65–99)
HCT VFR BLD AUTO: 53.6 % (ref 36.5–49.3)
HDLC SERPL-MCNC: 33 MG/DL (ref 40–60)
HGB BLD-MCNC: 17.7 G/DL (ref 12–17)
IMM GRANULOCYTES # BLD AUTO: 0.02 THOUSAND/UL (ref 0–0.2)
IMM GRANULOCYTES NFR BLD AUTO: 0 % (ref 0–2)
LDLC SERPL CALC-MCNC: 108 MG/DL (ref 0–100)
LYMPHOCYTES # BLD AUTO: 1.83 THOUSANDS/ΜL (ref 0.6–4.47)
LYMPHOCYTES NFR BLD AUTO: 27 % (ref 14–44)
MCH RBC QN AUTO: 30.5 PG (ref 26.8–34.3)
MCHC RBC AUTO-ENTMCNC: 33 G/DL (ref 31.4–37.4)
MCV RBC AUTO: 92 FL (ref 82–98)
MONOCYTES # BLD AUTO: 0.49 THOUSAND/ΜL (ref 0.17–1.22)
MONOCYTES NFR BLD AUTO: 7 % (ref 4–12)
NEUTROPHILS # BLD AUTO: 3.91 THOUSANDS/ΜL (ref 1.85–7.62)
NEUTS SEG NFR BLD AUTO: 57 % (ref 43–75)
NRBC BLD AUTO-RTO: 0 /100 WBCS
PLATELET # BLD AUTO: 277 THOUSANDS/UL (ref 149–390)
PMV BLD AUTO: 11.3 FL (ref 8.9–12.7)
POTASSIUM SERPL-SCNC: 4.7 MMOL/L (ref 3.5–5.3)
PROT SERPL-MCNC: 8.4 G/DL (ref 6.4–8.2)
PSA SERPL-MCNC: 0.5 NG/ML (ref 0–4)
RBC # BLD AUTO: 5.81 MILLION/UL (ref 3.88–5.62)
SODIUM SERPL-SCNC: 135 MMOL/L (ref 136–145)
TRIGL SERPL-MCNC: 146 MG/DL
WBC # BLD AUTO: 6.83 THOUSAND/UL (ref 4.31–10.16)

## 2019-03-18 PROCEDURE — G0103 PSA SCREENING: HCPCS

## 2019-03-18 PROCEDURE — 80061 LIPID PANEL: CPT

## 2019-03-18 PROCEDURE — 80053 COMPREHEN METABOLIC PANEL: CPT

## 2019-03-18 PROCEDURE — 36415 COLL VENOUS BLD VENIPUNCTURE: CPT

## 2019-03-18 PROCEDURE — 85025 COMPLETE CBC W/AUTO DIFF WBC: CPT

## 2019-03-26 ENCOUNTER — OFFICE VISIT (OUTPATIENT)
Dept: FAMILY MEDICINE CLINIC | Facility: CLINIC | Age: 74
End: 2019-03-26
Payer: COMMERCIAL

## 2019-03-26 VITALS
RESPIRATION RATE: 20 BRPM | WEIGHT: 236.2 LBS | DIASTOLIC BLOOD PRESSURE: 84 MMHG | HEIGHT: 69 IN | BODY MASS INDEX: 34.98 KG/M2 | SYSTOLIC BLOOD PRESSURE: 120 MMHG | HEART RATE: 60 BPM | TEMPERATURE: 97.3 F

## 2019-03-26 DIAGNOSIS — E11.9 TYPE 2 DIABETES MELLITUS WITHOUT COMPLICATION, UNSPECIFIED WHETHER LONG TERM INSULIN USE (HCC): Primary | ICD-10-CM

## 2019-03-26 DIAGNOSIS — E66.9 OBESITY (BMI 30.0-34.9): ICD-10-CM

## 2019-03-26 DIAGNOSIS — E11.9 TYPE 2 DIABETES MELLITUS WITHOUT COMPLICATION, UNSPECIFIED WHETHER LONG TERM INSULIN USE (HCC): ICD-10-CM

## 2019-03-26 DIAGNOSIS — D75.1 POLYCYTHEMIA: ICD-10-CM

## 2019-03-26 DIAGNOSIS — E78.00 HYPERCHOLESTEROLEMIA: ICD-10-CM

## 2019-03-26 LAB — SL AMB POCT HEMOGLOBIN AIC: 7.1 (ref ?–6.5)

## 2019-03-26 PROCEDURE — 3008F BODY MASS INDEX DOCD: CPT | Performed by: INTERNAL MEDICINE

## 2019-03-26 PROCEDURE — 83036 HEMOGLOBIN GLYCOSYLATED A1C: CPT | Performed by: INTERNAL MEDICINE

## 2019-03-26 PROCEDURE — 1036F TOBACCO NON-USER: CPT | Performed by: INTERNAL MEDICINE

## 2019-03-26 PROCEDURE — 3045F PR MOST RECENT HEMOGLOBIN A1C LEVEL 7.0-9.0%: CPT | Performed by: INTERNAL MEDICINE

## 2019-03-26 PROCEDURE — 1160F RVW MEDS BY RX/DR IN RCRD: CPT | Performed by: INTERNAL MEDICINE

## 2019-03-26 PROCEDURE — 99214 OFFICE O/P EST MOD 30 MIN: CPT | Performed by: INTERNAL MEDICINE

## 2019-03-26 RX ORDER — ATORVASTATIN CALCIUM 10 MG/1
10 TABLET, FILM COATED ORAL DAILY
Qty: 90 TABLET | Refills: 1 | Status: SHIPPED | OUTPATIENT
Start: 2019-03-26 | End: 2019-07-01 | Stop reason: SDUPTHER

## 2019-03-26 NOTE — PATIENT INSTRUCTIONS
Obesity   AMBULATORY CARE:   Obesity  is when your body mass index (BMI) is greater than 30  Your healthcare provider will use your height and weight to measure your BMI  The risks of obesity include  many health problems, such as injuries or physical disability  You may need tests to check for the following:  · Diabetes     · High blood pressure or high cholesterol     · Heart disease     · Gallbladder or liver disease     · Cancer of the colon, breast, prostate, liver, or kidney     · Sleep apnea     · Arthritis or gout  Seek care immediately if:   · You have a severe headache, confusion, or difficulty speaking  · You have weakness on one side of your body  · You have chest pain, sweating, or shortness of breath  Contact your healthcare provider if:   · You have symptoms of gallbladder or liver disease, such as pain in your upper abdomen  · You have knee or hip pain and discomfort while walking  · You have symptoms of diabetes, such as intense hunger and thirst, and frequent urination  · You have symptoms of sleep apnea, such as snoring or daytime sleepiness  · You have questions or concerns about your condition or care  Treatment for obesity  focuses on helping you lose weight to improve your health  Even a small decrease in BMI can reduce the risk for many health problems  Your healthcare provider will help you set a weight-loss goal   · Lifestyle changes  are the first step in treating obesity  These include making healthy food choices and getting regular physical activity  Your healthcare provider may suggest a weight-loss program that involves coaching, education, and therapy  · Medicine  may help you lose weight when it is used with a healthy diet and physical activity  · Surgery  can help you lose weight if you are very obese and have other health problems  There are several types of weight-loss surgery  Ask your healthcare provider for more information    Be successful losing weight:   · Set small, realistic goals  An example of a small goal is to walk for 20 minutes 5 days a week  Anther goal is to lose 5% of your body weight  · Tell friends, family members, and coworkers about your goals  and ask for their support  Ask a friend to lose weight with you, or join a weight-loss support group  · Identify foods or triggers that may cause you to overeat , and find ways to avoid them  Remove tempting high-calorie foods from your home and workplace  Place a bowl of fresh fruit on your kitchen counter  If stress causes you to eat, then find other ways to cope with stress  · Keep a diary to track what you eat and drink  Also write down how many minutes of physical activity you do each day  Weigh yourself once a week and record it in your diary  Eating changes: You will need to eat 500 to 1,000 fewer calories each day than you currently eat to lose 1 to 2 pounds a week  The following changes will help you cut calories:  · Eat smaller portions  Use small plates, no larger than 9 inches in diameter  Fill your plate half full of fruits and vegetables  Measure your food using measuring cups until you know what a serving size looks like  · Eat 3 meals and 1 or 2 snacks each day  Plan your meals in advance  Thania Dry and eat at home most of the time  Eat slowly  · Eat fruits and vegetables at every meal   They are low in calories and high in fiber, which makes you feel full  Do not add butter, margarine, or cream sauce to vegetables  Use herbs to season steamed vegetables  · Eat less fat and fewer fried foods  Eat more baked or grilled chicken and fish  These protein sources are lower in calories and fat than red meat  Limit fast food  Dress your salads with olive oil and vinegar instead of bottled dressing  · Limit the amount of sugar you eat  Do not drink sugary beverages  Limit alcohol  Activity changes:  Physical activity is good for your body in many ways   It helps you burn calories and build strong muscles  It decreases stress and depression, and improves your mood  It can also help you sleep better  Talk to your healthcare provider before you begin an exercise program   · Exercise for at least 30 minutes 5 days a week  Start slowly  Set aside time each day for physical activity that you enjoy and that is convenient for you  It is best to do both weight training and an activity that increases your heart rate, such as walking, bicycling, or swimming  · Find ways to be more active  Do yard work and housecleaning  Walk up the stairs instead of using elevators  Spend your leisure time going to events that require walking, such as outdoor festivals or fairs  This extra physical activity can help you lose weight and keep it off  Follow up with your healthcare provider as directed: You may need to meet with a dietitian  Write down your questions so you remember to ask them during your visits  © 2017 [address] Information is for End User's use only and may not be sold, redistributed or otherwise used for commercial purposes  All illustrations and images included in CareNotes® are the copyrighted property of [employer]  or Vincent Beebe  The above information is an  only  It is not intended as medical advice for individual conditions or treatments  Talk to your doctor, nurse or pharmacist before following any medical regimen to see if it is safe and effective for you  Weight Management   AMBULATORY CARE:   Why it is important to manage your weight:  Being overweight increases your risk of health conditions such as heart disease, high blood pressure, type 2 diabetes, and certain types of cancer  It can also increase your risk for osteoarthritis, sleep apnea, and other respiratory problems  Aim for a slow, steady weight loss  Even a small amount of weight loss can lower your risk of health problems    How to lose weight safely:  A safe and healthy way to lose weight is to eat fewer calories and get regular exercise  You can lose up about 1 pound a week by decreasing the number of calories you eat by 500 calories each day  You can decrease calories by eating smaller portion sizes or by cutting out high-calorie foods  Read labels to find out how many calories are in the foods you eat  You can also burn calories with exercise such as walking, swimming, or biking  You will be more likely to keep weight off if you make these changes part of your lifestyle  Healthy meal plan for weight management:  A healthy meal plan includes a variety of foods, contains fewer calories, and helps you stay healthy  A healthy meal plan includes the following:  · Eat whole-grain foods more often  A healthy meal plan should contain fiber  Fiber is the part of grains, fruits, and vegetables that is not broken down by your body  Whole-grain foods are healthy and provide extra fiber in your diet  Some examples of whole-grain foods are whole-wheat breads and pastas, oatmeal, brown rice, and bulgur  · Eat a variety of vegetables every day  Include dark, leafy greens such as spinach, kale, otilio greens, and mustard greens  Eat yellow and orange vegetables such as carrots, sweet potatoes, and winter squash  · Eat a variety of fruits every day  Choose fresh or canned fruit (canned in its own juice or light syrup) instead of juice  Fruit juice has very little or no fiber  · Eat low-fat dairy foods  Drink fat-free (skim) milk or 1% milk  Eat fat-free yogurt and low-fat cottage cheese  Try low-fat cheeses such as mozzarella and other reduced-fat cheeses  · Choose meat and other protein foods that are low in fat  Choose beans or other legumes such as split peas or lentils  Choose fish, skinless poultry (chicken or turkey), or lean cuts of red meat (beef or pork)  Before you cook meat or poultry, cut off any visible fat  · Use less fat and oil  Try baking foods instead of frying them  Add less fat, such as margarine, sour cream, regular salad dressing and mayonnaise to foods  Eat fewer high-fat foods  Some examples of high-fat foods include french fries, doughnuts, ice cream, and cakes  · Eat fewer sweets  Limit foods and drinks that are high in sugar  This includes candy, cookies, regular soda, and sweetened drinks  Ways to decrease calories:   · Eat smaller portions  ¨ Use a small plate with smaller servings  ¨ Do not eat second helpings  ¨ When you eat at a restaurant, ask for a box and place half of your meal in the box before you eat  ¨ Share an entrée with someone else  · Replace high-calorie snacks with healthy, low-calorie snacks  ¨ Choose fresh fruit, vegetables, fat-free rice cakes, or air-popped popcorn instead of potato chips, nuts, or chocolate  ¨ Choose water or calorie-free drinks instead of soda or sweetened drinks  · Eat regular meals  Skipping meals can lead to overeating later in the day  Eat a healthy snack in place of a meal if you do not have time to eat a regular meal      · Do not shop for groceries when you are hungry  You may be more likely to make unhealthy food choices  Take a grocery list of healthy foods and shop after you have eaten  Exercise:  Exercise at least 30 minutes per day on most days of the week  Some examples of exercise include walking, biking, dancing, and swimming  You can also fit in more physical activity by taking the stairs instead of the elevator or parking farther away from stores  Ask your healthcare provider about the best exercise plan for you  Other things to consider as you try to lose weight:   · Be aware of situations that may give you the urge to overeat, such as eating while watching television  Find ways to avoid these situations  For example, read a book, go for a walk, or do crafts      · Meet with a weight loss support group or friends who are also trying to lose weight  This may help you stay motivated to continue working on your weight loss goals  © 2017 [address] Information is for End User's use only and may not be sold, redistributed or otherwise used for commercial purposes  All illustrations and images included in CareNotes® are the copyrighted property of [employer]  or Vincent Beebe  The above information is an  only  It is not intended as medical advice for individual conditions or treatments  Talk to your doctor, nurse or pharmacist before following any medical regimen to see if it is safe and effective for you  Calorie Counting Diet   WHAT YOU NEED TO KNOW:   What is a calorie counting diet? It is a meal plan based on counting calories each day to reach a healthy body weight  You will need to eat fewer calories if you are trying to lose weight  Weight loss may decrease your risk for certain health problems or improve your health if you have health problems  Some of these health problems include heart disease, high blood pressure, and diabetes  What foods should I avoid? Your dietitian will tell you if you need to avoid certain foods based on your body weight and health condition  You may need to avoid high-fat foods if you are at risk for or have heart disease  You may need to eat fewer foods from the breads and starches food group if you have diabetes  How many calories are in foods? The following is a list of foods and drinks with the approximate number of calories in each  Check the food label to find the exact number of calories  A dietitian can tell you how many calories you should have from each food group each day    · Carbohydrate:      ¨ ½ of a 3-inch bagel, 1 slice of bread, or ½ of a hamburger bun or hot dog bun (80)    ¨ 1 (8-inch) flour tortilla or ½ cup of cooked rice (100)    ¨ 1 (6-inch) corn tortilla (80)    ¨ 1 (6-inch) pancake or 1 cup of bran flakes cereal (110)    ¨ ½ cup of cooked cereal (80)    ¨ ½ cup of cooked pasta (85)    ¨ 1 ounce of pretzels (100)    ¨ 3 cups of air-popped popcorn without butter or oil (80)    · Dairy:      ¨ 1 cup of skim or 1% milk (90)    ¨ 1 cup of 2% milk (120)    ¨ 1 cup of whole milk (160)    ¨ 1 cup of 2% chocolate milk (220)    ¨ 1 ounce of low-fat cheese with 3 grams of fat per ounce (70)    ¨ 1 ounce of cheddar cheese (114)    ¨ ½ cup of 1% fat cottage cheese (80)    ¨ 1 cup of plain or sugar-free, fat-free yogurt (90)    · Protein foods:      ¨ 3 ounces of fish (not breaded or fried) (95)    ¨ 3 ounces of breaded, fried fish (195)    ¨ ¾ cup of tuna canned in water (105)    ¨ 3 ounces of chicken breast without skin (105)    ¨ 1 fried chicken breast with skin (350)    ¨ ¼ cup of fat free egg substitute (40)    ¨ 1 large egg (75)    ¨ 3 ounces of lean beef or pork (165)    ¨ 3 ounces of fried pork chop or ham (185)    ¨ ½ cup of cooked dried beans, such as kidney, mon, lentils, or navy (115)    ¨ 3 ounces of bologna or lunch meat (225)    ¨ 2 links of breakfast sausage (140)    · Vegetables:      ¨ ½ cup of sliced mushrooms (10)    ¨ 1 cup of salad greens, such as lettuce, spinach, or belle (15)    ¨ ½ cup of steamed asparagus (20)    ¨ ½ cup of cooked summer squash, zucchini squash, or green or wax beans (25)    ¨ 1 cup of broccoli or cauliflower florets, or 1 medium tomato (25)    ¨ 1 large raw carrot or ½ cup of cooked carrots (40)    ¨ ? of a medium cucumber or 1 stalk of celery (5)    ¨ 1 small baked potato (160)    ¨ 1 cup of breaded, fried vegetables (230)    · Fruit:      ¨ 1 (6-inch) banana (55)     ¨ ½ of a 4-inch grapefruit (55)    ¨ 15 grapes (60)    ¨ 1 medium orange or apple (70)    ¨ 1 large peach (65)    ¨ 1 cup of fresh pineapple chunks (75)    ¨ 1 cup of melon cubes (50)    ¨ 1¼ cups of whole strawberries (45)    ¨ ½ cup of fruit canned in juice (55)    ¨ ½ cup of fruit canned in heavy syrup (110)    ¨ ?  cup of raisins (130)    ¨ ½ cup of unsweetened fruit juice (60)    ¨ ½ cup of grape, cranberry, or prune juice (90)    · Fat:      ¨ 10 peanuts or 2 teaspoons of peanut butter (55)    ¨ 2 tablespoons of avocado or 1 tablespoon of regular salad dressing (45)    ¨ 2 slices of thorpe (90)    ¨ 1 teaspoon of oil, such as safflower, canola, corn, or olive oil (45)    ¨ 2 teaspoons of low-fat margarine, or 1 tablespoon of low-fat mayonnaise (50)    ¨ 1 teaspoon of regular margarine (40)    ¨ 1 tablespoon of regular mayonnaise (135)    ¨ 1 tablespoon of cream cheese or 2 tablespoons of low-fat cream cheese (45)    ¨ 2 tablespoons of vegetable shortening (215)    · Dessert and sweets:      ¨ 8 animal crackers or 5 vanilla wafers (80)    ¨ 1 frozen fruit juice bar (80)    ¨ ½ cup of ice milk or low-fat frozen yogurt (90)    ¨ ½ cup of sherbet or sorbet (125)    ¨ ½ cup of sugar-free pudding or custard (60)    ¨ ½ cup of ice cream (140)    ¨ ½ cup of pudding or custard (175)    ¨ 1 (2-inch) square chocolate brownie (185)    · Combination foods:      ¨ Bean burrito made with an 8-inch tortilla, without cheese (275)    ¨ Chicken breast sandwich with lettuce and tomato (325)    ¨ 1 cup of chicken noodle soup (60)    ¨ 1 beef taco (175)    ¨ Regular hamburger with lettuce and tomato (310)    ¨ Regular cheeseburger with lettuce and tomato (410)     ¨ ¼ of a 12-inch cheese pizza (280)    ¨ Fried fish sandwich with lettuce and tomato (425)    ¨ Hot dog and bun (275)    ¨ 1½ cups of macaroni and cheese (310)    ¨ Taco salad with a fried tortilla shell (870)    · Low-calorie foods:      ¨ 1 tablespoon of ketchup or 1 tablespoon of fat free sour cream (15)    ¨ 1 teaspoon of mustard (5)    ¨ ¼ cup of salsa (20)    ¨ 1 large dill pickle (15)    ¨ 1 tablespoon of fat free salad dressing (10)    ¨ 2 teaspoons of low-sugar, light jam or jelly, or 1 tablespoon of sugar-free syrup (15)    ¨ 1 sugar-free popsicle (15)    ¨ 1 cup of club soda, seltzer water, or diet soda (0)  CARE AGREEMENT:   You have the right to help plan your care  Discuss treatment options with your caregivers to decide what care you want to receive  You always have the right to refuse treatment  The above information is an  only  It is not intended as medical advice for individual conditions or treatments  Talk to your doctor, nurse or pharmacist before following any medical regimen to see if it is safe and effective for you  © 2017 [address] Information is for End User's use only and may not be sold, redistributed or otherwise used for commercial purposes  All illustrations and images included in CareNotes® are the copyrighted property of [employer]  or Vincent Beebe

## 2019-03-26 NOTE — PROGRESS NOTES
Assessment/Plan:       Diagnoses and all orders for this visit:    Type 2 diabetes mellitus without complication, unspecified whether long term insulin use (HCC)  -     POCT hemoglobin A1c  -     Linagliptin 5 MG TABS; Take 5 mg by mouth daily for 180 days    Hypercholesterolemia  -     atorvastatin (LIPITOR) 10 mg tablet; Take 1 tablet (10 mg total) by mouth daily for 180 days  -     LDL cholesterol, direct; Future    Polycythemia    Obesity (BMI 30 0-34 9)    Other orders  -     Cancel: Microalbumin / creatinine urine ratio        No problem-specific Assessment & Plan notes found for this encounter  We will follow up in the next several weeks and see how things go  Subjective:      Patient ID: Jonathan Guevara is a 69 y o  male  The patient was seen and examined and noted to have issues with an elevated LDL cholesterol  We discussed starting a statin, but the patient was resistant to this  We discussed the class of medication at length and the patient finally agreed to try a small dose  We will follow up on the LDL cholesterol in 3 months  The patient states that he does not want to take the Amaryl anymore  He notes a bad taste and there is a mild increase of the patient's bilirubin  The patient would now like to try Trejenta  The patient is agreeable to this and we will try this  The patient will follow up in 3 months with a repeat LDL cholesterol  We will see how the patient does  The patient was noted to have an elevated RBC and we discussed the causes of polycythemia  He notes that he does snore and we did discuss the diagnosis of CAMILLE (he does not smoke)  He was resistant to a sleep study  We discussed concerns regarding CAMILLE        The following portions of the patient's history were reviewed and updated as appropriate:   He has a past medical history of Arthritis of knee, right, Diabetes mellitus ([address] ), History of gastroesophageal reflux (GERD), Hypercholesterolemia, Morbid (severe) obesity due to excess calories ([facility] [address] ), Papular rash, Pes anserine bursitis, Sebaceous cyst, Testicular hypogonadism, and Tick bite ,  does not have any pertinent problems on file  ,   has a past surgical history that includes Wrist fusion (Bilateral) and Elbow surgery (Right)  ,  family history includes Diabetes type II in his mother; Heart disease in his mother; No Known Problems in his father; Rheumatic fever in his mother  ,   reports that he has quit smoking  He has never used smokeless tobacco  He reports that he drinks alcohol  He reports that he does not use drugs  ,  is allergic to pollen extract     Current Outpatient Medications   Medication Sig Dispense Refill    aspirin 81 MG tablet Take 81 mg by mouth once        diphenhydrAMINE (BENADRYL ALLERGY) 25 mg capsule Take 25 mg by mouth daily at bedtime as needed for itching      glucose blood (ONE TOUCH ULTRA TEST) test strip 1 Squirt by In Vitro route daily      lisinopril-hydrochlorothiazide (PRINZIDE,ZESTORETIC) 20-12 5 MG per tablet Take 1 tablet by mouth daily 90 tablet 1    Omega-3 Fatty Acids (FISH OIL) 1,000 mg Take 1,000 mg by mouth daily      Triamcinolone Acetonide (NASACORT ALLERGY 24HR) 55 MCG/ACT AERO into each nostril      atorvastatin (LIPITOR) 10 mg tablet Take 1 tablet (10 mg total) by mouth daily for 180 days 90 tablet 1    Linagliptin 5 MG TABS Take 5 mg by mouth daily for 180 days 90 tablet 1     No current facility-administered medications for this visit  Review of Systems   Constitutional: Negative for chills, fatigue and fever  HENT: Negative for ear pain, sinus pressure, sinus pain, sneezing and sore throat  Respiratory: Negative for cough, chest tightness and shortness of breath  Cardiovascular: Negative for chest pain, palpitations and leg swelling  Gastrointestinal: Negative for abdominal pain, blood in stool, constipation, diarrhea and vomiting  Genitourinary: Negative      Musculoskeletal: Negative for back pain and myalgias  Skin: Negative  Neurological: Negative for seizures, speech difficulty and headaches  Psychiatric/Behavioral: Negative  Objective:  Vitals:    03/26/19 0828   BP: 120/84   BP Location: Left arm   Patient Position: Sitting   Cuff Size: Large   Pulse: 60   Resp: 20   Temp: (!) 97 3 °F (36 3 °C)   TempSrc: Tympanic   Weight: 107 kg (236 lb 3 2 oz)   Height: 5' 9" (1 753 m)     Body mass index is 34 88 kg/m²  Physical Exam   Constitutional: He is oriented to person, place, and time  He appears well-developed and well-nourished  HENT:   Head: Normocephalic and atraumatic  Eyes: Pupils are equal, round, and reactive to light  EOM are normal    Neck: Normal range of motion  Neck supple  Cardiovascular: Normal rate and regular rhythm  Exam reveals no friction rub  No murmur heard  Pulmonary/Chest: Effort normal and breath sounds normal  No stridor  No respiratory distress  Abdominal: Soft  Bowel sounds are normal  He exhibits no distension  There is no tenderness  Musculoskeletal: Normal range of motion  He exhibits no edema  Neurological: He is alert and oriented to person, place, and time  Skin: Skin is warm and dry  Psychiatric: He has a normal mood and affect  Nursing note and vitals reviewed  BMI Counseling: Body mass index is 34 88 kg/m²  Discussed the patient's BMI with him  The BMI is above average  BMI counseling and education was provided to the patient  Nutrition recommendations include reducing portion sizes

## 2019-05-05 DIAGNOSIS — I10 ESSENTIAL HYPERTENSION: ICD-10-CM

## 2019-05-06 RX ORDER — LISINOPRIL AND HYDROCHLOROTHIAZIDE 20; 12.5 MG/1; MG/1
TABLET ORAL
Qty: 90 TABLET | Refills: 1 | Status: SHIPPED | OUTPATIENT
Start: 2019-05-06 | End: 2019-09-03

## 2019-06-24 ENCOUNTER — APPOINTMENT (OUTPATIENT)
Dept: LAB | Facility: CLINIC | Age: 74
End: 2019-06-24
Payer: COMMERCIAL

## 2019-06-24 ENCOUNTER — TRANSCRIBE ORDERS (OUTPATIENT)
Dept: LAB | Facility: CLINIC | Age: 74
End: 2019-06-24

## 2019-06-24 DIAGNOSIS — E78.00 HYPERCHOLESTEROLEMIA: ICD-10-CM

## 2019-06-24 LAB — LDLC SERPL DIRECT ASSAY-MCNC: 109 MG/DL (ref 0–100)

## 2019-06-24 PROCEDURE — 83721 ASSAY OF BLOOD LIPOPROTEIN: CPT

## 2019-06-24 PROCEDURE — 36415 COLL VENOUS BLD VENIPUNCTURE: CPT

## 2019-07-01 ENCOUNTER — OFFICE VISIT (OUTPATIENT)
Dept: INTERNAL MEDICINE CLINIC | Facility: CLINIC | Age: 74
End: 2019-07-01
Payer: COMMERCIAL

## 2019-07-01 VITALS
RESPIRATION RATE: 16 BRPM | OXYGEN SATURATION: 98 % | HEIGHT: 69 IN | TEMPERATURE: 97.9 F | HEART RATE: 64 BPM | SYSTOLIC BLOOD PRESSURE: 138 MMHG | BODY MASS INDEX: 33.8 KG/M2 | DIASTOLIC BLOOD PRESSURE: 84 MMHG | WEIGHT: 228.2 LBS

## 2019-07-01 DIAGNOSIS — E78.00 HYPERCHOLESTEROLEMIA: ICD-10-CM

## 2019-07-01 DIAGNOSIS — E11.9 TYPE 2 DIABETES MELLITUS WITHOUT COMPLICATION, UNSPECIFIED WHETHER LONG TERM INSULIN USE (HCC): ICD-10-CM

## 2019-07-01 DIAGNOSIS — E11.9 TYPE 2 DIABETES MELLITUS WITHOUT COMPLICATION, WITHOUT LONG-TERM CURRENT USE OF INSULIN (HCC): Primary | ICD-10-CM

## 2019-07-01 DIAGNOSIS — E66.9 OBESITY (BMI 30.0-34.9): ICD-10-CM

## 2019-07-01 LAB — SL AMB POCT HEMOGLOBIN AIC: 8.4 (ref ?–6.5)

## 2019-07-01 PROCEDURE — 99214 OFFICE O/P EST MOD 30 MIN: CPT | Performed by: INTERNAL MEDICINE

## 2019-07-01 PROCEDURE — 1160F RVW MEDS BY RX/DR IN RCRD: CPT | Performed by: INTERNAL MEDICINE

## 2019-07-01 PROCEDURE — 1036F TOBACCO NON-USER: CPT | Performed by: INTERNAL MEDICINE

## 2019-07-01 PROCEDURE — 83036 HEMOGLOBIN GLYCOSYLATED A1C: CPT | Performed by: INTERNAL MEDICINE

## 2019-07-01 PROCEDURE — 3008F BODY MASS INDEX DOCD: CPT | Performed by: INTERNAL MEDICINE

## 2019-07-01 PROCEDURE — 3045F PR MOST RECENT HEMOGLOBIN A1C LEVEL 7.0-9.0%: CPT | Performed by: INTERNAL MEDICINE

## 2019-07-01 RX ORDER — ATORVASTATIN CALCIUM 10 MG/1
10 TABLET, FILM COATED ORAL DAILY
Qty: 90 TABLET | Refills: 1 | Status: SHIPPED | OUTPATIENT
Start: 2019-07-01 | End: 2019-12-13 | Stop reason: SDUPTHER

## 2019-07-01 RX ORDER — GLIMEPIRIDE 1 MG/1
1 TABLET ORAL
Qty: 90 TABLET | Refills: 1
Start: 2019-07-01 | End: 2019-09-18

## 2019-07-01 NOTE — PATIENT INSTRUCTIONS
Obesity   AMBULATORY CARE:   Obesity  is when your body mass index (BMI) is greater than 30  Your healthcare provider will use your height and weight to measure your BMI  The risks of obesity include  many health problems, such as injuries or physical disability  You may need tests to check for the following:  · Diabetes     · High blood pressure or high cholesterol     · Heart disease     · Gallbladder or liver disease     · Cancer of the colon, breast, prostate, liver, or kidney     · Sleep apnea     · Arthritis or gout  Seek care immediately if:   · You have a severe headache, confusion, or difficulty speaking  · You have weakness on one side of your body  · You have chest pain, sweating, or shortness of breath  Contact your healthcare provider if:   · You have symptoms of gallbladder or liver disease, such as pain in your upper abdomen  · You have knee or hip pain and discomfort while walking  · You have symptoms of diabetes, such as intense hunger and thirst, and frequent urination  · You have symptoms of sleep apnea, such as snoring or daytime sleepiness  · You have questions or concerns about your condition or care  Treatment for obesity  focuses on helping you lose weight to improve your health  Even a small decrease in BMI can reduce the risk for many health problems  Your healthcare provider will help you set a weight-loss goal   · Lifestyle changes  are the first step in treating obesity  These include making healthy food choices and getting regular physical activity  Your healthcare provider may suggest a weight-loss program that involves coaching, education, and therapy  · Medicine  may help you lose weight when it is used with a healthy diet and physical activity  · Surgery  can help you lose weight if you are very obese and have other health problems  There are several types of weight-loss surgery  Ask your healthcare provider for more information    Be successful losing weight:   · Set small, realistic goals  An example of a small goal is to walk for 20 minutes 5 days a week  Anther goal is to lose 5% of your body weight  · Tell friends, family members, and coworkers about your goals  and ask for their support  Ask a friend to lose weight with you, or join a weight-loss support group  · Identify foods or triggers that may cause you to overeat , and find ways to avoid them  Remove tempting high-calorie foods from your home and workplace  Place a bowl of fresh fruit on your kitchen counter  If stress causes you to eat, then find other ways to cope with stress  · Keep a diary to track what you eat and drink  Also write down how many minutes of physical activity you do each day  Weigh yourself once a week and record it in your diary  Eating changes: You will need to eat 500 to 1,000 fewer calories each day than you currently eat to lose 1 to 2 pounds a week  The following changes will help you cut calories:  · Eat smaller portions  Use small plates, no larger than 9 inches in diameter  Fill your plate half full of fruits and vegetables  Measure your food using measuring cups until you know what a serving size looks like  · Eat 3 meals and 1 or 2 snacks each day  Plan your meals in advance  Cheryl Bundy and eat at home most of the time  Eat slowly  · Eat fruits and vegetables at every meal   They are low in calories and high in fiber, which makes you feel full  Do not add butter, margarine, or cream sauce to vegetables  Use herbs to season steamed vegetables  · Eat less fat and fewer fried foods  Eat more baked or grilled chicken and fish  These protein sources are lower in calories and fat than red meat  Limit fast food  Dress your salads with olive oil and vinegar instead of bottled dressing  · Limit the amount of sugar you eat  Do not drink sugary beverages  Limit alcohol  Activity changes:  Physical activity is good for your body in many ways   It helps you burn calories and build strong muscles  It decreases stress and depression, and improves your mood  It can also help you sleep better  Talk to your healthcare provider before you begin an exercise program   · Exercise for at least 30 minutes 5 days a week  Start slowly  Set aside time each day for physical activity that you enjoy and that is convenient for you  It is best to do both weight training and an activity that increases your heart rate, such as walking, bicycling, or swimming  · Find ways to be more active  Do yard work and housecleaning  Walk up the stairs instead of using elevators  Spend your leisure time going to events that require walking, such as outdoor festivals or fairs  This extra physical activity can help you lose weight and keep it off  Follow up with your healthcare provider as directed: You may need to meet with a dietitian  Write down your questions so you remember to ask them during your visits  © 2017 [employer] [employer] Information is for End User's use only and may not be sold, redistributed or otherwise used for commercial purposes  All illustrations and images included in CareNotes® are the copyrighted property of A D A M , Inc  or Vincent Beebe  The above information is an  only  It is not intended as medical advice for individual conditions or treatments  Talk to your doctor, nurse or pharmacist before following any medical regimen to see if it is safe and effective for you

## 2019-07-01 NOTE — PROGRESS NOTES
BMI Counseling: Body mass index is 33 7 kg/m²  Discussed the patient's BMI with him  The BMI is above average  BMI counseling and education was provided to the patient  Nutrition recommendations include decreasing overall calorie intake  Assessment/Plan:       Diagnoses and all orders for this visit:    Type 2 diabetes mellitus without complication, without long-term current use of insulin (HCC)  -     POCT hemoglobin A1c  -     Microalbumin / creatinine urine ratio  -     glimepiride (AMARYL) 1 mg tablet; Take 1 tablet (1 mg total) by mouth daily with breakfast for 90 days  -     HEMOGLOBIN A1C W/ EAG ESTIMATION; Future    Hypercholesterolemia  -     atorvastatin (LIPITOR) 10 mg tablet; Take 1 tablet (10 mg total) by mouth daily for 180 days    Type 2 diabetes mellitus without complication, unspecified whether long term insulin use (HCC)  -     linaGLIPtin 5 MG TABS; Take 5 mg by mouth daily for 180 days    Obesity (BMI 30 0-34 9)  -     Ambulatory referral to Nutrition Services; Future        No problem-specific Assessment & Plan notes found for this encounter  We will follow up in the next several weeks and see how things go  Subjective:      Patient ID: Shay Esquivel is a 69 y o  male  Diabetes   He presents for his follow-up diabetic visit  He has type 2 diabetes mellitus  His disease course has been stable  There are no hypoglycemic associated symptoms  Pertinent negatives for diabetes include no blurred vision, no chest pain, no fatigue, no foot paresthesias, no foot ulcerations, no polydipsia, no polyphagia, no polyuria, no visual change, no weakness and no weight loss  Symptoms are stable  Diabetic complications include peripheral neuropathy  Pertinent negatives for diabetic complications include no PVD or retinopathy  Risk factors for coronary artery disease include male sex, hypertension, diabetes mellitus and dyslipidemia  Current diabetic treatment includes oral agent (dual therapy)   His weight is stable  An ACE inhibitor/angiotensin II receptor blocker is being taken  He sees a podiatrist Eye exam is current  Hypertension   This is a chronic problem  The current episode started more than 1 year ago  The problem is controlled  Pertinent negatives include no blurred vision, chest pain, palpitations or shortness of breath  There are no associated agents to hypertension  Past treatments include ACE inhibitors and diuretics  The current treatment provides significant improvement  There are no compliance problems  There is no history of angina, kidney disease, CAD/MI, heart failure, left ventricular hypertrophy, PVD or retinopathy  The following portions of the patient's history were reviewed and updated as appropriate:   He has a past medical history of Arthritis of knee, right, Diabetes mellitus ([address] ), History of gastroesophageal reflux (GERD), Hypercholesterolemia, Morbid (severe) obesity due to excess calories ([address] ), Papular rash, Pes anserine bursitis, Sebaceous cyst, Testicular hypogonadism, and Tick bite ,  does not have any pertinent problems on file  ,   has a past surgical history that includes Wrist fusion (Bilateral) and Elbow surgery (Right)  ,  family history includes Diabetes type II in his mother; Heart disease in his mother; No Known Problems in his father; Rheumatic fever in his mother  ,   reports that he has quit smoking  He has never used smokeless tobacco  He reports that he drinks alcohol  He reports that he does not use drugs  ,  is allergic to pollen extract     Current Outpatient Medications   Medication Sig Dispense Refill    aspirin 81 MG tablet Take 81 mg by mouth once        diphenhydrAMINE (BENADRYL ALLERGY) 25 mg capsule Take 25 mg by mouth daily at bedtime as needed for itching      glucose blood (ONE TOUCH ULTRA TEST) test strip 1 Squirt by In Vitro route daily      linaGLIPtin 5 MG TABS Take 5 mg by mouth daily for 180 days 90 tablet 1    lisinopril-hydrochlorothiazide (PRINZIDE,ZESTORETIC) 20-12 5 MG per tablet TAKE ONE TABLET BY MOUTH EVERY DAY 90 tablet 1    Omega-3 Fatty Acids (FISH OIL) 1,000 mg Take 1,000 mg by mouth daily      Triamcinolone Acetonide (NASACORT ALLERGY 24HR) 55 MCG/ACT AERO into each nostril      atorvastatin (LIPITOR) 10 mg tablet Take 1 tablet (10 mg total) by mouth daily for 180 days 90 tablet 1    glimepiride (AMARYL) 1 mg tablet Take 1 tablet (1 mg total) by mouth daily with breakfast for 90 days 90 tablet 1     No current facility-administered medications for this visit  Review of Systems   Constitutional: Negative for fatigue and weight loss  HENT: Negative  Eyes: Negative for blurred vision  Respiratory: Negative for chest tightness and shortness of breath  Cardiovascular: Negative for chest pain, palpitations and leg swelling  Gastrointestinal: Negative for abdominal pain, constipation, diarrhea, nausea and vomiting  Endocrine: Negative for polydipsia, polyphagia and polyuria  Genitourinary: Negative  Musculoskeletal: Negative for arthralgias, joint swelling and myalgias  Neurological: Negative for weakness  Psychiatric/Behavioral: Negative  Objective:  Vitals:    07/01/19 0737 07/01/19 0822   BP: 146/84 138/84   BP Location: Left arm    Patient Position: Sitting    Cuff Size: Large    Pulse: 64    Resp: 16    Temp: 97 9 °F (36 6 °C)    SpO2: 98%    Weight: 104 kg (228 lb 3 2 oz)    Height: 5' 9" (1 753 m)      Body mass index is 33 7 kg/m²  Physical Exam   Constitutional: He is oriented to person, place, and time  He appears well-developed and well-nourished  HENT:   Head: Normocephalic and atraumatic  Eyes: Pupils are equal, round, and reactive to light  EOM are normal    Neck: Normal range of motion  Neck supple  Cardiovascular: Normal rate, regular rhythm, normal heart sounds and intact distal pulses  Exam reveals no friction rub  Pulses are no weak pulses     No murmur heard  Pulses:       Dorsalis pedis pulses are 2+ on the right side, and 2+ on the left side  Posterior tibial pulses are 2+ on the right side, and 2+ on the left side  Pulmonary/Chest: Effort normal    Abdominal: Soft  Bowel sounds are normal  He exhibits no distension  There is no tenderness  There is no guarding  Musculoskeletal: Normal range of motion  He exhibits no edema or tenderness  Feet:   Right Foot:   Skin Integrity: Positive for callus  Negative for ulcer, skin breakdown, erythema, warmth or dry skin  Left Foot:   Skin Integrity: Positive for callus  Negative for ulcer, skin breakdown, erythema, warmth or dry skin  Neurological: He is alert and oriented to person, place, and time  Skin: Skin is warm and dry  Psychiatric: He has a normal mood and affect  Nursing note and vitals reviewed  Patient's shoes and socks removed  Right Foot/Ankle   Right Foot Inspection  Skin Exam: skin intact, callus and callus no dry skin, no warmth, no erythema, no maceration, no abnormal color, no pre-ulcer and no ulcer                          Toe Exam: ROM and strength within normal limits  Sensory       Monofilament testing: diminished  Vascular  Capillary refills: < 3 seconds  The right DP pulse is 2+  The right PT pulse is 2+  Left Foot/Ankle  Left Foot Inspection  Skin Exam: skin intact and callusno dry skin, no warmth, no erythema, no maceration, normal color, no pre-ulcer and no ulcer                         Toe Exam: ROM and strength within normal limits                   Sensory       Monofilament: diminished  Vascular  Capillary refills: < 3 seconds  The left DP pulse is 2+  The left PT pulse is 2+  Assign Risk Category:  No deformity present; Loss of protective sensation;  No weak pulses       Risk: 1

## 2019-07-03 ENCOUNTER — CLINICAL SUPPORT (OUTPATIENT)
Dept: NUTRITION | Facility: HOSPITAL | Age: 74
End: 2019-07-03
Payer: COMMERCIAL

## 2019-07-03 VITALS — WEIGHT: 228 LBS | HEIGHT: 69 IN | BODY MASS INDEX: 33.77 KG/M2

## 2019-07-03 DIAGNOSIS — E11.9 TYPE 2 DIABETES MELLITUS WITHOUT COMPLICATION, WITHOUT LONG-TERM CURRENT USE OF INSULIN (HCC): Primary | ICD-10-CM

## 2019-07-03 DIAGNOSIS — E66.9 OBESITY (BMI 30.0-34.9): ICD-10-CM

## 2019-07-03 DIAGNOSIS — E11.9 TYPE 2 DIABETES MELLITUS WITHOUT COMPLICATION, WITHOUT LONG-TERM CURRENT USE OF INSULIN (HCC): ICD-10-CM

## 2019-07-03 PROCEDURE — 97802 MEDICAL NUTRITION INDIV IN: CPT | Performed by: DIETITIAN, REGISTERED

## 2019-07-03 NOTE — LETTER
7/3/2019  Kira Rouse    1945     Dear Dr Lc Myers,     Thank you for referring Kira Rouse to the Outpatient Nutrition Program at [facility]  Medical Nutrition Therapy was delivered on July 3, 2019 and included individualized nutritional diagnostic therapy and counseling for the purposes of managing the following dx/disease:    Type 2 diabetes mellitus without complication, without long-term current use of insulin (HCC)     Obesity (BMI 30 0-34  9)     Nutrition Diagnosis and Intervention:  Nutrition Diagnosis:   Altered Nutrition-Related Laboratory values  related to Kidney, liver, cardiac, endocrine, neurologic, and/or pulmonary dysfunction as evidenced by  Abnormal plasma glucose and/or HgbA1c levels     Therapy Goals:  Goals:  1  Patient to check a m  Blood glucose daily and keep written journal of same  2  Patient to choose drinking water at his meals instead of iced tea daily  3   Patient to measure portion size of potatoes and other starchy foods daily at meals  Follow up planned for monitoring and evaluation: July 21, 2019    Please contact me with questions/concerns  Thank you for supporting Medical Nutrition Therapy  Lauren Bianchi [facility] CL[facility]ICAL NUTRITION [facility]  [address] [place] [address]  [phone]

## 2019-07-03 NOTE — PROGRESS NOTES
Initial Nutrition Assessment Form    Patient Name: Kira Rouse    YOB: 1945    Sex: Male     Assessment Date: 7/3/2019  Start Time: 8:15 Stop Time: 9:15 Total Minutes: 60     Data:  Present at session: self   Parent Concerns:    Medical Dx/Reason for Referral:  Type 2 diabetes mellitus without complication, without long-term current use of insulin ([facility] [address] )      Obesity (BMI 30 0-34  9)      Past Medical History:   Diagnosis Date    Arthritis of knee, right     LAST ASSESSED: 2/7/17    Diabetes mellitus ([facility] [address] )     History of gastroesophageal reflux (GERD)     LAST ASSESSED: 5/9/17    Hypercholesterolemia     LAST ASSESSED: AND RESOLVED: 1/9/18    Morbid (severe) obesity due to excess calories ([facility] [address] )     LAST ASSESSED: 5/10/16    Papular rash     RESOLVED: 2/1/16    Pes anserine bursitis     LAST ASSESSED: 11/29/16    Sebaceous cyst     LAST ASSESSED: 10/21/14    Testicular hypogonadism     LAST ASSESSED: 10/30/13    Tick bite     LAST ASSESSED: 11/14/16       Current Outpatient Medications   Medication Sig Dispense Refill    aspirin 81 MG tablet Take 81 mg by mouth once        atorvastatin (LIPITOR) 10 mg tablet Take 1 tablet (10 mg total) by mouth daily for 180 days 90 tablet 1    diphenhydrAMINE (BENADRYL ALLERGY) 25 mg capsule Take 25 mg by mouth daily at bedtime as needed for itching      glimepiride (AMARYL) 1 mg tablet Take 1 tablet (1 mg total) by mouth daily with breakfast for 90 days 90 tablet 1    glucose blood (ONE TOUCH ULTRA TEST) test strip 1 Squirt by In Vitro route daily      linaGLIPtin 5 MG TABS Take 5 mg by mouth daily for 180 days 90 tablet 1    lisinopril-hydrochlorothiazide (PRINZIDE,ZESTORETIC) 20-12 5 MG per tablet TAKE ONE TABLET BY MOUTH EVERY DAY 90 tablet 1    Omega-3 Fatty Acids (FISH OIL) 1,000 mg Take 1,000 mg by mouth daily      Triamcinolone Acetonide (NASACORT ALLERGY 24HR) 55 MCG/ACT AERO into each nostril       No current facility-administered medications for this visit  Additional Meds/Supplements: N/A   Special Learning Needs: Verbal and visual learner   Height: HC Readings from Last 3 Encounters:   No data found for [facility]       Weight: Wt Readings from Last 3 Encounters:   07/03/19 103 kg (228 lb)   07/01/19 104 kg (228 lb 3 2 oz)   03/26/19 107 kg (236 lb 3 2 oz)     Body mass index is 33 67 kg/m²  Recent Weight Change: [x]Yes     []No  Amount: 8 lb or 3 5%      Energy Needs: No calculations performed for this visit   Allergies   Allergen Reactions    Pollen Extract Allergic Rhinitis       Social History     Substance and Sexual Activity   Alcohol Use Yes    Comment: Sravanthi        Social History     Tobacco Use   Smoking Status Former Smoker   Smokeless Tobacco Never Used       Who shops? patient   Who cooks? patient   Exercise: Walks about 1/2-1 miles/day    Prior Counseling? [x]Yes     []No  When: 6 years ago   Why: diabetes        Diet Hx:  Drinks coffee, iced tea through day, of which one quarter is topped off with sweetened tea for flavor   Breakfast: 5-7 a m  Prepackaged turkey breakfast sandwich     Lunch: 11 a m   (sometimesup to 1 p m ) leftovers or turkey burger        Dinner: 4-5 p m      Frozen turkey turkey burger or chicken breast potatoes, pieroghis   Spaghetti or lasagna       Snacks: AM - sometimes   PM -   HS - veggie chips  White American Cheese w/ saltines         Nutrition Diagnosis:   Altered Nutrition-Related Laboratory values  related to Kidney, liver, cardiac, endocrine, neurologic, and/or pulmonary dysfunction as evidenced by  Abnormal plasma glucose and/or HgbA1c levels       Medical Nutrition Therapy Intervention:  [x]Individualized Meal Plan []Understanding Lab Values   []Basic Pathophysiology of Disease []Food/Medication Interactions   []Food Diary []Exercise   []Lifestyle/Behavior Modification Techniques []Medication, Mechanism of Action   [x]Label Reading [x]Self Blood Glucose Monitoring   [x]Weight/BMI Goals []Other -    Other Notes: Patient has increased hgbA1c 8 4% July 1 2019  His BMI is 33 67 obese class 1  Patient is concerned about his bg being increased and weight, is interested in recommendations for controlling glucose and weight loss  He is physically active, does outside work and housework 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  He is not checking his glucose at home  He  Is taking amaryl and tradjenta for glucose control  He was provided with sample menus for carb-controlled 1800 calorie meal plan  Also reviewed label reading and recommendations for sodium and fat intake  Handouts provided from [employer]  Comprehension: [x]Excellent  []Very Good  []Good  []Fair   []Poor    Receptivity: []Excellent  [x]Very Good  []Good  []Fair   []Poor    Expected Compliance: []Excellent  [x]Very Good  []Good  []Fair   []Poor        Goals:  1  Patient to check a m  Blood glucose daily and keep written journal of same  2  Patient to choose drinking water at his meals instead of iced tea daily  3   Patient to measure portion size of potatoes and other starchy foods daily at meals         Next follow up: July 24, 2019    Labs:  [facility]  Lab Results   Component Value Date     07/06/2015    K 4 7 03/18/2019     03/18/2019    CO2 29 03/18/2019    ANIONGAP 8 07/06/2015    BUN 17 03/18/2019    CREATININE 1 13 03/18/2019    GLUCOSE 120 07/06/2015    GLUF 134 (H) 03/18/2019    CALCIUM 9 8 03/18/2019    AST 31 03/18/2019    ALT 56 03/18/2019    ALKPHOS 78 03/18/2019    PROT 7 7 07/06/2015    BILITOT 0 8 07/06/2015    EGFR 64 03/18/2019       BMP  Lab Results   Component Value Date    GLUCOSE 120 07/06/2015    CALCIUM 9 8 03/18/2019     07/06/2015    K 4 7 03/18/2019    CO2 29 03/18/2019     03/18/2019    BUN 17 03/18/2019    CREATININE 1 13 03/18/2019       Lipids  Lab Results   Component Value Date    CHOL 146 07/06/2015     Lab Results   Component Value Date    HDL 33 (L) 03/18/2019 HDL 39 (L) 01/02/2018    HDL 37 (L) 05/01/2017     Lab Results   Component Value Date    LDLCALC 108 (H) 03/18/2019    LDLCALC 85 01/02/2018    LDLCALC 91 05/01/2017     Lab Results   Component Value Date    TRIG 146 03/18/2019    TRIG 97 01/02/2018    TRIG 99 05/01/2017     No results found for: CHOLHDL    Hemoglobin A1C  Lab Results   Component Value Date    HGBA1C 8 4 (A) 07/01/2019       Fasting Glucose  Lab Results   Component Value Date    GLUF 134 (H) 03/18/2019       Insulin     Thyroid  No results found for: TSH, F4HCHLG, L2IMLUI, THYROIDAB    Hepatic Function Panel  Lab Results   Component Value Date    ALT 56 03/18/2019    AST 31 03/18/2019    ALKPHOS 78 03/18/2019    BILITOT 0 8 07/06/2015       Celiac Disease Antibody Panel  No results found for: ENDOMYSIAL IGA, GLIADIN IGA, GLIADIN IGG, IGA, TISSUE TRANSGLUT AB, TTG IGA   Iron  No results found for: IRON, TIBC, FERRITIN    Vitamins  No results found for: VITAMIN B2   No results found for: NICOTINAMIDE, NICOTINIC ACID   No results found for: VITAMINB6  No results found for: [id number]  No results found for: VITB5  No results found for: [id number]  No results found for: THYROGLB  No results found for: VITAMIN K   No results found for: 25-HYDROXY VIT D   No components found for: ANAI Bianchi [facility] RDN  [facility] CLINICAL NUTRITION SERVICES  [facility]  [phone]

## 2019-07-24 ENCOUNTER — CLINICAL SUPPORT (OUTPATIENT)
Dept: NUTRITION | Facility: HOSPITAL | Age: 74
End: 2019-07-24
Payer: COMMERCIAL

## 2019-07-24 VITALS — WEIGHT: 217.4 LBS | HEIGHT: 69 IN | BODY MASS INDEX: 32.2 KG/M2

## 2019-07-24 DIAGNOSIS — E11.9 TYPE 2 DIABETES MELLITUS WITHOUT COMPLICATION, WITHOUT LONG-TERM CURRENT USE OF INSULIN (HCC): ICD-10-CM

## 2019-07-24 PROCEDURE — 97803 MED NUTRITION INDIV SUBSEQ: CPT

## 2019-07-24 NOTE — PROGRESS NOTES
Follow-Up Nutrition Assessment Form    Patient Name: Candis Jensen    YOB: 1945    Sex: Male      Follow Up Date: 7/24/2019  Start Time: 8:15 Stop Time: 8:45 Total Minutes: 30     Data:  Present at session: self   Parent Concerns:    Medical Dx/Reason for Referral:  Type 2 diabetes mellitus without complication, without long-term current use of insulin     Obesity (BMI 30 0-34  9)    Past Medical History:   Diagnosis Date    Arthritis of knee, right     LAST ASSESSED: 2/7/17    Diabetes mellitus ([facility] [address] )     History of gastroesophageal reflux (GERD)     LAST ASSESSED: 5/9/17    Hypercholesterolemia     LAST ASSESSED: AND RESOLVED: 1/9/18    Morbid (severe) obesity due to excess calories ([facility] [address] )     LAST ASSESSED: 5/10/16    Papular rash     RESOLVED: 2/1/16    Pes anserine bursitis     LAST ASSESSED: 11/29/16    Sebaceous cyst     LAST ASSESSED: 10/21/14    Testicular hypogonadism     LAST ASSESSED: 10/30/13    Tick bite     LAST ASSESSED: 11/14/16       Current Outpatient Medications   Medication Sig Dispense Refill    aspirin 81 MG tablet Take 81 mg by mouth once        atorvastatin (LIPITOR) 10 mg tablet Take 1 tablet (10 mg total) by mouth daily for 180 days 90 tablet 1    diphenhydrAMINE (BENADRYL ALLERGY) 25 mg capsule Take 25 mg by mouth daily at bedtime as needed for itching      glimepiride (AMARYL) 1 mg tablet Take 1 tablet (1 mg total) by mouth daily with breakfast for 90 days 90 tablet 1    glucose blood (ONE TOUCH ULTRA TEST) test strip 1 Squirt by In Vitro route daily      linaGLIPtin 5 MG TABS Take 5 mg by mouth daily for 180 days 90 tablet 1    lisinopril-hydrochlorothiazide (PRINZIDE,ZESTORETIC) 20-12 5 MG per tablet TAKE ONE TABLET BY MOUTH EVERY DAY 90 tablet 1    Omega-3 Fatty Acids (FISH OIL) 1,000 mg Take 1,000 mg by mouth daily      Triamcinolone Acetonide (NASACORT ALLERGY 24HR) 55 MCG/ACT AERO into each nostril       No current facility-administered medications for this visit  Additional Meds/Supplements: n/a   Barriers to Learning: Lack of Family Support  (patient wife has different health goals per patient)   Labs:    Height: Ht Readings from Last 3 Encounters:   07/24/19 5' 9" (1 753 m)   07/03/19 5' 9" (1 753 m)   07/01/19 5' 9" (1 753 m)      Weight: Wt Readings from Last 3 Encounters:   07/24/19 98 6 kg (217 lb 6 4 oz)   07/03/19 103 kg (228 lb)   07/01/19 104 kg (228 lb 3 2 oz)     Body mass index is 32 1 kg/m²  Wt  Change Since Last Visit: [x]Yes     []No  Amount: 11lb decrease, 5%      Energy Needs: No calculations performed for this visit   Pain Screen: Are you having pain now? No      Goals Achieved: Measuring portions of starchy foods quarter cup of potatoes/vegetables, drinking more water, checking blood sugar every morning and keeping log     New Goals:   1  Patient to include high fiber/low sugar cereal for breakfast three times per week  2  Patient to continue checking morning blood sugars and keeping a log    3          Initial PES:   Altered Nutrition-Related Laboratory values  related to Kidney, liver, cardiac, endocrine, neurologic, and/or pulmonary dysfunction as evidenced by  Abnormal plasma glucose and/or HgbA1c levels   New PES: No Change      New Problem List:  1  Blood pressure is low when working outside was dizzy, 95/58, stopped taking for five day lisinopril-hydrochlorothiazide, but started taking medication again, is going to call doctor about problem   2    3        Assessment:  Patient stated "I feel good " He stated he has been making some changes in his eating habits- modified breakfast to small wrap, egg substitute with piece of cheese,choosing a halo for snack, salad at dinner without dressing  He sometimes eats two frozen pancakes with a little margarine at breakfast to change things up  He stated he has limited intakes of ham and beef by choosing chicken fish, tuna, tilapia    He stated he is not using the air fryer and keeping protein portions to 3-4 oz per meal   He has been using sugar-free flavoring in water, (replaced his iced tea intake) and cut coffee back to one cup at breakfast  He has been clearing land, picking rocks, cutting trees down to stay active  He prefers mostly beans and mixed vegetables at meals and stated he has been watching portion sizes  He has an 11lb or 5% wt  Loss since last visit  His BMI is 32 10 obese class 1  His morning BG is recorded between  between 7/3/19 and 7/24/19  Reviewed recommendations for portion sizes of carbohydrates, offered suggestions to improve intake of whole grains  Encouraged patient to follow up with his physician about blood pressure concerns  Discussed expectations for wt  loss per week, goal wt  and benefits of improved health benefits of BG control, blood pressure       Medical Nutrition Therapy Intervention:  [x]Individualized Meal Plan []Understanding Lab Values   []Basic Pathophysiology of Disease []Food/Medication Interactions   []Food Diary []Exercise   [x]Lifestyle/Behavior Modification Techniques []Medication, Mechanism of Action   []Label Reading [x]Self Blood Glucose Monitoring   [x]Weight/BMI Goals []Other -    Other Notes:         Comprehension: [x]Excellent  []Very Good  []Good  []Fair   []Poor    Receptivity: []Excellent  [x]Very Good  []Good  []Fair   []Poor    Expected Compliance: []Excellent  [x]Very Good  []Good  []Fair   []Poor      Labs:  CMP  Lab Results   Component Value Date     07/06/2015    K 4 7 03/18/2019     03/18/2019    CO2 29 03/18/2019    ANIONGAP 8 07/06/2015    BUN 17 03/18/2019    CREATININE 1 13 03/18/2019    GLUCOSE 120 07/06/2015    GLUF 134 (H) 03/18/2019    CALCIUM 9 8 03/18/2019    AST 31 03/18/2019    ALT 56 03/18/2019    ALKPHOS 78 03/18/2019    PROT 7 7 07/06/2015    BILITOT 0 8 07/06/2015    EGFR 64 03/18/2019       BMP  Lab Results   Component Value Date    GLUCOSE 120 07/06/2015    CALCIUM 9 8 03/18/2019    NA 138 07/06/2015    K 4 7 03/18/2019    CO2 29 03/18/2019     03/18/2019    BUN 17 03/18/2019    CREATININE 1 13 03/18/2019       Lipids  Lab Results   Component Value Date    CHOL 146 07/06/2015     Lab Results   Component Value Date    HDL 33 (L) 03/18/2019    HDL 39 (L) 01/02/2018    HDL 37 (L) 05/01/2017     Lab Results   Component Value Date    LDLCALC 108 (H) 03/18/2019    LDLCALC 85 01/02/2018    LDLCALC 91 05/01/2017     Lab Results   Component Value Date    TRIG 146 03/18/2019    TRIG 97 01/02/2018    TRIG 99 05/01/2017     No results found for: CHOLHDL    Hemoglobin A1C  Lab Results   Component Value Date    HGBA1C 8 4 (A) 07/01/2019       Fasting Glucose  Lab Results   Component Value Date    GLUF 134 (H) 03/18/2019       Insulin     Thyroid  No results found for: TSH, I4MVHNR, G9YFWYG, THYROIDAB    Hepatic Function Panel  Lab Results   Component Value Date    ALT 56 03/18/2019    AST 31 03/18/2019    ALKPHOS 78 03/18/2019    BILITOT 0 8 07/06/2015       Celiac Disease Antibody Panel  No results found for: ENDOMYSIAL IGA, GLIADIN IGA, GLIADIN IGG, IGA, TISSUE TRANSGLUT AB, TTG IGA   Iron  No results found for: IRON, TIBC, FERRITIN    Vitamins  No results found for: VITAMIN B2   No results found for: NICOTINAMIDE, NICOTINIC ACID   No results found for: VITAMINB6  No results found for: [id number]  No results found for: VITB5  No results found for: S9XRKAMR  No results found for: THYROGLB  No results found for: VITAMIN K   No results found for: 25-HYDROXY VIT D   No components found for: VITAMINE     Next follow up September 25, 2019    Lauren Bianchi [facility] RDN LDN [address] CLINICAL NUTRITION SERVICES  [address] [place] [address]  [phone]

## 2019-07-24 NOTE — PATIENT INSTRUCTIONS
Call with any concerns prior to next visit  Call your primary care provider about blood pressure concerns

## 2019-07-24 NOTE — LETTER
7/24/2019  Theresa Donis    1945     Dear Dr Doug Bustillo,     Thank you for referring Theresa Donis to the Outpatient Nutrition Program at [facility]  Medical Nutrition Therapy was delivered on 7/3/2019 and included individualized nutritional diagnostic therapy and counseling for the purposes of managing the following dx/disease: Type 2 diabetes mellitus without complication without long-term current use of insulin, obesity BMI 30 0-34 9    Therapy Goals:  Goals Achieved: Measuring portions of starchy foods quarter cup of potatoes/vegetables, drinking more water, checking blood sugar every morning and keeping log     New Goals:   1  Patient to include high fiber/low sugar cereal for breakfast three times per week  2  Patient to continue checking morning blood sugars and keeping a log  3        New Problem List:  New Problem List:  1  Blood pressure is low when working outside was dizzy, 95/58, stopped taking for five day lisinopril-hydrochlorothiazide, but started taking medication again, is going to call doctor about problem   2    3        Follow up planned for monitoring and evaluation: September 25, 2019    Please contact me with questions/concerns  Thank you for supporting Medical Nutrition Therapy  Lauren Romero RDN  [facility] CLINICAL NUTRITION SERVICES  [address] [facility]  [phone]

## 2019-08-26 ENCOUNTER — APPOINTMENT (OUTPATIENT)
Dept: LAB | Facility: CLINIC | Age: 74
End: 2019-08-26
Payer: COMMERCIAL

## 2019-08-26 DIAGNOSIS — E11.9 TYPE 2 DIABETES MELLITUS WITHOUT COMPLICATION, WITHOUT LONG-TERM CURRENT USE OF INSULIN (HCC): ICD-10-CM

## 2019-08-26 LAB
CREAT UR-MCNC: 72.5 MG/DL
EST. AVERAGE GLUCOSE BLD GHB EST-MCNC: 143 MG/DL
HBA1C MFR BLD: 6.6 % (ref 4.2–6.3)
MICROALBUMIN UR-MCNC: 12.4 MG/L (ref 0–20)
MICROALBUMIN/CREAT 24H UR: 17 MG/G CREATININE (ref 0–30)

## 2019-08-26 PROCEDURE — 82570 ASSAY OF URINE CREATININE: CPT | Performed by: INTERNAL MEDICINE

## 2019-08-26 PROCEDURE — 83036 HEMOGLOBIN GLYCOSYLATED A1C: CPT

## 2019-08-26 PROCEDURE — 82043 UR ALBUMIN QUANTITATIVE: CPT | Performed by: INTERNAL MEDICINE

## 2019-08-26 PROCEDURE — 36415 COLL VENOUS BLD VENIPUNCTURE: CPT

## 2019-09-03 ENCOUNTER — OFFICE VISIT (OUTPATIENT)
Dept: INTERNAL MEDICINE CLINIC | Facility: CLINIC | Age: 74
End: 2019-09-03
Payer: COMMERCIAL

## 2019-09-03 VITALS
BODY MASS INDEX: 30.96 KG/M2 | OXYGEN SATURATION: 98 % | TEMPERATURE: 97.3 F | WEIGHT: 209 LBS | HEIGHT: 69 IN | DIASTOLIC BLOOD PRESSURE: 74 MMHG | SYSTOLIC BLOOD PRESSURE: 116 MMHG | RESPIRATION RATE: 18 BRPM | HEART RATE: 55 BPM

## 2019-09-03 DIAGNOSIS — E78.00 HYPERCHOLESTEROLEMIA: ICD-10-CM

## 2019-09-03 DIAGNOSIS — I10 ESSENTIAL HYPERTENSION: Primary | ICD-10-CM

## 2019-09-03 DIAGNOSIS — E11.9 TYPE 2 DIABETES MELLITUS WITHOUT COMPLICATION, WITHOUT LONG-TERM CURRENT USE OF INSULIN (HCC): ICD-10-CM

## 2019-09-03 PROCEDURE — 99214 OFFICE O/P EST MOD 30 MIN: CPT | Performed by: INTERNAL MEDICINE

## 2019-09-03 RX ORDER — LISINOPRIL 20 MG/1
20 TABLET ORAL DAILY
Qty: 90 TABLET | Refills: 1 | Status: SHIPPED | OUTPATIENT
Start: 2019-09-03 | End: 2019-11-27 | Stop reason: SDUPTHER

## 2019-09-03 NOTE — PROGRESS NOTES
Assessment/Plan:    Problem List Items Addressed This Visit        Endocrine    Type 2 diabetes mellitus without complication, without long-term current use of insulin ([address] )       Cardiovascular and Mediastinum    Essential hypertension - Primary    Relevant Medications    lisinopril (ZESTRIL) 20 mg tablet       Other    Hypercholesterolemia           Diagnoses and all orders for this visit:    Essential hypertension  -     lisinopril (ZESTRIL) 20 mg tablet; Take 1 tablet (20 mg total) by mouth daily for 180 days    Type 2 diabetes mellitus without complication, without long-term current use of insulin ([facility])    Hypercholesterolemia        No problem-specific Assessment & Plan notes found for this encounter  Subjective:      Patient ID: Natanael Jonas is a 69 y o  male  The patient was having low normal BP reading and low glucose readings  He states that when he was doing yard work the patient: he would become dizzy  He stopped his Linigliptin and is now using Glimepiride 1/2 of 1 mg and noted that his glucose is noted to be good  The patient states that he stopped taking his Prinzide and then started cutting it in 1/2 also  He stopped taking the Atorvastatin(?)  We discussed his intended weight loss and noted that this might be helping with all of the above and he should continue this  We will adjust the Lisiniopril/HCTZ to Lisinopril 20 mg and see how things go  The patient can continue the Glimepiride, but at the 1/2 tab  The patient states that he has no other issues         The following portions of the patient's history were reviewed and updated as appropriate:   He has a past medical history of Arthritis of knee, right, Diabetes mellitus ([address] ), History of gastroesophageal reflux (GERD), Hypercholesterolemia, Morbid (severe) obesity due to excess calories ([address] ), Papular rash, Pes anserine bursitis, Sebaceous cyst, Testicular hypogonadism, and Tick bite ,  does not have any pertinent problems on file ,   has a past surgical history that includes Wrist fusion (Bilateral) and Elbow surgery (Right)  ,  family history includes Diabetes type II in his mother; Heart disease in his mother; No Known Problems in his father; Rheumatic fever in his mother  ,   reports that he has quit smoking  He has never used smokeless tobacco  He reports that he drinks alcohol  He reports that he does not use drugs  ,  is allergic to pollen extract     Current Outpatient Medications   Medication Sig Dispense Refill    aspirin 81 MG tablet Take 81 mg by mouth once        atorvastatin (LIPITOR) 10 mg tablet Take 1 tablet (10 mg total) by mouth daily for 180 days 90 tablet 1    diphenhydrAMINE (BENADRYL ALLERGY) 25 mg capsule Take 25 mg by mouth daily at bedtime as needed for itching      glimepiride (AMARYL) 1 mg tablet Take 1 tablet (1 mg total) by mouth daily with breakfast for 90 days (Patient taking differently: Take 1 mg by mouth Take 1/2 tablet daily ) 90 tablet 1    glucose blood (ONE TOUCH ULTRA TEST) test strip 1 Squirt by In Vitro route daily      Omega-3 Fatty Acids (FISH OIL) 1,000 mg Take 1,000 mg by mouth daily      Triamcinolone Acetonide (NASACORT ALLERGY 24HR) 55 MCG/ACT AERO into each nostril      lisinopril (ZESTRIL) 20 mg tablet Take 1 tablet (20 mg total) by mouth daily for 180 days 90 tablet 1     No current facility-administered medications for this visit  Review of Systems   Constitutional: Negative for chills, fatigue and fever  HENT: Negative  Respiratory: Negative for cough, chest tightness and shortness of breath  Cardiovascular: Negative for chest pain and palpitations  Gastrointestinal: Negative for abdominal pain, constipation, diarrhea, nausea and vomiting  Genitourinary: Negative  Musculoskeletal: Negative for back pain and myalgias  Skin: Negative  Neurological: Negative  Psychiatric/Behavioral: Negative for dysphoric mood  The patient is not nervous/anxious  Objective:  Vitals:    09/03/19 0738   BP: 116/74   BP Location: Right arm   Patient Position: Sitting   Cuff Size: Large   Pulse: 55   Resp: 18   Temp: (!) 97 3 °F (36 3 °C)   SpO2: 98%   Weight: 94 8 kg (209 lb)   Height: 5' 9" (1 753 m)     Body mass index is 30 86 kg/m²  Physical Exam   Constitutional: He is oriented to person, place, and time  He appears well-developed and well-nourished  HENT:   Head: Normocephalic and atraumatic  Eyes: Pupils are equal, round, and reactive to light  EOM are normal    Neck: Normal range of motion  Neck supple  Cardiovascular: Normal rate, regular rhythm, normal heart sounds and intact distal pulses  No murmur heard  Pulmonary/Chest: Effort normal and breath sounds normal  No stridor  He has no rales  Abdominal: Soft  Bowel sounds are normal  He exhibits no distension  There is no tenderness  Musculoskeletal: Normal range of motion  He exhibits no edema or deformity  Neurological: He is alert and oriented to person, place, and time  Skin: Skin is warm and dry  Psychiatric: He has a normal mood and affect  Nursing note and vitals reviewed         PHQ-9 Depression Screening    PHQ-9:    Frequency of the following problems over the past two weeks:       Little interest or pleasure in doing things:  0 - not at all  Feeling down, depressed, or hopeless:  0 - not at all  PHQ-2 Score:  0

## 2019-09-04 ENCOUNTER — TELEPHONE (OUTPATIENT)
Dept: INTERNAL MEDICINE CLINIC | Facility: CLINIC | Age: 74
End: 2019-09-04

## 2019-09-04 NOTE — TELEPHONE ENCOUNTER
Patient stated he did only take 1/2 today ( 10 mg)  (9/4/19)    Patient did just re-check @ 9:10 am    BP- 104/71  P- 84    Blood sugar- 145

## 2019-09-04 NOTE — TELEPHONE ENCOUNTER
Patient called in stating    BP 61/41  Pulse 103    Patient stated he is sweating as well he is sitting down

## 2019-09-04 NOTE — TELEPHONE ENCOUNTER
Hold Lisinopril today (if he has not already taken it)  Start lisinopril 1/2 20 mg (10 mg) tomorrow

## 2019-09-18 ENCOUNTER — OFFICE VISIT (OUTPATIENT)
Dept: INTERNAL MEDICINE CLINIC | Facility: CLINIC | Age: 74
End: 2019-09-18
Payer: COMMERCIAL

## 2019-09-18 VITALS
WEIGHT: 208.25 LBS | HEART RATE: 55 BPM | DIASTOLIC BLOOD PRESSURE: 74 MMHG | OXYGEN SATURATION: 99 % | HEIGHT: 69 IN | TEMPERATURE: 96.9 F | BODY MASS INDEX: 30.84 KG/M2 | SYSTOLIC BLOOD PRESSURE: 122 MMHG

## 2019-09-18 DIAGNOSIS — E11.9 TYPE 2 DIABETES MELLITUS WITHOUT COMPLICATION, WITHOUT LONG-TERM CURRENT USE OF INSULIN (HCC): Primary | ICD-10-CM

## 2019-09-18 DIAGNOSIS — I10 ESSENTIAL HYPERTENSION: ICD-10-CM

## 2019-09-18 DIAGNOSIS — E78.00 HYPERCHOLESTEROLEMIA: ICD-10-CM

## 2019-09-18 LAB — SL AMB POCT HEMOGLOBIN AIC: 5.6 (ref ?–6.5)

## 2019-09-18 PROCEDURE — 3078F DIAST BP <80 MM HG: CPT | Performed by: INTERNAL MEDICINE

## 2019-09-18 PROCEDURE — 83036 HEMOGLOBIN GLYCOSYLATED A1C: CPT | Performed by: INTERNAL MEDICINE

## 2019-09-18 PROCEDURE — 99213 OFFICE O/P EST LOW 20 MIN: CPT | Performed by: INTERNAL MEDICINE

## 2019-09-18 PROCEDURE — 3008F BODY MASS INDEX DOCD: CPT | Performed by: INTERNAL MEDICINE

## 2019-09-18 PROCEDURE — 3074F SYST BP LT 130 MM HG: CPT | Performed by: INTERNAL MEDICINE

## 2019-09-18 PROCEDURE — 3044F HG A1C LEVEL LT 7.0%: CPT | Performed by: INTERNAL MEDICINE

## 2019-09-18 NOTE — PROGRESS NOTES
Assessment/Plan:    Problem List Items Addressed This Visit        Endocrine    Type 2 diabetes mellitus without complication, without long-term current use of insulin ([address] ) - Primary    Relevant Orders    POCT hemoglobin A1c (Completed)       Cardiovascular and Mediastinum    Essential hypertension       Other    Hypercholesterolemia           Diagnoses and all orders for this visit:    Type 2 diabetes mellitus without complication, without long-term current use of insulin ([facility])  -     POCT hemoglobin A1c    Essential hypertension    Hypercholesterolemia        No problem-specific Assessment & Plan notes found for this encounter  Subjective:      Patient ID: Gypsy Clarke is a 69 y o  male  The patient was seen and examined and noted to have no issues with the change in medications  His hgbA1c was noted to be 5 6%  The patient's glucometer readings were reviewed and noted to be reasonable  His BP was noted to be good also  He has had no issue with hypotension  The patient states has lost additional weight  The following portions of the patient's history were reviewed and updated as appropriate:   He has a past medical history of Arthritis of knee, right, History of gastroesophageal reflux (GERD), Hypercholesterolemia, Morbid (severe) obesity due to excess calories ([address] ), Papular rash, Pes anserine bursitis, Sebaceous cyst, Testicular hypogonadism, and Tick bite ,  does not have any pertinent problems on file  ,   has a past surgical history that includes Wrist fusion (Bilateral) and Elbow surgery (Right)  ,  family history includes Diabetes type II in his mother; Heart disease in his mother; No Known Problems in his father; Rheumatic fever in his mother  ,   reports that he has quit smoking  He has never used smokeless tobacco  He reports that he drinks alcohol  He reports that he does not use drugs  ,  is allergic to pollen extract     Current Outpatient Medications   Medication Sig Dispense Refill    aspirin 81 MG tablet Take 81 mg by mouth once        atorvastatin (LIPITOR) 10 mg tablet Take 1 tablet (10 mg total) by mouth daily for 180 days 90 tablet 1    diphenhydrAMINE (BENADRYL ALLERGY) 25 mg capsule Take 25 mg by mouth daily at bedtime as needed for itching      glucose blood (ONE TOUCH ULTRA TEST) test strip 1 Squirt by In Vitro route daily      lisinopril (ZESTRIL) 20 mg tablet Take 1 tablet (20 mg total) by mouth daily for 180 days 90 tablet 1    Omega-3 Fatty Acids (FISH OIL) 1,000 mg Take 1,000 mg by mouth daily      Triamcinolone Acetonide (NASACORT ALLERGY 24HR) 55 MCG/ACT AERO into each nostril       No current facility-administered medications for this visit  Review of Systems   Constitutional: Negative for chills, fatigue and fever  HENT: Negative  Respiratory: Negative for cough, chest tightness and shortness of breath  Cardiovascular: Negative for chest pain and palpitations  Gastrointestinal: Negative for abdominal pain, constipation, diarrhea, nausea and vomiting  Genitourinary: Negative  Musculoskeletal: Negative for back pain and myalgias  Skin: Negative  Neurological: Negative  Psychiatric/Behavioral: Negative for dysphoric mood  The patient is not nervous/anxious  Objective:  Vitals:    09/18/19 1246   BP: 122/74   Pulse: 55   Temp: (!) 96 9 °F (36 1 °C)   SpO2: 99%   Weight: 94 5 kg (208 lb 4 oz)   Height: 5' 9" (1 753 m)     Body mass index is 30 75 kg/m²  Physical Exam   Constitutional: He is oriented to person, place, and time  He appears well-developed and well-nourished  HENT:   Head: Normocephalic and atraumatic  Eyes: Pupils are equal, round, and reactive to light  EOM are normal    Neck: Normal range of motion  Neck supple  Cardiovascular: Normal rate, regular rhythm, normal heart sounds and intact distal pulses  No murmur heard  Pulmonary/Chest: Effort normal and breath sounds normal  No stridor   He has no rales    Abdominal: Soft  Bowel sounds are normal  He exhibits no distension  There is no tenderness  Musculoskeletal: Normal range of motion  He exhibits no edema or deformity  Neurological: He is alert and oriented to person, place, and time  Skin: Skin is warm and dry  Psychiatric: He has a normal mood and affect  Nursing note and vitals reviewed         PHQ-9 Depression Screening    PHQ-9:    Frequency of the following problems over the past two weeks:

## 2019-11-27 DIAGNOSIS — I10 ESSENTIAL HYPERTENSION: ICD-10-CM

## 2019-11-29 PROCEDURE — 4010F ACE/ARB THERAPY RXD/TAKEN: CPT | Performed by: INTERNAL MEDICINE

## 2019-11-29 RX ORDER — LISINOPRIL 20 MG/1
TABLET ORAL
Qty: 90 TABLET | Refills: 1 | Status: SHIPPED | OUTPATIENT
Start: 2019-11-29 | End: 2020-07-14

## 2019-12-13 DIAGNOSIS — E78.00 HYPERCHOLESTEROLEMIA: ICD-10-CM

## 2019-12-13 RX ORDER — ATORVASTATIN CALCIUM 10 MG/1
TABLET, FILM COATED ORAL
Qty: 90 TABLET | Refills: 1 | Status: SHIPPED | OUTPATIENT
Start: 2019-12-13 | End: 2020-03-18

## 2020-03-18 ENCOUNTER — OFFICE VISIT (OUTPATIENT)
Dept: INTERNAL MEDICINE CLINIC | Facility: CLINIC | Age: 75
End: 2020-03-18
Payer: COMMERCIAL

## 2020-03-18 VITALS
BODY MASS INDEX: 33.44 KG/M2 | WEIGHT: 225.8 LBS | SYSTOLIC BLOOD PRESSURE: 140 MMHG | TEMPERATURE: 98 F | HEIGHT: 69 IN | HEART RATE: 65 BPM | RESPIRATION RATE: 16 BRPM | DIASTOLIC BLOOD PRESSURE: 78 MMHG | OXYGEN SATURATION: 97 %

## 2020-03-18 DIAGNOSIS — Z01.00 DIABETIC EYE EXAM (HCC): ICD-10-CM

## 2020-03-18 DIAGNOSIS — Z00.00 MEDICARE ANNUAL WELLNESS VISIT, SUBSEQUENT: Primary | ICD-10-CM

## 2020-03-18 DIAGNOSIS — E78.00 HYPERCHOLESTEROLEMIA: ICD-10-CM

## 2020-03-18 DIAGNOSIS — E66.9 OBESITY (BMI 30.0-34.9): ICD-10-CM

## 2020-03-18 DIAGNOSIS — E11.9 TYPE 2 DIABETES MELLITUS WITHOUT COMPLICATION, WITHOUT LONG-TERM CURRENT USE OF INSULIN (HCC): ICD-10-CM

## 2020-03-18 DIAGNOSIS — E11.8 TYPE 2 DIABETES MELLITUS WITH COMPLICATION, WITHOUT LONG-TERM CURRENT USE OF INSULIN (HCC): ICD-10-CM

## 2020-03-18 DIAGNOSIS — K21.9 GASTROESOPHAGEAL REFLUX DISEASE WITHOUT ESOPHAGITIS: ICD-10-CM

## 2020-03-18 DIAGNOSIS — I10 ESSENTIAL HYPERTENSION: ICD-10-CM

## 2020-03-18 DIAGNOSIS — E11.9 DIABETIC EYE EXAM (HCC): ICD-10-CM

## 2020-03-18 LAB — SL AMB POCT HEMOGLOBIN AIC: 7.1 (ref ?–6.5)

## 2020-03-18 PROCEDURE — 3078F DIAST BP <80 MM HG: CPT | Performed by: INTERNAL MEDICINE

## 2020-03-18 PROCEDURE — 1160F RVW MEDS BY RX/DR IN RCRD: CPT | Performed by: INTERNAL MEDICINE

## 2020-03-18 PROCEDURE — 4040F PNEUMOC VAC/ADMIN/RCVD: CPT | Performed by: INTERNAL MEDICINE

## 2020-03-18 PROCEDURE — 3077F SYST BP >= 140 MM HG: CPT | Performed by: INTERNAL MEDICINE

## 2020-03-18 PROCEDURE — 99214 OFFICE O/P EST MOD 30 MIN: CPT | Performed by: INTERNAL MEDICINE

## 2020-03-18 PROCEDURE — 3008F BODY MASS INDEX DOCD: CPT | Performed by: INTERNAL MEDICINE

## 2020-03-18 PROCEDURE — 83036 HEMOGLOBIN GLYCOSYLATED A1C: CPT | Performed by: INTERNAL MEDICINE

## 2020-03-18 PROCEDURE — 1125F AMNT PAIN NOTED PAIN PRSNT: CPT | Performed by: INTERNAL MEDICINE

## 2020-03-18 PROCEDURE — 3051F HG A1C>EQUAL 7.0%<8.0%: CPT | Performed by: INTERNAL MEDICINE

## 2020-03-18 PROCEDURE — G0439 PPPS, SUBSEQ VISIT: HCPCS | Performed by: INTERNAL MEDICINE

## 2020-03-18 PROCEDURE — 1170F FXNL STATUS ASSESSED: CPT | Performed by: INTERNAL MEDICINE

## 2020-03-18 PROCEDURE — 1036F TOBACCO NON-USER: CPT | Performed by: INTERNAL MEDICINE

## 2020-03-18 RX ORDER — GLIMEPIRIDE 2 MG/1
2 TABLET ORAL
Qty: 90 TABLET | Refills: 1
Start: 2020-03-18 | End: 2020-07-20 | Stop reason: SDUPTHER

## 2020-03-18 RX ORDER — PRAVASTATIN SODIUM 10 MG
10 TABLET ORAL
Qty: 30 TABLET | Refills: 5 | Status: SHIPPED | OUTPATIENT
Start: 2020-03-18 | End: 2020-08-03

## 2020-03-18 RX ORDER — CHLORAL HYDRATE 500 MG
CAPSULE ORAL DAILY
COMMUNITY
End: 2020-03-18

## 2020-03-18 RX ORDER — LANOLIN ALCOHOL/MO/W.PET/CERES
1 CREAM (GRAM) TOPICAL DAILY
COMMUNITY
End: 2021-01-05

## 2020-03-18 NOTE — PROGRESS NOTES
Diabetic Foot Exam    Patient's shoes and socks removed  Right Foot/Ankle   Right Foot Inspection  Skin Exam: skin normal, skin intact, callus and callus no dry skin, no warmth, no erythema, no maceration, no abnormal color, no pre-ulcer and no ulcer                          Toe Exam: ROM and strength within normal limits no right toe deformity  Sensory       Monofilament testing: intact  Vascular  Capillary refills: < 3 seconds  The right DP pulse is 2+  The right PT pulse is 2+  Left Foot/Ankle  Left Foot Inspection  Skin Exam: skin normal, skin intact and callusno dry skin, no warmth, no erythema, no maceration, normal color, no pre-ulcer and no ulcer                         Toe Exam: ROM and strength within normal limitsno left toe deformity                   Sensory       Monofilament: intact  Vascular  Capillary refills: < 3 seconds  The left DP pulse is 2+  The left PT pulse is 2+  Assign Risk Category:  No deformity present; No loss of protective sensation;  No weak pulses       Risk: 0

## 2020-03-18 NOTE — PATIENT INSTRUCTIONS

## 2020-03-18 NOTE — PROGRESS NOTES
Assessment/Plan:    Problem List Items Addressed This Visit        Digestive    Gastroesophageal reflux disease without esophagitis       Endocrine    Type 2 diabetes mellitus with complication, without long-term current use of insulin ([facility])    Relevant Medications    glimepiride (AMARYL) 2 mg tablet       Cardiovascular and Mediastinum    Essential hypertension       Other    Obesity (BMI 30 0-34 9)      Other Visit Diagnoses     Diabetic eye exam ([place] [address] )    -  Primary    Relevant Orders    Ambulatory referral to Ophthalmology           Diagnoses and all orders for this visit:    Diabetic eye exam ([place] [address] )  -     Ambulatory referral to Ophthalmology    Type 2 diabetes mellitus without complication, without long-term current use of insulin ([place] [address] )  -     POCT hemoglobin A1c  -     glimepiride (AMARYL) 2 mg tablet; Take 1 tablet (2 mg total) by mouth daily with breakfast  -     Comprehensive metabolic panel; Future  -     Lipid Panel with Direct LDL reflex; Future  -     Microalbumin / creatinine urine ratio  -     CBC and differential; Future  -     HEMOGLOBIN A1C W/ EAG ESTIMATION; Future    Type 2 diabetes mellitus with complication, without long-term current use of insulin ([facility])    Essential hypertension    Obesity (BMI 30 0-34  9)    Gastroesophageal reflux disease without esophagitis    Other orders  -     Discontinue: Omega-3 Fatty Acids (FISH OIL) 1,000 mg; Take by mouth daily  -     glucosamine-chondroitin 500-400 MG tablet; Take 1 tablet by mouth daily        No problem-specific Assessment & Plan notes found for this encounter  The patient was seen and examined and noted to have issues with     Subjective:      Patient ID: Slava Hunter is a 77 y o  male  The patient is doing well with her GERD symptoms  Diabetes   He presents for his follow-up diabetic visit  He has type 2 diabetes mellitus  His disease course has been stable   Pertinent negatives for hypoglycemia include no confusion, dizziness, headaches, hunger, mood changes, nervousness/anxiousness, pallor, seizures, sleepiness, speech difficulty, sweats or tremors  Pertinent negatives for diabetes include no blurred vision, no chest pain, no fatigue, no foot paresthesias, no foot ulcerations, no polydipsia, no polyphagia, no polyuria, no visual change, no weakness and no weight loss  Pertinent negatives for hypoglycemia complications include no blackouts, no hospitalization, no nocturnal hypoglycemia, no required assistance and no required glucagon injection  Symptoms are resolved  Pertinent negatives for diabetic complications include no autonomic neuropathy, CVA, heart disease, impotence, nephropathy, peripheral neuropathy, PVD or retinopathy  Risk factors for coronary artery disease include obesity, hypertension, dyslipidemia and diabetes mellitus  Current diabetic treatment includes oral agent (monotherapy)  He is compliant with treatment all of the time  His weight is stable  He has not had a previous visit with a dietitian  There is no change in his home blood glucose trend  An ACE inhibitor/angiotensin II receptor blocker is being taken  He does not see a podiatrist Eye exam is not current  Hypertension   This is a chronic problem  The current episode started more than 1 year ago  The problem has been waxing and waning since onset  The problem is controlled  Pertinent negatives include no blurred vision, chest pain, headaches, palpitations, shortness of breath or sweats  Risk factors for coronary artery disease include diabetes mellitus  Past treatments include ACE inhibitors  The current treatment provides significant improvement  There are no compliance problems  There is no history of angina, kidney disease, CAD/MI, CVA, heart failure, left ventricular hypertrophy, PVD or retinopathy         The following portions of the patient's history were reviewed and updated as appropriate:   He has a past medical history of Arthritis of knee, right, History of gastroesophageal reflux (GERD), Hypercholesterolemia, Morbid (severe) obesity due to excess calories (HCC), Papular rash, Pes anserine bursitis, Sebaceous cyst, Testicular hypogonadism, and Tick bite ,  does not have any pertinent problems on file  ,   has a past surgical history that includes Wrist fusion (Bilateral) and Elbow surgery (Right)  ,  family history includes Diabetes type II in his mother; Heart disease in his mother; No Known Problems in his father; Rheumatic fever in his mother  ,   reports that he has quit smoking  He has never used smokeless tobacco  He reports that he drinks alcohol  He reports that he does not use drugs  ,  is allergic to pollen extract     Current Outpatient Medications   Medication Sig Dispense Refill    aspirin 81 MG tablet Take 81 mg by mouth once        diphenhydrAMINE (BENADRYL ALLERGY) 25 mg capsule Take 25 mg by mouth daily at bedtime as needed for itching      glucosamine-chondroitin 500-400 MG tablet Take 1 tablet by mouth daily      glucose blood (ONE TOUCH ULTRA TEST) test strip 1 Squirt by In Vitro route daily      lisinopril (ZESTRIL) 20 mg tablet TAKE ONE TABLET BY MOUTH EVERY DAY 90 tablet 1    Omega-3 Fatty Acids (FISH OIL) 1,000 mg Take 1,000 mg by mouth daily      Triamcinolone Acetonide (NASACORT ALLERGY 24HR) 55 MCG/ACT AERO into each nostril      glimepiride (AMARYL) 2 mg tablet Take 1 tablet (2 mg total) by mouth daily with breakfast 90 tablet 1     No current facility-administered medications for this visit  Review of Systems   Constitutional: Negative for chills, fatigue, fever and weight loss  HENT: Negative  Eyes: Negative for blurred vision  Respiratory: Negative for cough, chest tightness and shortness of breath  Cardiovascular: Negative for chest pain and palpitations  Gastrointestinal: Negative for abdominal pain, constipation, diarrhea, nausea and vomiting     Endocrine: Negative for polydipsia, polyphagia and polyuria  Genitourinary: Negative  Negative for impotence  Musculoskeletal: Negative for back pain and myalgias  Skin: Negative  Negative for pallor  Neurological: Negative  Negative for dizziness, tremors, seizures, speech difficulty, weakness and headaches  Psychiatric/Behavioral: Negative for confusion and dysphoric mood  The patient is not nervous/anxious  Objective:  Vitals:    03/18/20 0856   BP: 140/78   BP Location: Right arm   Patient Position: Sitting   Cuff Size: Large   Pulse: 65   Resp: 16   Temp: 98 °F (36 7 °C)   SpO2: 97%   Weight: 102 kg (225 lb 12 8 oz)   Height: 5' 9" (1 753 m)     Body mass index is 33 34 kg/m²  Physical Exam   Constitutional: He is oriented to person, place, and time  He appears well-developed and well-nourished  HENT:   Head: Normocephalic and atraumatic  Eyes: Pupils are equal, round, and reactive to light  EOM are normal    Neck: Normal range of motion  Neck supple  Cardiovascular: Normal rate, regular rhythm, normal heart sounds and intact distal pulses  No murmur heard  Pulmonary/Chest: Effort normal and breath sounds normal  No stridor  He has no rales  Abdominal: Soft  Bowel sounds are normal  He exhibits no distension  There is no tenderness  Musculoskeletal: Normal range of motion  He exhibits no edema or deformity  Neurological: He is alert and oriented to person, place, and time  Skin: Skin is warm and dry  Psychiatric: He has a normal mood and affect  Nursing note and vitals reviewed         PHQ-9 Depression Screening    PHQ-9:    Frequency of the following problems over the past two weeks:       Little interest or pleasure in doing things:  0 - not at all  Feeling down, depressed, or hopeless:  0 - not at all  PHQ-2 Score:  0

## 2020-03-18 NOTE — PROGRESS NOTES
Assessment and Plan:     Problem List Items Addressed This Visit        Endocrine    Type 2 diabetes mellitus without complication, without long-term current use of insulin ([address] )      Other Visit Diagnoses     Diabetic eye exam ([address] )    -  Primary        BMI Counseling: Body mass index is 33 34 kg/m²  The BMI is above normal  Exercise recommendations include exercising 3-5 times per week  No pharmacotherapy was ordered  Preventive health issues were discussed with patient, and age appropriate screening tests were ordered as noted in patient's After Visit Summary  Personalized health advice and appropriate referrals for health education or preventive services given if needed, as noted in patient's After Visit Summary  History of Present Illness:     Patient presents for Medicare Annual Wellness visit    Patient Care Team:  Jewels De Jesus DO as PCP - General (Internal Medicine)  Jewels De Jesus DO as PCP - [address] [place] [address] (E)     Problem List:     Patient Active Problem List   Diagnosis    Type 2 diabetes mellitus without complication, without long-term current use of insulin ([address] )    Essential hypertension    Allergic rhinitis    Arthritis of knee, right    Enlarged prostate with lower urinary tract symptoms (LUTS)    Gastroesophageal reflux disease without esophagitis    Fungal dermatosis    Hypercholesterolemia    Obesity (BMI 30 0-34  9)    Peyronie's disease    Encounter for hepatitis C screening test for low risk patient    Polycythemia      Past Medical and Surgical History:     Past Medical History:   Diagnosis Date    Arthritis of knee, right     LAST ASSESSED: 2/7/17    History of gastroesophageal reflux (GERD)     LAST ASSESSED: 5/9/17    Hypercholesterolemia     LAST ASSESSED: AND RESOLVED: 1/9/18    Morbid (severe) obesity due to excess calories ([address] )     LAST ASSESSED: 5/10/16    Papular rash     RESOLVED: 2/1/16    Pes anserine bursitis     LAST ASSESSED: 11/29/16  Sebaceous cyst     LAST ASSESSED: 10/21/14    Testicular hypogonadism     LAST ASSESSED: 10/30/13    Tick bite     LAST ASSESSED: 11/14/16     Past Surgical History:   Procedure Laterality Date    ELBOW SURGERY Right     WRIST FUSION Bilateral       Family History:     Family History   Problem Relation Age of Onset    Diabetes type II Mother         MELLITUS    Rheumatic fever Mother     Heart disease Mother     No Known Problems Father       Social History:     E-Cigarette/Vaping    E-Cigarette Use Never User      E-Cigarette/Vaping Substances    Nicotine No     THC No     CBD No     Flavoring No     Other No     Unknown No      Social History     Socioeconomic History    Marital status: /Civil Union     Spouse name: None    Number of children: None    Years of education: None    Highest education level: None   Occupational History    Occupation: RETIRED   Social Needs    Financial resource strain: None    Food insecurity:     Worry: None     Inability: None    Transportation needs:     Medical: None     Non-medical: None   Tobacco Use    Smoking status: Former Smoker    Smokeless tobacco: Never Used   Substance and Sexual Activity    Alcohol use: Yes     Comment: Sravanthi     Drug use: No    Sexual activity: None   Lifestyle    Physical activity:     Days per week: None     Minutes per session: None    Stress: None   Relationships    Social connections:     Talks on phone: None     Gets together: None     Attends Denominational service: None     Active member of club or organization: None     Attends meetings of clubs or organizations: None     Relationship status: None    Intimate partner violence:     Fear of current or ex partner: None     Emotionally abused: None     Physically abused: None     Forced sexual activity: None   Other Topics Concern    None   Social History Narrative    Retired        Lives with spouse       Medications and Allergies:     Current Outpatient Medications   Medication Sig Dispense Refill    aspirin 81 MG tablet Take 81 mg by mouth once        diphenhydrAMINE (BENADRYL ALLERGY) 25 mg capsule Take 25 mg by mouth daily at bedtime as needed for itching      glucosamine-chondroitin 500-400 MG tablet Take 1 tablet by mouth daily      glucose blood (ONE TOUCH ULTRA TEST) test strip 1 Squirt by In Vitro route daily      lisinopril (ZESTRIL) 20 mg tablet TAKE ONE TABLET BY MOUTH EVERY DAY 90 tablet 1    Omega-3 Fatty Acids (FISH OIL) 1,000 mg Take 1,000 mg by mouth daily      Triamcinolone Acetonide (NASACORT ALLERGY 24HR) 55 MCG/ACT AERO into each nostril       No current facility-administered medications for this visit  Allergies   Allergen Reactions    Pollen Extract Allergic Rhinitis      Immunizations:     Immunization History   Administered Date(s) Administered     Influenza (IM) Preservative Free 10/01/2014    INFLUENZA 1945, 10/01/2014, 10/01/2017, 10/01/2018    Influenza Split High Dose Preservative Free IM 10/03/2015    Influenza TIV (IM) 10/01/2017    Influenza, high dose seasonal 0 5 mL 10/02/2019    Pneumococcal Polysaccharide PPV23 01/15/2013    Tdap 01/21/2015    Zoster 01/15/2013      Health Maintenance:         Topic Date Due    CRC Screening: Colonoscopy  01/01/2023    Hepatitis C Screening  Completed         Topic Date Due    Pneumococcal Vaccine: 65+ Years (2 of 2 - PCV13) 01/15/2014      Medicare Health Risk Assessment:     /78 (BP Location: Right arm, Patient Position: Sitting, Cuff Size: Large)   Pulse 65   Temp 98 °F (36 7 °C)   Resp 16   Ht 5' 9" (1 753 m)   Wt 102 kg (225 lb 12 8 oz)   SpO2 97%   BMI 33 34 kg/m²      Chrissie Najjar is here for his Subsequent Wellness visit  Health Risk Assessment:   Patient rates overall health as very good  Patient feels that their physical health rating is same  Eyesight was rated as same  Hearing was rated as same   Patient feels that their emotional and mental health rating is same  Pain experienced in the last 7 days has been none  Patient states that he has experienced no weight loss or gain in last 6 months  Depression Screening:   PHQ-2 Score: 0      Fall Risk Screening: In the past year, patient has experienced: no history of falling in past year      Home Safety:  Patient does not have trouble with stairs inside or outside of their home  Patient has working smoke alarms and has working carbon monoxide detector  Home safety hazards include: none  Nutrition:   Current diet is Regular  Medications:   Patient is currently taking over-the-counter supplements  OTC medications include: see medication list  Patient is able to manage medications  Activities of Daily Living (ADLs)/Instrumental Activities of Daily Living (IADLs):   Walk and transfer into and out of bed and chair?: Yes  Dress and groom yourself?: Yes    Bathe or shower yourself?: Yes    Feed yourself? Yes  Do your laundry/housekeeping?: Yes  Manage your money, pay your bills and track your expenses?: Yes  Make your own meals?: Yes    Do your own shopping?: Yes    Previous Hospitalizations:   Any hospitalizations or ED visits within the last 12 months?: No      Advance Care Planning:   Living will: Yes    Durable POA for healthcare:  Yes    Advanced directive: Yes    Advanced directive counseling given: No    Five wishes given: No    Patient declined ACP directive: No    End of Life Decisions reviewed with patient: Yes    Provider agrees with end of life decisions: Yes      Cognitive Screening:   Provider or family/friend/caregiver concerned regarding cognition?: No    PREVENTIVE SCREENINGS      Cardiovascular Screening:    General: Screening Not Indicated and History Lipid Disorder      Diabetes Screening:     General: Screening Not Indicated and History Diabetes      Colorectal Cancer Screening:     General: Screening Current      Prostate Cancer Screening:    General: Screening Current      Osteoporosis Screening:    General: Screening Not Indicated      Abdominal Aortic Aneurysm (AAA) Screening:    Risk factors include: age between 73-67 yo and tobacco use        Lung Cancer Screening:     General: Screening Not Indicated      Hepatitis C Screening:    General: Screening Current      Saurav Cornell DO

## 2020-07-13 DIAGNOSIS — I10 ESSENTIAL HYPERTENSION: ICD-10-CM

## 2020-07-14 ENCOUNTER — APPOINTMENT (OUTPATIENT)
Dept: LAB | Facility: CLINIC | Age: 75
End: 2020-07-14
Payer: COMMERCIAL

## 2020-07-14 ENCOUNTER — TRANSCRIBE ORDERS (OUTPATIENT)
Dept: LAB | Facility: CLINIC | Age: 75
End: 2020-07-14

## 2020-07-14 DIAGNOSIS — E11.9 TYPE 2 DIABETES MELLITUS WITHOUT COMPLICATION, WITHOUT LONG-TERM CURRENT USE OF INSULIN (HCC): ICD-10-CM

## 2020-07-14 LAB
ALBUMIN SERPL BCP-MCNC: 3.7 G/DL (ref 3.5–5)
ALP SERPL-CCNC: 79 U/L (ref 46–116)
ALT SERPL W P-5'-P-CCNC: 55 U/L (ref 12–78)
ANION GAP SERPL CALCULATED.3IONS-SCNC: 5 MMOL/L (ref 4–13)
AST SERPL W P-5'-P-CCNC: 31 U/L (ref 5–45)
BASOPHILS # BLD AUTO: 0.06 THOUSANDS/ΜL (ref 0–0.1)
BASOPHILS NFR BLD AUTO: 1 % (ref 0–1)
BILIRUB SERPL-MCNC: 0.8 MG/DL (ref 0.2–1)
BUN SERPL-MCNC: 18 MG/DL (ref 5–25)
CALCIUM SERPL-MCNC: 9.6 MG/DL (ref 8.3–10.1)
CHLORIDE SERPL-SCNC: 105 MMOL/L (ref 100–108)
CHOLEST SERPL-MCNC: 148 MG/DL (ref 50–200)
CO2 SERPL-SCNC: 27 MMOL/L (ref 21–32)
CREAT SERPL-MCNC: 1.06 MG/DL (ref 0.6–1.3)
CREAT UR-MCNC: 141 MG/DL
EOSINOPHIL # BLD AUTO: 0.4 THOUSAND/ΜL (ref 0–0.61)
EOSINOPHIL NFR BLD AUTO: 6 % (ref 0–6)
ERYTHROCYTE [DISTWIDTH] IN BLOOD BY AUTOMATED COUNT: 12.6 % (ref 11.6–15.1)
EST. AVERAGE GLUCOSE BLD GHB EST-MCNC: 169 MG/DL
GFR SERPL CREATININE-BSD FRML MDRD: 69 ML/MIN/1.73SQ M
GLUCOSE P FAST SERPL-MCNC: 172 MG/DL (ref 65–99)
HBA1C MFR BLD: 7.5 %
HCT VFR BLD AUTO: 48.3 % (ref 36.5–49.3)
HDLC SERPL-MCNC: 33 MG/DL
HGB BLD-MCNC: 15.7 G/DL (ref 12–17)
IMM GRANULOCYTES # BLD AUTO: 0.01 THOUSAND/UL (ref 0–0.2)
IMM GRANULOCYTES NFR BLD AUTO: 0 % (ref 0–2)
LDLC SERPL CALC-MCNC: 96 MG/DL (ref 0–100)
LYMPHOCYTES # BLD AUTO: 1.99 THOUSANDS/ΜL (ref 0.6–4.47)
LYMPHOCYTES NFR BLD AUTO: 32 % (ref 14–44)
MCH RBC QN AUTO: 29.7 PG (ref 26.8–34.3)
MCHC RBC AUTO-ENTMCNC: 32.5 G/DL (ref 31.4–37.4)
MCV RBC AUTO: 91 FL (ref 82–98)
MICROALBUMIN UR-MCNC: 86.8 MG/L (ref 0–20)
MICROALBUMIN/CREAT 24H UR: 62 MG/G CREATININE (ref 0–30)
MONOCYTES # BLD AUTO: 0.56 THOUSAND/ΜL (ref 0.17–1.22)
MONOCYTES NFR BLD AUTO: 9 % (ref 4–12)
NEUTROPHILS # BLD AUTO: 3.22 THOUSANDS/ΜL (ref 1.85–7.62)
NEUTS SEG NFR BLD AUTO: 52 % (ref 43–75)
NRBC BLD AUTO-RTO: 0 /100 WBCS
PLATELET # BLD AUTO: 263 THOUSANDS/UL (ref 149–390)
PMV BLD AUTO: 11.6 FL (ref 8.9–12.7)
POTASSIUM SERPL-SCNC: 4.6 MMOL/L (ref 3.5–5.3)
PROT SERPL-MCNC: 8.1 G/DL (ref 6.4–8.2)
RBC # BLD AUTO: 5.29 MILLION/UL (ref 3.88–5.62)
SODIUM SERPL-SCNC: 137 MMOL/L (ref 136–145)
TRIGL SERPL-MCNC: 97 MG/DL
WBC # BLD AUTO: 6.24 THOUSAND/UL (ref 4.31–10.16)

## 2020-07-14 PROCEDURE — 36415 COLL VENOUS BLD VENIPUNCTURE: CPT

## 2020-07-14 PROCEDURE — 85025 COMPLETE CBC W/AUTO DIFF WBC: CPT

## 2020-07-14 PROCEDURE — 3060F POS MICROALBUMINURIA REV: CPT | Performed by: INTERNAL MEDICINE

## 2020-07-14 PROCEDURE — 4010F ACE/ARB THERAPY RXD/TAKEN: CPT | Performed by: INTERNAL MEDICINE

## 2020-07-14 PROCEDURE — 82570 ASSAY OF URINE CREATININE: CPT | Performed by: INTERNAL MEDICINE

## 2020-07-14 PROCEDURE — 82043 UR ALBUMIN QUANTITATIVE: CPT | Performed by: INTERNAL MEDICINE

## 2020-07-14 PROCEDURE — 3051F HG A1C>EQUAL 7.0%<8.0%: CPT | Performed by: INTERNAL MEDICINE

## 2020-07-14 PROCEDURE — 80053 COMPREHEN METABOLIC PANEL: CPT

## 2020-07-14 PROCEDURE — 80061 LIPID PANEL: CPT

## 2020-07-14 PROCEDURE — 83036 HEMOGLOBIN GLYCOSYLATED A1C: CPT

## 2020-07-14 RX ORDER — LISINOPRIL 20 MG/1
TABLET ORAL
Qty: 90 TABLET | Refills: 1 | Status: SHIPPED | OUTPATIENT
Start: 2020-07-14 | End: 2021-01-07

## 2020-07-20 ENCOUNTER — OFFICE VISIT (OUTPATIENT)
Dept: INTERNAL MEDICINE CLINIC | Facility: CLINIC | Age: 75
End: 2020-07-20
Payer: COMMERCIAL

## 2020-07-20 VITALS
HEART RATE: 98 BPM | OXYGEN SATURATION: 72 % | RESPIRATION RATE: 16 BRPM | BODY MASS INDEX: 34.27 KG/M2 | SYSTOLIC BLOOD PRESSURE: 124 MMHG | HEIGHT: 69 IN | DIASTOLIC BLOOD PRESSURE: 76 MMHG | TEMPERATURE: 98.8 F | WEIGHT: 231.4 LBS

## 2020-07-20 DIAGNOSIS — E66.9 OBESITY (BMI 30.0-34.9): ICD-10-CM

## 2020-07-20 DIAGNOSIS — E11.8 TYPE 2 DIABETES MELLITUS WITH COMPLICATION, WITHOUT LONG-TERM CURRENT USE OF INSULIN (HCC): ICD-10-CM

## 2020-07-20 DIAGNOSIS — I10 ESSENTIAL HYPERTENSION: Primary | ICD-10-CM

## 2020-07-20 DIAGNOSIS — E78.00 HYPERCHOLESTEROLEMIA: ICD-10-CM

## 2020-07-20 DIAGNOSIS — E11.9 TYPE 2 DIABETES MELLITUS WITHOUT COMPLICATION, WITHOUT LONG-TERM CURRENT USE OF INSULIN (HCC): ICD-10-CM

## 2020-07-20 PROCEDURE — 3008F BODY MASS INDEX DOCD: CPT | Performed by: INTERNAL MEDICINE

## 2020-07-20 PROCEDURE — 99214 OFFICE O/P EST MOD 30 MIN: CPT | Performed by: INTERNAL MEDICINE

## 2020-07-20 PROCEDURE — 4040F PNEUMOC VAC/ADMIN/RCVD: CPT | Performed by: INTERNAL MEDICINE

## 2020-07-20 PROCEDURE — 3051F HG A1C>EQUAL 7.0%<8.0%: CPT | Performed by: INTERNAL MEDICINE

## 2020-07-20 PROCEDURE — 3078F DIAST BP <80 MM HG: CPT | Performed by: INTERNAL MEDICINE

## 2020-07-20 PROCEDURE — 3074F SYST BP LT 130 MM HG: CPT | Performed by: INTERNAL MEDICINE

## 2020-07-20 PROCEDURE — 1160F RVW MEDS BY RX/DR IN RCRD: CPT | Performed by: INTERNAL MEDICINE

## 2020-07-20 PROCEDURE — 1036F TOBACCO NON-USER: CPT | Performed by: INTERNAL MEDICINE

## 2020-07-20 PROCEDURE — 3060F POS MICROALBUMINURIA REV: CPT | Performed by: INTERNAL MEDICINE

## 2020-07-20 RX ORDER — CETIRIZINE HYDROCHLORIDE 10 MG/1
10 TABLET ORAL DAILY
COMMUNITY
End: 2021-01-05

## 2020-07-20 RX ORDER — GLIMEPIRIDE 2 MG/1
3 TABLET ORAL
Qty: 135 TABLET | Refills: 1
Start: 2020-07-20 | End: 2020-11-10 | Stop reason: SDUPTHER

## 2020-07-20 NOTE — PROGRESS NOTES
Assessment/Plan:    Problem List Items Addressed This Visit        Endocrine    Type 2 diabetes mellitus with complication, without long-term current use of insulin (HCC)    Relevant Medications    glimepiride (AMARYL) 2 mg tablet       Cardiovascular and Mediastinum    Essential hypertension - Primary       Other    Hypercholesterolemia    Obesity (BMI 30 0-34 9)      Other Visit Diagnoses     Type 2 diabetes mellitus without complication, without long-term current use of insulin (HCC)        Relevant Medications    glimepiride (AMARYL) 2 mg tablet           Diagnoses and all orders for this visit:    Essential hypertension    Type 2 diabetes mellitus without complication, without long-term current use of insulin (HCC)  -     glimepiride (AMARYL) 2 mg tablet; Take 1 5 tablets (3 mg total) by mouth daily with breakfast    Type 2 diabetes mellitus with complication, without long-term current use of insulin (HCC)    Hypercholesterolemia    Obesity (BMI 30 0-34 9)    Other orders  -     cetirizine (ZyrTEC) 10 mg tablet; Take 10 mg by mouth daily        No problem-specific Assessment & Plan notes found for this encounter  Subjective: Follow with concerns regarding joint pain caused by statin     Patient ID: Nagi Goodson is a 77 y o  male  The patient was seen and examined and notes joint pain with statin  The patient states that when takes the statin he notes knee pain (no myalgia)  The patient notes that if he has any knee pain that he stops the statin and 3-4 weeks later the knee pain stops  Hence, he wants to stop the statin medication  I noted that this does not sound like a reaction to the statin, but is more likely the previously diagnosed OA noted in his knee  I offered him evaluation and treatment for his OA by ortho and he declined  Instead, he wants me to take him off his statin    I noted that I do not believe the statin is the cause of his pain and that he may stop any treatment anytime he wants, but I would not recommend this  He stated "I guess I will put up with the pain then"  He has stopped Metformin previously secondary to Myalgias and was changed to Glimepiride, which he notes he tolerates  His HgbA1c is 7 5%  The patient was told weight loss would help with his DM  His BP was noted to be good  The following portions of the patient's history were reviewed and updated as appropriate:   He has a past medical history of Arthritis of knee, right, History of gastroesophageal reflux (GERD), Hypercholesterolemia, Morbid (severe) obesity due to excess calories ([address] ), Papular rash, Pes anserine bursitis, Sebaceous cyst, Testicular hypogonadism, and Tick bite ,  does not have any pertinent problems on file  ,   has a past surgical history that includes Wrist fusion (Bilateral) and Elbow surgery (Right)  ,  family history includes Diabetes type II in his mother; Heart disease in his mother; No Known Problems in his father; Rheumatic fever in his mother  ,   reports that he has quit smoking  He has never used smokeless tobacco  He reports that he drinks alcohol  He reports that he does not use drugs  ,  is allergic to tape [medical tape] and pollen extract     Current Outpatient Medications   Medication Sig Dispense Refill    aspirin 81 MG tablet Take 81 mg by mouth once        cetirizine (ZyrTEC) 10 mg tablet Take 10 mg by mouth daily      diphenhydrAMINE (BENADRYL ALLERGY) 25 mg capsule Take 25 mg by mouth daily at bedtime as needed for itching      glimepiride (AMARYL) 2 mg tablet Take 1 5 tablets (3 mg total) by mouth daily with breakfast 135 tablet 1    glucosamine-chondroitin 500-400 MG tablet Take 1 tablet by mouth daily      glucose blood (ONE TOUCH ULTRA TEST) test strip 1 Squirt by In Vitro route daily      lisinopril (ZESTRIL) 20 mg tablet TAKE ONE TABLET BY MOUTH EVERY DAY 90 tablet 1    Omega-3 Fatty Acids (FISH OIL) 1,000 mg Take 1,000 mg by mouth daily      pravastatin (PRAVACHOL) 10 mg tablet Take 1 tablet (10 mg total) by mouth daily at bedtime 30 tablet 5    Triamcinolone Acetonide (NASACORT ALLERGY 24HR) 55 MCG/ACT AERO into each nostril       No current facility-administered medications for this visit  Review of Systems   Constitutional: Negative for chills, fatigue and fever  HENT: Negative  Respiratory: Negative for cough, chest tightness and shortness of breath  Cardiovascular: Negative for chest pain and palpitations  Gastrointestinal: Negative for abdominal pain, constipation, diarrhea, nausea and vomiting  Genitourinary: Negative  Musculoskeletal: Negative for back pain and myalgias  Skin: Negative  Neurological: Negative  Psychiatric/Behavioral: Negative for dysphoric mood  The patient is not nervous/anxious  Objective:  Vitals:    07/20/20 0706 07/20/20 0728   BP:  124/76   BP Location:  Left arm   Patient Position:  Sitting   Cuff Size:  Standard   Pulse: 98    Resp: 16    Temp: 98 8 °F (37 1 °C)    SpO2: (!) 72%    Weight: 105 kg (231 lb 6 4 oz)    Height: 5' 9" (1 753 m)      Body mass index is 34 17 kg/m²  Physical Exam   Constitutional: He is oriented to person, place, and time  He appears well-developed and well-nourished  HENT:   Head: Normocephalic and atraumatic  Eyes: Pupils are equal, round, and reactive to light  EOM are normal    Neck: Normal range of motion  Neck supple  Cardiovascular: Normal rate, regular rhythm, normal heart sounds and intact distal pulses  No murmur heard  Pulmonary/Chest: Effort normal and breath sounds normal  No stridor  He has no rales  Abdominal: Soft  Bowel sounds are normal  He exhibits no distension  There is no tenderness  Musculoskeletal: Normal range of motion  He exhibits no edema or deformity  Neurological: He is alert and oriented to person, place, and time  Skin: Skin is warm and dry  Psychiatric: He has a normal mood and affect     Nursing note and vitals reviewed         PHQ-9 Depression Screening    PHQ-9:    Frequency of the following problems over the past two weeks:

## 2020-08-01 DIAGNOSIS — E78.00 HYPERCHOLESTEROLEMIA: ICD-10-CM

## 2020-08-03 RX ORDER — PRAVASTATIN SODIUM 10 MG
TABLET ORAL
Qty: 90 TABLET | Refills: 1 | Status: SHIPPED | OUTPATIENT
Start: 2020-08-03 | End: 2021-01-25

## 2020-08-19 ENCOUNTER — TELEPHONE (OUTPATIENT)
Dept: GASTROENTEROLOGY | Facility: AMBULARY SURGERY CENTER | Age: 75
End: 2020-08-19

## 2020-08-19 ENCOUNTER — OFFICE VISIT (OUTPATIENT)
Dept: GASTROENTEROLOGY | Facility: CLINIC | Age: 75
End: 2020-08-19
Payer: COMMERCIAL

## 2020-08-19 VITALS
WEIGHT: 229.8 LBS | DIASTOLIC BLOOD PRESSURE: 85 MMHG | TEMPERATURE: 97.6 F | HEIGHT: 69 IN | HEART RATE: 58 BPM | BODY MASS INDEX: 34.04 KG/M2 | SYSTOLIC BLOOD PRESSURE: 169 MMHG

## 2020-08-19 DIAGNOSIS — K63.5 POLYP OF COLON, UNSPECIFIED PART OF COLON, UNSPECIFIED TYPE: ICD-10-CM

## 2020-08-19 DIAGNOSIS — Z12.11 SCREEN FOR COLON CANCER: Primary | ICD-10-CM

## 2020-08-19 DIAGNOSIS — A04.8 H. PYLORI INFECTION: ICD-10-CM

## 2020-08-19 PROCEDURE — 3008F BODY MASS INDEX DOCD: CPT | Performed by: INTERNAL MEDICINE

## 2020-08-19 PROCEDURE — 99204 OFFICE O/P NEW MOD 45 MIN: CPT | Performed by: INTERNAL MEDICINE

## 2020-08-19 PROCEDURE — 3077F SYST BP >= 140 MM HG: CPT | Performed by: INTERNAL MEDICINE

## 2020-08-19 PROCEDURE — 1160F RVW MEDS BY RX/DR IN RCRD: CPT | Performed by: INTERNAL MEDICINE

## 2020-08-19 PROCEDURE — 3051F HG A1C>EQUAL 7.0%<8.0%: CPT | Performed by: INTERNAL MEDICINE

## 2020-08-19 PROCEDURE — 3079F DIAST BP 80-89 MM HG: CPT | Performed by: INTERNAL MEDICINE

## 2020-08-19 PROCEDURE — 1036F TOBACCO NON-USER: CPT | Performed by: INTERNAL MEDICINE

## 2020-08-19 NOTE — PATIENT INSTRUCTIONS
Colonoscopy scheduled for 08/27/20 with Dr Pola Sexton [facility] instructions given by Romeo MUHAMMAD in the office

## 2020-08-19 NOTE — PROGRESS NOTES
[facility] - Outpatient Consultation  Néstor Carrington 77 y o  male MRN: [id number]  Encounter: [id number]          ASSESSMENT AND PLAN:      1  Screen for colon cancer  2  Polyp of colon, unspecified part of colon, unspecified type  -[facility]-could not get past transverse colon-reports not available  -repeat colonoscopy for evaluation for +occult blood testing  -golytely prep  - Colonoscopy; Future    3  H  pylori infection  -pt currently on some regimen  -recommend confirmation of eradication 1 month after treatment and off PPI x 2 weeks  -this can be managed by his primary gastroenterologist at the [facility] as needed    ______________________________________________________________________    HPI:  67yo male seen in consultation for colonoscopy  He recently [facility](report not available) for evaluation of a positive occult blood test  He was found to have H pylori for which he is currently receiving treatment as well as esophagitis for which he was started on a PPI  His colonoscopy was unable to be completed past the transverse colon for unknown reasons so he was referred to a tertiary center  He denies abdominal pain, unintentional weight loss, changes in bowel habits, diarrhea, constipation or blood in stool  Denies family history of colon cancer  He reports prior to this he has a colonoscopy in 2013 and had some polyps removed at that time  REVIEW OF SYSTEMS:    CONSTITUTIONAL: Denies any fever, chills, rigors, and weight loss  HEENT: No earache or tinnitus  Denies hearing loss or visual disturbances  CARDIOVASCULAR: No chest pain or palpitations  RESPIRATORY: Denies any cough, hemoptysis, shortness of breath or dyspnea on exertion  GASTROINTESTINAL: As noted in the History of Present Illness  GENITOURINARY: No problems with urination  Denies any hematuria or dysuria    NEUROLOGIC: No dizziness or vertigo, denies headaches  MUSCULOSKELETAL: Denies any muscle or joint pain  SKIN: Denies skin rashes or itching  ENDOCRINE: Denies excessive thirst  Denies intolerance to heat or cold  PSYCHOSOCIAL: Denies depression or anxiety  Denies any recent memory loss         Historical Information   Past Medical History:   Diagnosis Date    Arthritis of knee, right     LAST ASSESSED: 2/7/17    History of gastroesophageal reflux (GERD)     LAST ASSESSED: 5/9/17    Hypercholesterolemia     LAST ASSESSED: AND RESOLVED: 1/9/18    Morbid (severe) obesity due to excess calories (HCC)     LAST ASSESSED: 5/10/16    Papular rash     RESOLVED: 2/1/16    Pes anserine bursitis     LAST ASSESSED: 11/29/16    Sebaceous cyst     LAST ASSESSED: 10/21/14    Testicular hypogonadism     LAST ASSESSED: 10/30/13    Tick bite     LAST ASSESSED: 11/14/16     Past Surgical History:   Procedure Laterality Date    COLONOSCOPY      ELBOW SURGERY Right     UPPER GASTROINTESTINAL ENDOSCOPY      WRIST FUSION Bilateral      Social History   Social History     Substance and Sexual Activity   Alcohol Use Yes    Comment: Sravanthi      Social History     Substance and Sexual Activity   Drug Use No     Social History     Tobacco Use   Smoking Status Former Smoker   Smokeless Tobacco Never Used     Family History   Problem Relation Age of Onset    Diabetes type II Mother         MELLITUS    Rheumatic fever Mother     Heart disease Mother     No Known Problems Father        Meds/Allergies       Current Outpatient Medications:     aspirin 81 MG tablet    cetirizine (ZyrTEC) 10 mg tablet    diphenhydrAMINE (BENADRYL ALLERGY) 25 mg capsule    glimepiride (AMARYL) 2 mg tablet    glucosamine-chondroitin 500-400 MG tablet    glucose blood (ONE TOUCH ULTRA TEST) test strip    lisinopril (ZESTRIL) 20 mg tablet    Omega-3 Fatty Acids (FISH OIL) 1,000 mg    pravastatin (PRAVACHOL) 10 mg tablet    Triamcinolone Acetonide (NASACORT ALLERGY 24HR) 55 MCG/ACT AERO    Allergies   Allergen Reactions    Tape [Medical Tape]      BANDAID ADHESIVE    Pollen Extract Allergic Rhinitis           Objective     Blood pressure 169/85, pulse 58, temperature 97 6 °F (36 4 °C), temperature source Tympanic, height 5' 9" (1 753 m), weight 104 kg (229 lb 12 8 oz)  Body mass index is 33 94 kg/m²  PHYSICAL EXAM:      General Appearance:   Alert, cooperative, no distress   HEENT:   Normocephalic, atraumatic, anicteric      Neck:  Supple, symmetrical, trachea midline   Lungs:   Clear to auscultation bilaterally; no rales, rhonchi or wheezing; respirations unlabored    Heart[de-identified]   Regular rate and rhythm; no murmur, rub, or gallop  Abdomen:   Soft, non-tender, non-distended; normal bowel sounds; no masses, no organomegaly    Genitalia:   Deferred    Rectal:   Deferred    Extremities:  No cyanosis, clubbing or edema        Skin:  No jaundice, rashes, or lesions    Lymph nodes:  No palpable cervical lymphadenopathy        Lab Results:   No visits with results within 1 Day(s) from this visit     Latest known visit with results is:   Appointment on 07/14/2020   Component Date Value    Sodium 07/14/2020 137     Potassium 07/14/2020 4 6     Chloride 07/14/2020 105     CO2 07/14/2020 27     ANION GAP 07/14/2020 5     BUN 07/14/2020 18     Creatinine 07/14/2020 1 06     Glucose, Fasting 07/14/2020 172*    Calcium 07/14/2020 9 6     AST 07/14/2020 31     ALT 07/14/2020 55     Alkaline Phosphatase 07/14/2020 79     Total Protein 07/14/2020 8 1     Albumin 07/14/2020 3 7     Total Bilirubin 07/14/2020 0 80     eGFR 07/14/2020 69     Cholesterol 07/14/2020 148     Triglycerides 07/14/2020 97     HDL, Direct 07/14/2020 33*    LDL Calculated 07/14/2020 96     WBC 07/14/2020 6 24     RBC 07/14/2020 5 29     Hemoglobin 07/14/2020 15 7     Hematocrit 07/14/2020 48 3     MCV 07/14/2020 91     MCH 07/14/2020 29 7     MCHC 07/14/2020 32 5     RDW 07/14/2020 12 6     MPV 07/14/2020 11 6     Platelets 99/74/8026 263     nRBC 07/14/2020 0     Neutrophils Relative 07/14/2020 52     Immat GRANS % 07/14/2020 0     Lymphocytes Relative 07/14/2020 32     Monocytes Relative 07/14/2020 9     Eosinophils Relative 07/14/2020 6     Basophils Relative 07/14/2020 1     Neutrophils Absolute 07/14/2020 3 22     Immature Grans Absolute 07/14/2020 0 01     Lymphocytes Absolute 07/14/2020 1 99     Monocytes Absolute 07/14/2020 0 56     Eosinophils Absolute 07/14/2020 0 40     Basophils Absolute 07/14/2020 0 06     Hemoglobin A1C 07/14/2020 7 5*    EAG 07/14/2020 169          Radiology Results:   No results found

## 2020-08-19 NOTE — TELEPHONE ENCOUNTER
Patients GI provider:  Dr Pili De León    Number to return call: (  [phone]    Reason for call: [facility] Orte[facility] for [facility]    Scheduled procedure/appointment date if applicable: Apt/procedure   8-27-20

## 2020-08-26 ENCOUNTER — ANESTHESIA EVENT (OUTPATIENT)
Dept: GASTROENTEROLOGY | Facility: HOSPITAL | Age: 75
End: 2020-08-26

## 2020-08-26 NOTE — ANESTHESIA PREPROCEDURE EVALUATION
Procedure:  COLONOSCOPY    Relevant Problems   ANESTHESIA (within normal limits)      CARDIO   (+) Essential hypertension   (+) Hypercholesterolemia      ENDO  HbA1c 7,3   (+) Type 2 diabetes mellitus with complication, without long-term current use of insulin (HCC)      GI/HEPATIC  bowel prep   (+) Gastroesophageal reflux disease without esophagitis      /RENAL  BPH   (+) Enlarged prostate with lower urinary tract symptoms (LUTS)      MUSCULOSKELETAL   (+) Arthritis of knee, right      NEURO/PSYCH (within normal limits)      PULMONARY   (-) Sleep apnea   (-) Smoking   (-) URI (upper respiratory infection)        Physical Exam    Airway       Dental       Cardiovascular  Cardiovascular exam normal    Pulmonary  Pulmonary exam normal     Other Findings        Anesthesia Plan  ASA Score- 2     Anesthesia Type- IV sedation with anesthesia with ASA Monitors  Additional Monitors:   Airway Plan:           Plan Factors-Exercise tolerance (METS): >4 METS  Chart reviewed  Existing labs reviewed  Patient summary reviewed  Patient is not a current smoker  Patient not instructed to abstain from smoking on day of procedure  Patient did not smoke on day of surgery  Induction- intravenous  Postoperative Plan-     Informed Consent- Anesthetic plan and risks discussed with patient  I personally reviewed this patient with the CRNA  Discussed and agreed on the Anesthesia Plan with the CRNA  Christian Sanford

## 2020-08-27 ENCOUNTER — HOSPITAL ENCOUNTER (OUTPATIENT)
Dept: GASTROENTEROLOGY | Facility: HOSPITAL | Age: 75
Setting detail: OUTPATIENT SURGERY
Discharge: HOME | End: 2020-08-27
Attending: INTERNAL MEDICINE | Admitting: INTERNAL MEDICINE
Payer: COMMERCIAL

## 2020-08-27 ENCOUNTER — ANESTHESIA (OUTPATIENT)
Dept: GASTROENTEROLOGY | Facility: HOSPITAL | Age: 75
End: 2020-08-27

## 2020-08-27 VITALS
HEART RATE: 53 BPM | DIASTOLIC BLOOD PRESSURE: 80 MMHG | BODY MASS INDEX: 33.92 KG/M2 | OXYGEN SATURATION: 95 % | WEIGHT: 229 LBS | SYSTOLIC BLOOD PRESSURE: 137 MMHG | HEIGHT: 69 IN | RESPIRATION RATE: 16 BRPM | TEMPERATURE: 97.2 F

## 2020-08-27 DIAGNOSIS — Z12.11 SCREEN FOR COLON CANCER: ICD-10-CM

## 2020-08-27 LAB — GLUCOSE SERPL-MCNC: 134 MG/DL (ref 65–140)

## 2020-08-27 PROCEDURE — 45385 COLONOSCOPY W/LESION REMOVAL: CPT | Performed by: INTERNAL MEDICINE

## 2020-08-27 PROCEDURE — 45380 COLONOSCOPY AND BIOPSY: CPT | Performed by: INTERNAL MEDICINE

## 2020-08-27 PROCEDURE — 82948 REAGENT STRIP/BLOOD GLUCOSE: CPT

## 2020-08-27 PROCEDURE — 88305 TISSUE EXAM BY PATHOLOGIST: CPT | Performed by: PATHOLOGY

## 2020-08-27 RX ORDER — LIDOCAINE HYDROCHLORIDE 10 MG/ML
INJECTION, SOLUTION EPIDURAL; INFILTRATION; INTRACAUDAL; PERINEURAL AS NEEDED
Status: DISCONTINUED | OUTPATIENT
Start: 2020-08-27 | End: 2020-08-27

## 2020-08-27 RX ORDER — PROPOFOL 10 MG/ML
INJECTION, EMULSION INTRAVENOUS AS NEEDED
Status: DISCONTINUED | OUTPATIENT
Start: 2020-08-27 | End: 2020-08-27

## 2020-08-27 RX ORDER — SODIUM CHLORIDE 9 MG/ML
125 INJECTION, SOLUTION INTRAVENOUS CONTINUOUS
Status: DISCONTINUED | OUTPATIENT
Start: 2020-08-27 | End: 2020-08-31 | Stop reason: HOSPADM

## 2020-08-27 RX ADMIN — PROPOFOL 50 MG: 10 INJECTION, EMULSION INTRAVENOUS at 08:50

## 2020-08-27 RX ADMIN — LIDOCAINE HYDROCHLORIDE 50 MG: 10 INJECTION, SOLUTION EPIDURAL; INFILTRATION; INTRACAUDAL; PERINEURAL at 08:44

## 2020-08-27 RX ADMIN — SODIUM CHLORIDE: 0.9 INJECTION, SOLUTION INTRAVENOUS at 08:21

## 2020-08-27 RX ADMIN — PROPOFOL 140 MG: 10 INJECTION, EMULSION INTRAVENOUS at 08:44

## 2020-08-27 NOTE — ANESTHESIA POSTPROCEDURE EVALUATION
Post-Op Assessment Note    CV Status:  Stable  Pain Score: 0    Pain management: adequate     Mental Status:  Alert and awake   Hydration Status:  Euvolemic   PONV Controlled:  Controlled   Airway Patency:  Patent      Post Op Vitals Reviewed: Yes      Staff: CRNA         No complications documented      BP   121/68   Temp     Pulse  65   Resp   24   SpO2   99

## 2020-08-27 NOTE — H&P
History and Physical -  [facility]  Soraya Knapp 77 y o  male MRN: [id number]                  HPI: Soraya Knapp is a 77y o  year old male who presents for colonoscopy evaluation  Colonoscopy attempt at the [facility] could not get past the transverse colon  REVIEW OF SYSTEMS: Per the HPI, and otherwise unremarkable  Historical Information   Past Medical History:   Diagnosis Date    Arthritis of knee, right     LAST ASSESSED: 2/7/17    History of gastroesophageal reflux (GERD)     LAST ASSESSED: 5/9/17    Hypercholesterolemia     LAST ASSESSED: AND RESOLVED: 1/9/18    Morbid (severe) obesity due to excess calories (HCC)     LAST ASSESSED: 5/10/16    Papular rash     RESOLVED: 2/1/16    Pes anserine bursitis     LAST ASSESSED: 11/29/16    Sebaceous cyst     LAST ASSESSED: 10/21/14    Testicular hypogonadism     LAST ASSESSED: 10/30/13    Tick bite     LAST ASSESSED: 11/14/16     Past Surgical History:   Procedure Laterality Date    COLONOSCOPY      ELBOW SURGERY Right     UPPER GASTROINTESTINAL ENDOSCOPY      WRIST FUSION Bilateral      Social History   Social History     Substance and Sexual Activity   Alcohol Use Yes    Comment: Sravanthi      Social History     Substance and Sexual Activity   Drug Use No     Social History     Tobacco Use   Smoking Status Former Smoker   Smokeless Tobacco Never Used     Family History   Problem Relation Age of Onset    Diabetes type II Mother         MELLITUS    Rheumatic fever Mother     Heart disease Mother     No Known Problems Father        Meds/Allergies     (Not in a hospital admission)      Allergies   Allergen Reactions    Tape [Medical Tape]      BANDAID ADHESIVE    Pollen Extract Allergic Rhinitis       Objective     There were no vitals taken for this visit        PHYSICAL EXAM    Gen: NAD  CV: RRR  CHEST: Clear  ABD: soft, NT/ND  EXT: no edema      ASSESSMENT/PLAN:  This is a 77y o  year old male here for  colonoscopy evaluation, and he is stable and optimized for his procedure

## 2020-09-01 ENCOUNTER — TELEPHONE (OUTPATIENT)
Dept: GASTROENTEROLOGY | Facility: CLINIC | Age: 75
End: 2020-09-01

## 2020-09-01 NOTE — TELEPHONE ENCOUNTER
----- Message from Shakeel Lynch MD sent at 9/1/2020 12:38 PM EDT -----  Removed polyp was benign on further evaluation is required

## 2020-10-26 ENCOUNTER — OFFICE VISIT (OUTPATIENT)
Dept: INTERNAL MEDICINE CLINIC | Facility: CLINIC | Age: 75
End: 2020-10-26
Payer: COMMERCIAL

## 2020-10-26 VITALS
DIASTOLIC BLOOD PRESSURE: 84 MMHG | WEIGHT: 231.5 LBS | OXYGEN SATURATION: 95 % | TEMPERATURE: 97.1 F | HEART RATE: 76 BPM | SYSTOLIC BLOOD PRESSURE: 142 MMHG | BODY MASS INDEX: 34.29 KG/M2 | HEIGHT: 69 IN

## 2020-10-26 DIAGNOSIS — A04.8 H. PYLORI INFECTION: ICD-10-CM

## 2020-10-26 DIAGNOSIS — E11.8 TYPE 2 DIABETES MELLITUS WITH COMPLICATION, WITHOUT LONG-TERM CURRENT USE OF INSULIN (HCC): ICD-10-CM

## 2020-10-26 DIAGNOSIS — L03.113 CELLULITIS OF RIGHT UPPER EXTREMITY: Primary | ICD-10-CM

## 2020-10-26 DIAGNOSIS — L24.9 IRRITANT CONTACT DERMATITIS, UNSPECIFIED TRIGGER: ICD-10-CM

## 2020-10-26 PROCEDURE — 96372 THER/PROPH/DIAG INJ SC/IM: CPT | Performed by: INTERNAL MEDICINE

## 2020-10-26 PROCEDURE — 99213 OFFICE O/P EST LOW 20 MIN: CPT | Performed by: INTERNAL MEDICINE

## 2020-10-26 RX ORDER — CEPHALEXIN 500 MG/1
500 CAPSULE ORAL EVERY 8 HOURS SCHEDULED
Qty: 15 CAPSULE | Refills: 0 | Status: SHIPPED | OUTPATIENT
Start: 2020-10-26 | End: 2020-10-31

## 2020-10-26 RX ORDER — DEXAMETHASONE SODIUM PHOSPHATE 4 MG/ML
4 INJECTION, SOLUTION INTRA-ARTICULAR; INTRALESIONAL; INTRAMUSCULAR; INTRAVENOUS; SOFT TISSUE ONCE
Status: COMPLETED | OUTPATIENT
Start: 2020-10-26 | End: 2020-10-26

## 2020-10-26 RX ORDER — PREDNISONE 10 MG/1
TABLET ORAL
Qty: 50 TABLET | Refills: 0 | Status: SHIPPED | OUTPATIENT
Start: 2020-10-26 | End: 2021-01-05

## 2020-10-26 RX ADMIN — DEXAMETHASONE SODIUM PHOSPHATE 124 MG: 4 INJECTION, SOLUTION INTRA-ARTICULAR; INTRALESIONAL; INTRAMUSCULAR; INTRAVENOUS; SOFT TISSUE at 16:32

## 2020-11-10 ENCOUNTER — OFFICE VISIT (OUTPATIENT)
Dept: INTERNAL MEDICINE CLINIC | Facility: CLINIC | Age: 75
End: 2020-11-10
Payer: COMMERCIAL

## 2020-11-10 VITALS
OXYGEN SATURATION: 97 % | HEART RATE: 83 BPM | TEMPERATURE: 98.3 F | WEIGHT: 227.6 LBS | RESPIRATION RATE: 14 BRPM | SYSTOLIC BLOOD PRESSURE: 136 MMHG | BODY MASS INDEX: 33.71 KG/M2 | HEIGHT: 69 IN | DIASTOLIC BLOOD PRESSURE: 84 MMHG

## 2020-11-10 DIAGNOSIS — B36.9 FUNGAL DERMATOSIS: ICD-10-CM

## 2020-11-10 DIAGNOSIS — E11.9 TYPE 2 DIABETES MELLITUS WITHOUT COMPLICATION, WITHOUT LONG-TERM CURRENT USE OF INSULIN (HCC): ICD-10-CM

## 2020-11-10 DIAGNOSIS — E11.8 TYPE 2 DIABETES MELLITUS WITH COMPLICATION, WITHOUT LONG-TERM CURRENT USE OF INSULIN (HCC): Primary | ICD-10-CM

## 2020-11-10 LAB — SL AMB POCT HEMOGLOBIN AIC: 11.1 (ref ?–6.5)

## 2020-11-10 PROCEDURE — 99214 OFFICE O/P EST MOD 30 MIN: CPT | Performed by: INTERNAL MEDICINE

## 2020-11-10 PROCEDURE — 3046F HEMOGLOBIN A1C LEVEL >9.0%: CPT | Performed by: INTERNAL MEDICINE

## 2020-11-10 PROCEDURE — 3008F BODY MASS INDEX DOCD: CPT | Performed by: INTERNAL MEDICINE

## 2020-11-10 PROCEDURE — 3075F SYST BP GE 130 - 139MM HG: CPT | Performed by: INTERNAL MEDICINE

## 2020-11-10 PROCEDURE — 3725F SCREEN DEPRESSION PERFORMED: CPT | Performed by: INTERNAL MEDICINE

## 2020-11-10 PROCEDURE — 1160F RVW MEDS BY RX/DR IN RCRD: CPT | Performed by: INTERNAL MEDICINE

## 2020-11-10 PROCEDURE — 83036 HEMOGLOBIN GLYCOSYLATED A1C: CPT | Performed by: INTERNAL MEDICINE

## 2020-11-10 PROCEDURE — 1036F TOBACCO NON-USER: CPT | Performed by: INTERNAL MEDICINE

## 2020-11-10 PROCEDURE — 3079F DIAST BP 80-89 MM HG: CPT | Performed by: INTERNAL MEDICINE

## 2020-11-10 RX ORDER — TERBINAFINE HYDROCHLORIDE 250 MG/1
250 TABLET ORAL DAILY
Qty: 14 TABLET | Refills: 0 | Status: SHIPPED | OUTPATIENT
Start: 2020-11-10 | End: 2020-11-24

## 2020-11-10 RX ORDER — GLIMEPIRIDE 4 MG/1
4 TABLET ORAL
Qty: 90 TABLET | Refills: 1 | Status: SHIPPED | OUTPATIENT
Start: 2020-11-10 | End: 2020-11-17 | Stop reason: SDUPTHER

## 2020-11-16 ENCOUNTER — TELEPHONE (OUTPATIENT)
Dept: INTERNAL MEDICINE CLINIC | Facility: CLINIC | Age: 75
End: 2020-11-16

## 2020-11-17 DIAGNOSIS — E11.9 TYPE 2 DIABETES MELLITUS WITHOUT COMPLICATION, WITHOUT LONG-TERM CURRENT USE OF INSULIN (HCC): ICD-10-CM

## 2020-11-17 RX ORDER — GLIMEPIRIDE 4 MG/1
4 TABLET ORAL
Qty: 90 TABLET | Refills: 1 | Status: SHIPPED | OUTPATIENT
Start: 2020-11-17 | End: 2021-02-09 | Stop reason: SDUPTHER

## 2020-11-17 RX ORDER — BLOOD SUGAR DIAGNOSTIC
STRIP MISCELLANEOUS
Qty: 200 EACH | Refills: 1 | Status: SHIPPED | OUTPATIENT
Start: 2020-11-17 | End: 2021-04-12

## 2021-01-05 ENCOUNTER — OFFICE VISIT (OUTPATIENT)
Dept: INTERNAL MEDICINE CLINIC | Facility: CLINIC | Age: 76
End: 2021-01-05
Payer: COMMERCIAL

## 2021-01-05 VITALS
TEMPERATURE: 97.7 F | BODY MASS INDEX: 32.94 KG/M2 | HEIGHT: 69 IN | HEART RATE: 61 BPM | DIASTOLIC BLOOD PRESSURE: 76 MMHG | OXYGEN SATURATION: 96 % | WEIGHT: 222.4 LBS | RESPIRATION RATE: 14 BRPM | SYSTOLIC BLOOD PRESSURE: 128 MMHG

## 2021-01-05 DIAGNOSIS — E11.9 TYPE 2 DIABETES MELLITUS WITHOUT COMPLICATION, WITHOUT LONG-TERM CURRENT USE OF INSULIN (HCC): ICD-10-CM

## 2021-01-05 DIAGNOSIS — K21.9 GASTROESOPHAGEAL REFLUX DISEASE WITHOUT ESOPHAGITIS: ICD-10-CM

## 2021-01-05 DIAGNOSIS — E11.8 TYPE 2 DIABETES MELLITUS WITH COMPLICATION, WITHOUT LONG-TERM CURRENT USE OF INSULIN (HCC): Primary | ICD-10-CM

## 2021-01-05 DIAGNOSIS — A04.8 H. PYLORI INFECTION: ICD-10-CM

## 2021-01-05 LAB — SL AMB POCT HEMOGLOBIN AIC: 10.2 (ref ?–6.5)

## 2021-01-05 PROCEDURE — 1036F TOBACCO NON-USER: CPT | Performed by: INTERNAL MEDICINE

## 2021-01-05 PROCEDURE — 3046F HEMOGLOBIN A1C LEVEL >9.0%: CPT | Performed by: INTERNAL MEDICINE

## 2021-01-05 PROCEDURE — 83036 HEMOGLOBIN GLYCOSYLATED A1C: CPT | Performed by: INTERNAL MEDICINE

## 2021-01-05 PROCEDURE — 3078F DIAST BP <80 MM HG: CPT | Performed by: INTERNAL MEDICINE

## 2021-01-05 PROCEDURE — 1160F RVW MEDS BY RX/DR IN RCRD: CPT | Performed by: INTERNAL MEDICINE

## 2021-01-05 PROCEDURE — 99214 OFFICE O/P EST MOD 30 MIN: CPT | Performed by: INTERNAL MEDICINE

## 2021-01-05 PROCEDURE — 3074F SYST BP LT 130 MM HG: CPT | Performed by: INTERNAL MEDICINE

## 2021-01-05 PROCEDURE — 3008F BODY MASS INDEX DOCD: CPT | Performed by: INTERNAL MEDICINE

## 2021-01-05 RX ORDER — PANTOPRAZOLE SODIUM 20 MG/1
TABLET, DELAYED RELEASE ORAL
COMMUNITY
Start: 2020-11-23 | End: 2021-01-05 | Stop reason: SDUPTHER

## 2021-01-05 RX ORDER — PANTOPRAZOLE SODIUM 20 MG/1
20 TABLET, DELAYED RELEASE ORAL DAILY
Qty: 90 TABLET | Refills: 1 | Status: SHIPPED | OUTPATIENT
Start: 2021-01-05 | End: 2021-05-17

## 2021-01-05 NOTE — PROGRESS NOTES
Assessment/Plan:    Problem List Items Addressed This Visit        Digestive    Gastroesophageal reflux disease without esophagitis    Relevant Medications    pantoprazole (PROTONIX) 20 mg tablet       Endocrine    Type 2 diabetes mellitus with complication, without long-term current use of insulin ([facility]) - Primary    Relevant Medications    linaGLIPtin 5 MG TABS    Other Relevant Orders    POCT hemoglobin A1c (Completed)       Other    H  pylori infection    Relevant Medications    pantoprazole (PROTONIX) 20 mg tablet      Other Visit Diagnoses     Type 2 diabetes mellitus without complication, without long-term current use of insulin ([facility])        Relevant Medications    linaGLIPtin 5 MG TABS           Diagnoses and all orders for this visit:    Type 2 diabetes mellitus with complication, without long-term current use of insulin (HCC)  -     POCT hemoglobin A1c    Type 2 diabetes mellitus without complication, without long-term current use of insulin ([facility])  -     linaGLIPtin 5 MG TABS; Take 5 mg by mouth daily    H  pylori infection  -     pantoprazole (PROTONIX) 20 mg tablet; Take 1 tablet (20 mg total) by mouth daily    Gastroesophageal reflux disease without esophagitis    Other orders  -     Discontinue: pantoprazole (PROTONIX) 20 mg tablet; TAKE ONE TABLET BY MOUTH EVERY MORNING FOR STOMACH OR REFLUX        No problem-specific Assessment & Plan notes found for this encounter  Subjective: The patient states that there are no other issues at this time     Patient ID: Fritz Truong is a 77 y o  male  The patient states that he has had issues with hyperglycemia with just the [place] and is interested in restarting that  His HgbA1c is noted to be good at 10 2%  The patient states that there are no other concerns at this time  His BP is good  He was started on GERD medications (Pantoprazole) 20 mg  The patient states this helps with reflux        The following portions of the patient's history were reviewed and updated as appropriate:   He has a past medical history of Arthritis of knee, right, History of gastroesophageal reflux (GERD), Hypercholesterolemia, Morbid (severe) obesity due to excess calories ([address] ), Papular rash, Pes anserine bursitis, Sebaceous cyst, Testicular hypogonadism, and Tick bite ,  does not have any pertinent problems on file  ,   has a past surgical history that includes Wrist fusion (Bilateral); Elbow surgery (Right); Colonoscopy; and Upper gastrointestinal endoscopy  ,  family history includes Diabetes type II in his mother; Heart disease in his mother; No Known Problems in his father; Rheumatic fever in his mother  ,   reports that he has quit smoking  He has never used smokeless tobacco  He reports current alcohol use  He reports that he does not use drugs  ,  is allergic to tape [medical tape] and pollen extract     Current Outpatient Medications   Medication Sig Dispense Refill    aspirin 81 MG tablet Take 81 mg by mouth once        glimepiride (AMARYL) 4 mg tablet Take 1 tablet (4 mg total) by mouth daily with breakfast (Patient taking differently: Take 4 mg by mouth 2 (two) times a day ) 90 tablet 1    glucose blood (ONE TOUCH ULTRA TEST) test strip 1 Squirt by In Vitro route daily      glucose blood (OneTouch Ultra) test strip TEST SUGAR TWICE DAILY 200 each 1    lisinopril (ZESTRIL) 20 mg tablet TAKE ONE TABLET BY MOUTH EVERY DAY 90 tablet 1    Omega-3 Fatty Acids (FISH OIL) 1,000 mg Take 1,000 mg by mouth daily      pantoprazole (PROTONIX) 20 mg tablet Take 1 tablet (20 mg total) by mouth daily 90 tablet 1    pravastatin (PRAVACHOL) 10 mg tablet TAKE 1 TABLET BY MOUTH AT BEDTIME 90 tablet 1    Triamcinolone Acetonide (NASACORT ALLERGY 24HR) 55 MCG/ACT AERO into each nostril      linaGLIPtin 5 MG TABS Take 5 mg by mouth daily 90 tablet 1     No current facility-administered medications for this visit          Review of Systems   Constitutional: Negative for chills, fatigue and fever  HENT: Negative  Respiratory: Negative for cough, chest tightness and shortness of breath  Cardiovascular: Negative for chest pain and palpitations  Gastrointestinal: Negative for abdominal pain, constipation, diarrhea, nausea and vomiting  Genitourinary: Negative  Musculoskeletal: Negative for back pain and myalgias  Skin: Negative  Neurological: Negative  Psychiatric/Behavioral: Negative for dysphoric mood  The patient is not nervous/anxious  Objective:  Vitals:    01/05/21 0932   BP: 128/76   BP Location: Left arm   Patient Position: Sitting   Cuff Size: Large   Pulse: 61   Resp: 14   Temp: 97 7 °F (36 5 °C)   SpO2: 96%   Weight: 101 kg (222 lb 6 4 oz)   Height: 5' 9" (1 753 m)     Body mass index is 32 84 kg/m²  Physical Exam  Vitals signs and nursing note reviewed  Constitutional:       Appearance: He is well-developed  HENT:      Head: Normocephalic and atraumatic  Eyes:      Pupils: Pupils are equal, round, and reactive to light  Neck:      Musculoskeletal: Normal range of motion and neck supple  Cardiovascular:      Rate and Rhythm: Normal rate and regular rhythm  Heart sounds: Normal heart sounds  No murmur  Pulmonary:      Effort: Pulmonary effort is normal       Breath sounds: Normal breath sounds  No stridor  No rales  Abdominal:      General: Bowel sounds are normal  There is no distension  Palpations: Abdomen is soft  Tenderness: There is no abdominal tenderness  Musculoskeletal: Normal range of motion  General: No deformity  Skin:     General: Skin is warm and dry  Neurological:      Mental Status: He is alert and oriented to person, place, and time            PHQ-9 Depression Screening    PHQ-9:   Frequency of the following problems over the past two weeks:

## 2021-01-07 DIAGNOSIS — I10 ESSENTIAL HYPERTENSION: ICD-10-CM

## 2021-01-07 PROCEDURE — 4010F ACE/ARB THERAPY RXD/TAKEN: CPT | Performed by: INTERNAL MEDICINE

## 2021-01-07 RX ORDER — LISINOPRIL 20 MG/1
TABLET ORAL
Qty: 90 TABLET | Refills: 1 | Status: SHIPPED | OUTPATIENT
Start: 2021-01-07 | End: 2021-02-09

## 2021-01-25 DIAGNOSIS — E78.00 HYPERCHOLESTEROLEMIA: ICD-10-CM

## 2021-01-25 RX ORDER — PRAVASTATIN SODIUM 10 MG
TABLET ORAL
Qty: 90 TABLET | Refills: 1 | Status: SHIPPED | OUTPATIENT
Start: 2021-01-25 | End: 2021-05-10

## 2021-02-01 LAB
CREAT ?TM UR-SCNC: 139.4 UMOL/L
EXT MICROALBUMIN URINE RANDOM: 4.2
HBA1C MFR BLD HPLC: 9.5 %
MICROALBUMIN/CREAT UR: 30.1 MG/G{CREAT}

## 2021-02-01 PROCEDURE — 3046F HEMOGLOBIN A1C LEVEL >9.0%: CPT | Performed by: INTERNAL MEDICINE

## 2021-02-09 ENCOUNTER — OFFICE VISIT (OUTPATIENT)
Dept: INTERNAL MEDICINE CLINIC | Facility: CLINIC | Age: 76
End: 2021-02-09
Payer: COMMERCIAL

## 2021-02-09 VITALS
BODY MASS INDEX: 32.61 KG/M2 | RESPIRATION RATE: 14 BRPM | WEIGHT: 220.2 LBS | DIASTOLIC BLOOD PRESSURE: 84 MMHG | OXYGEN SATURATION: 97 % | TEMPERATURE: 97.4 F | HEIGHT: 69 IN | HEART RATE: 65 BPM | SYSTOLIC BLOOD PRESSURE: 128 MMHG

## 2021-02-09 DIAGNOSIS — E66.9 OBESITY (BMI 30.0-34.9): ICD-10-CM

## 2021-02-09 DIAGNOSIS — B36.9 FUNGAL DERMATOSIS: ICD-10-CM

## 2021-02-09 DIAGNOSIS — E11.8 TYPE 2 DIABETES MELLITUS WITH COMPLICATION, WITHOUT LONG-TERM CURRENT USE OF INSULIN (HCC): Primary | ICD-10-CM

## 2021-02-09 DIAGNOSIS — I10 ESSENTIAL HYPERTENSION: ICD-10-CM

## 2021-02-09 DIAGNOSIS — E11.9 TYPE 2 DIABETES MELLITUS WITHOUT COMPLICATION, WITHOUT LONG-TERM CURRENT USE OF INSULIN (HCC): ICD-10-CM

## 2021-02-09 PROCEDURE — 3288F FALL RISK ASSESSMENT DOCD: CPT | Performed by: INTERNAL MEDICINE

## 2021-02-09 PROCEDURE — 1101F PT FALLS ASSESS-DOCD LE1/YR: CPT | Performed by: INTERNAL MEDICINE

## 2021-02-09 PROCEDURE — 1036F TOBACCO NON-USER: CPT | Performed by: INTERNAL MEDICINE

## 2021-02-09 PROCEDURE — 3079F DIAST BP 80-89 MM HG: CPT | Performed by: INTERNAL MEDICINE

## 2021-02-09 PROCEDURE — 99214 OFFICE O/P EST MOD 30 MIN: CPT | Performed by: INTERNAL MEDICINE

## 2021-02-09 PROCEDURE — 1160F RVW MEDS BY RX/DR IN RCRD: CPT | Performed by: INTERNAL MEDICINE

## 2021-02-09 PROCEDURE — 3074F SYST BP LT 130 MM HG: CPT | Performed by: INTERNAL MEDICINE

## 2021-02-09 PROCEDURE — 3725F SCREEN DEPRESSION PERFORMED: CPT | Performed by: INTERNAL MEDICINE

## 2021-02-09 PROCEDURE — 3008F BODY MASS INDEX DOCD: CPT | Performed by: INTERNAL MEDICINE

## 2021-02-09 RX ORDER — NYSTATIN 100000 U/G
CREAM TOPICAL 2 TIMES DAILY
Qty: 30 G | Refills: 0 | Status: SHIPPED | OUTPATIENT
Start: 2021-02-09 | End: 2022-04-05

## 2021-02-09 RX ORDER — GLIMEPIRIDE 4 MG/1
2 TABLET ORAL
Qty: 45 TABLET | Refills: 1 | Status: SHIPPED | OUTPATIENT
Start: 2021-02-09 | End: 2021-04-05

## 2021-02-09 RX ORDER — NYSTATIN 100000 U/G
CREAM TOPICAL 2 TIMES DAILY
Qty: 30 G | Refills: 0 | Status: SHIPPED | OUTPATIENT
Start: 2021-02-09 | End: 2021-02-09

## 2021-02-09 RX ORDER — LISINOPRIL AND HYDROCHLOROTHIAZIDE 20; 12.5 MG/1; MG/1
1 TABLET ORAL DAILY
COMMUNITY
End: 2021-07-06 | Stop reason: SDUPTHER

## 2021-02-09 NOTE — PROGRESS NOTES
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 and we will give him Nystatin  He will titrate up the Glimepiride to control his DM  His last A1c was noted to be 9 5%  The patient will take that statin once a day  Preferably in eveing  Problem List Items Addressed This Visit        Endocrine    Type 2 diabetes mellitus with complication, without long-term current use of insulin ([facility]) - Primary    Relevant Medications    glimepiride (AMARYL) 4 mg tablet    Other Relevant Orders    HEMOGLOBIN A1C W/ EAG ESTIMATION    Hemoglobin A1C       Musculoskeletal and Integument    Fungal dermatosis    Relevant Medications    nystatin (MYCOSTATIN) cream      Other Visit Diagnoses     Type 2 diabetes mellitus without complication, without long-term current use of insulin ([facility])        Relevant Medications    glimepiride (AMARYL) 4 mg tablet           Diagnoses and all orders for this visit:    Type 2 diabetes mellitus with complication, without long-term current use of insulin ([facility])  -     HEMOGLOBIN A1C W/ EAG ESTIMATION; Future  -     Hemoglobin A1C; Future    Type 2 diabetes mellitus without complication, without long-term current use of insulin ([facility])  -     glimepiride (AMARYL) 4 mg tablet; Take 0 5 tablets (2 mg total) by mouth daily with breakfast    Fungal dermatosis  -     Discontinue: nystatin (MYCOSTATIN) cream; Apply topically 2 (two) times a day  -     nystatin (MYCOSTATIN) cream; Apply topically 2 (two) times a day    Other orders  -     lisinopril-hydrochlorothiazide (PRINZIDE,ZESTORETIC) 20-12 5 MG per tablet; Take 1 tablet by mouth daily        No problem-specific Assessment & Plan notes found for this encounter  BMI Counseling: Body mass index is 32 52 kg/m²  The BMI is above normal  Nutrition recommendations include decreasing portion sizes  Exercise recommendations include exercising 3-5 times per week  No pharmacotherapy was ordered  Subjective:  The patient is noted to have issues with an A1c     Patient ID: Zachary Clayton is a 77 y o  male  We will work on increased activity  The patient notes no issues at this time  He is tolerating the current dose of Pravastatin and was told at the [place] to take only at nightime  I would prefer he take it once a day if needed in the AM   The patient wants to restart the Glimepiride  Diabetes  He presents for his follow-up diabetic visit  He has type 2 diabetes mellitus  There are no hypoglycemic associated symptoms  Pertinent negatives for hypoglycemia include no nervousness/anxiousness  Pertinent negatives for diabetes include no chest pain and no fatigue  Risk factors for coronary artery disease include male sex, hypertension, diabetes mellitus and dyslipidemia  Current diabetic treatment includes oral agent (dual therapy)  His home blood glucose trend is decreasing steadily  An ACE inhibitor/angiotensin II receptor blocker is being taken  He sees a podiatrist Eye exam is current  Hypertension  This is a chronic problem  The current episode started more than 1 year ago  The problem is unchanged  The problem is controlled  Pertinent negatives include no chest pain, palpitations or shortness of breath  There are no associated agents to hypertension  Past treatments include diuretics and ACE inhibitors  The current treatment provides significant improvement  Compliance problems include medication side effects  The following portions of the patient's history were reviewed and updated as appropriate:   He has a past medical history of Arthritis of knee, right, History of gastroesophageal reflux (GERD), Hypercholesterolemia, Morbid (severe) obesity due to excess calories ([address] ), Papular rash, Pes anserine bursitis, Sebaceous cyst, Testicular hypogonadism, and Tick bite ,  does not have any pertinent problems on file  ,   has a past surgical history that includes Wrist fusion (Bilateral); Elbow surgery (Right);  Colonoscopy; and Upper gastrointestinal endoscopy  ,  family history includes Diabetes type II in his mother; Heart disease in his mother; No Known Problems in his father; Rheumatic fever in his mother  ,   reports that he quit smoking about 34 years ago  His smoking use included cigarettes  He has a 20 00 pack-year smoking history  He has never used smokeless tobacco  He reports current alcohol use  He reports that he does not use drugs  ,  is allergic to tape [medical tape] and pollen extract     Current Outpatient Medications   Medication Sig Dispense Refill    aspirin 81 MG tablet Take 81 mg by mouth once        glucose blood (ONE TOUCH ULTRA TEST) test strip 1 Squirt by In Vitro route daily      glucose blood (OneTouch Ultra) test strip TEST SUGAR TWICE DAILY 200 each 1    linaGLIPtin 5 MG TABS Take 5 mg by mouth daily 90 tablet 1    lisinopril-hydrochlorothiazide (PRINZIDE,ZESTORETIC) 20-12 5 MG per tablet Take 1 tablet by mouth daily      Omega-3 Fatty Acids (FISH OIL) 1,000 mg Take 1,000 mg by mouth daily      pantoprazole (PROTONIX) 20 mg tablet Take 1 tablet (20 mg total) by mouth daily 90 tablet 1    pravastatin (PRAVACHOL) 10 mg tablet TAKE 1 TABLET BY MOUTH AT BEDTIME 90 tablet 1    Triamcinolone Acetonide (NASACORT ALLERGY 24HR) 55 MCG/ACT AERO into each nostril      glimepiride (AMARYL) 4 mg tablet Take 0 5 tablets (2 mg total) by mouth daily with breakfast 45 tablet 1    nystatin (MYCOSTATIN) cream Apply topically 2 (two) times a day 30 g 0     No current facility-administered medications for this visit  Review of Systems   Constitutional: Negative for chills, fatigue and fever  HENT: Negative  Respiratory: Negative for cough, chest tightness and shortness of breath  Cardiovascular: Negative for chest pain and palpitations  Gastrointestinal: Negative for abdominal pain, constipation, diarrhea, nausea and vomiting  Genitourinary: Negative      Musculoskeletal: Negative for back pain and myalgias  Skin: Positive for rash  Neurological: Negative  Psychiatric/Behavioral: Negative for dysphoric mood  The patient is not nervous/anxious  Objective:  Vitals:    02/09/21 0855 02/09/21 0937   BP: 144/82 128/84   BP Location: Left arm    Patient Position: Sitting    Cuff Size: Standard    Pulse: 65    Resp: 14    Temp: (!) 97 4 °F (36 3 °C)    SpO2: 97%    Weight: 99 9 kg (220 lb 3 2 oz)    Height: 5' 9" (1 753 m)      Body mass index is 32 52 kg/m²  Physical Exam  Vitals signs and nursing note reviewed  Constitutional:       Appearance: He is well-developed  HENT:      Head: Normocephalic and atraumatic  Eyes:      Pupils: Pupils are equal, round, and reactive to light  Neck:      Musculoskeletal: Normal range of motion and neck supple  Cardiovascular:      Rate and Rhythm: Normal rate and regular rhythm  Heart sounds: Normal heart sounds  No murmur  Pulmonary:      Effort: Pulmonary effort is normal       Breath sounds: Normal breath sounds  No stridor  No rales  Abdominal:      General: Bowel sounds are normal  There is no distension  Palpations: Abdomen is soft  Tenderness: There is no abdominal tenderness  Musculoskeletal: Normal range of motion  General: No deformity  Skin:     General: Skin is warm and dry  Findings: Erythema and rash present  Neurological:      Mental Status: He is alert and oriented to person, place, and time            PHQ-9 Depression Screening    PHQ-9:   Frequency of the following problems over the past two weeks:      Little interest or pleasure in doing things: 0 - not at all  Feeling down, depressed, or hopeless: 0 - not at all  PHQ-2 Score: 0

## 2021-02-22 ENCOUNTER — TELEPHONE (OUTPATIENT)
Dept: INTERNAL MEDICINE CLINIC | Facility: CLINIC | Age: 76
End: 2021-02-22

## 2021-02-22 NOTE — TELEPHONE ENCOUNTER
He called regarding his blood sugars  The new medication -glimepiride 4 mg is not helping  He said his sugars have not come down-they are between 200-220  He feels ok

## 2021-03-04 LAB
LEFT EYE DIABETIC RETINOPATHY: NORMAL
RIGHT EYE DIABETIC RETINOPATHY: NORMAL

## 2021-04-05 DIAGNOSIS — E11.9 TYPE 2 DIABETES MELLITUS WITHOUT COMPLICATION, WITHOUT LONG-TERM CURRENT USE OF INSULIN (HCC): ICD-10-CM

## 2021-04-05 RX ORDER — GLIMEPIRIDE 4 MG/1
TABLET ORAL
Qty: 90 TABLET | Refills: 1 | Status: SHIPPED | OUTPATIENT
Start: 2021-04-05 | End: 2021-05-10 | Stop reason: SDUPTHER

## 2021-04-11 DIAGNOSIS — E11.9 TYPE 2 DIABETES MELLITUS WITHOUT COMPLICATION, WITHOUT LONG-TERM CURRENT USE OF INSULIN (HCC): ICD-10-CM

## 2021-04-12 RX ORDER — BLOOD SUGAR DIAGNOSTIC
STRIP MISCELLANEOUS
Qty: 200 EACH | Refills: 1 | Status: SHIPPED | OUTPATIENT
Start: 2021-04-12

## 2021-05-03 ENCOUNTER — TRANSCRIBE ORDERS (OUTPATIENT)
Dept: LAB | Facility: CLINIC | Age: 76
End: 2021-05-03

## 2021-05-03 ENCOUNTER — APPOINTMENT (OUTPATIENT)
Dept: LAB | Facility: CLINIC | Age: 76
End: 2021-05-03
Payer: COMMERCIAL

## 2021-05-03 DIAGNOSIS — E11.8 TYPE 2 DIABETES MELLITUS WITH COMPLICATION, WITHOUT LONG-TERM CURRENT USE OF INSULIN (HCC): ICD-10-CM

## 2021-05-03 PROCEDURE — 83036 HEMOGLOBIN GLYCOSYLATED A1C: CPT

## 2021-05-03 PROCEDURE — 36415 COLL VENOUS BLD VENIPUNCTURE: CPT

## 2021-05-04 LAB
EST. AVERAGE GLUCOSE BLD GHB EST-MCNC: 214 MG/DL
HBA1C MFR BLD: 9.1 %

## 2021-05-04 PROCEDURE — 3046F HEMOGLOBIN A1C LEVEL >9.0%: CPT | Performed by: INTERNAL MEDICINE

## 2021-05-10 ENCOUNTER — OFFICE VISIT (OUTPATIENT)
Dept: INTERNAL MEDICINE CLINIC | Facility: CLINIC | Age: 76
End: 2021-05-10
Payer: COMMERCIAL

## 2021-05-10 VITALS
BODY MASS INDEX: 33.18 KG/M2 | SYSTOLIC BLOOD PRESSURE: 140 MMHG | OXYGEN SATURATION: 97 % | WEIGHT: 224 LBS | HEIGHT: 69 IN | DIASTOLIC BLOOD PRESSURE: 82 MMHG | RESPIRATION RATE: 14 BRPM | TEMPERATURE: 98.1 F | HEART RATE: 66 BPM

## 2021-05-10 DIAGNOSIS — E78.00 HYPERCHOLESTEROLEMIA: ICD-10-CM

## 2021-05-10 DIAGNOSIS — I10 ESSENTIAL HYPERTENSION: ICD-10-CM

## 2021-05-10 DIAGNOSIS — E11.9 DIABETIC EYE EXAM (HCC): ICD-10-CM

## 2021-05-10 DIAGNOSIS — E11.9 TYPE 2 DIABETES MELLITUS WITHOUT COMPLICATION, WITHOUT LONG-TERM CURRENT USE OF INSULIN (HCC): ICD-10-CM

## 2021-05-10 DIAGNOSIS — Z01.00 DIABETIC EYE EXAM (HCC): ICD-10-CM

## 2021-05-10 DIAGNOSIS — Z00.00 MEDICARE ANNUAL WELLNESS VISIT, SUBSEQUENT: Primary | ICD-10-CM

## 2021-05-10 PROCEDURE — 99214 OFFICE O/P EST MOD 30 MIN: CPT | Performed by: INTERNAL MEDICINE

## 2021-05-10 PROCEDURE — 3077F SYST BP >= 140 MM HG: CPT | Performed by: INTERNAL MEDICINE

## 2021-05-10 PROCEDURE — 3725F SCREEN DEPRESSION PERFORMED: CPT | Performed by: INTERNAL MEDICINE

## 2021-05-10 PROCEDURE — 1036F TOBACCO NON-USER: CPT | Performed by: INTERNAL MEDICINE

## 2021-05-10 PROCEDURE — 1170F FXNL STATUS ASSESSED: CPT | Performed by: INTERNAL MEDICINE

## 2021-05-10 PROCEDURE — 1160F RVW MEDS BY RX/DR IN RCRD: CPT | Performed by: INTERNAL MEDICINE

## 2021-05-10 PROCEDURE — 3008F BODY MASS INDEX DOCD: CPT | Performed by: INTERNAL MEDICINE

## 2021-05-10 PROCEDURE — 1125F AMNT PAIN NOTED PAIN PRSNT: CPT | Performed by: INTERNAL MEDICINE

## 2021-05-10 PROCEDURE — 3288F FALL RISK ASSESSMENT DOCD: CPT | Performed by: INTERNAL MEDICINE

## 2021-05-10 PROCEDURE — G0439 PPPS, SUBSEQ VISIT: HCPCS | Performed by: INTERNAL MEDICINE

## 2021-05-10 PROCEDURE — 3079F DIAST BP 80-89 MM HG: CPT | Performed by: INTERNAL MEDICINE

## 2021-05-10 PROCEDURE — 1101F PT FALLS ASSESS-DOCD LE1/YR: CPT | Performed by: INTERNAL MEDICINE

## 2021-05-10 RX ORDER — ROSUVASTATIN CALCIUM 5 MG/1
5 TABLET, COATED ORAL DAILY
Qty: 90 TABLET | Refills: 1 | Status: SHIPPED | OUTPATIENT
Start: 2021-05-10 | End: 2021-09-11

## 2021-05-10 RX ORDER — CETIRIZINE HYDROCHLORIDE 10 MG/1
10 TABLET ORAL DAILY
COMMUNITY
End: 2022-04-05

## 2021-05-10 RX ORDER — GLIMEPIRIDE 4 MG/1
8 TABLET ORAL DAILY
Qty: 180 TABLET | Refills: 1 | Status: SHIPPED | OUTPATIENT
Start: 2021-05-10 | End: 2021-09-11 | Stop reason: SDUPTHER

## 2021-05-10 NOTE — PROGRESS NOTES
Diabetic Foot Exam    Patient's shoes and socks removed  Right Foot/Ankle   Right Foot Inspection  Skin Exam: skin normal, skin intact, dry skin, callus and callus no warmth, no erythema, no maceration, no abnormal color, no pre-ulcer and no ulcer                          Toe Exam: ROM and strength within normal limits no right toe deformity  Sensory       Monofilament testing: intact  Vascular  Capillary refills: < 3 seconds  The right DP pulse is 2+  The right PT pulse is 2+  Left Foot/Ankle  Left Foot Inspection  Skin Exam: skin normal, skin intact, dry skin and callusno warmth, no erythema, no maceration, normal color, no pre-ulcer and no ulcer                         Toe Exam: ROM and strength within normal limitsno left toe deformity                   Sensory       Monofilament: intact  Vascular  Capillary refills: < 3 seconds  The left DP pulse is 2+  The left PT pulse is 2+  Assign Risk Category:  No deformity present; No loss of protective sensation;  No weak pulses       Risk: 0

## 2021-05-10 NOTE — PATIENT INSTRUCTIONS

## 2021-05-10 NOTE — PROGRESS NOTES
Assessment and Plan:     Problem List Items Addressed This Visit     None      Visit Diagnoses     Diabetic eye exam [facility])    -  Primary           Preventive health issues were discussed with patient, and age appropriate screening tests were ordered as noted in patient's After Visit Summary  Personalized health advice and appropriate referrals for health education or preventive services given if needed, as noted in patient's After Visit Summary  History of Present Illness:     Patient presents for Medicare Annual Wellness visit    Patient Care Team:  Yakelin Ceja DO as PCP - General (Internal Medicine)  Yakelin Ceja DO as PCP - [address] [place], [place] [id number] [facility] ([facility])     Problem List:     Patient Active Problem List   Diagnosis    Type 2 diabetes mellitus with complication, without long-term current use of insulin ([address] )    Essential hypertension    Allergic rhinitis    Arthritis of knee, right    Enlarged prostate with lower urinary tract symptoms (LUTS)    Gastroesophageal reflux disease without esophagitis    Fungal dermatosis    Hypercholesterolemia    Obesity (BMI 30 0-34  9)    Peyronie's disease    Encounter for hepatitis C screening test for low risk patient    Polycythemia    Colon polyps    H  pylori infection      Past Medical and Surgical History:     Past Medical History:   Diagnosis Date    Arthritis of knee, right     LAST ASSESSED: 2/7/17    History of gastroesophageal reflux (GERD)     LAST ASSESSED: 5/9/17    Hypercholesterolemia     LAST ASSESSED: AND RESOLVED: 1/9/18    Morbid (severe) obesity due to excess calories ([address] )     LAST ASSESSED: 5/10/16    Papular rash     RESOLVED: 2/1/16    Pes anserine bursitis     LAST ASSESSED: 11/29/16    Sebaceous cyst     LAST ASSESSED: 10/21/14    Testicular hypogonadism     LAST ASSESSED: 10/30/13    Tick bite     LAST ASSESSED: 11/14/16     Past Surgical History:   Procedure Laterality Date    COLONOSCOPY      ELBOW SURGERY Right     UPPER GASTROINTESTINAL ENDOSCOPY      WRIST FUSION Bilateral       Family History:     Family History   Problem Relation Age of Onset    Diabetes type II Mother         MELLITUS    Rheumatic fever Mother     Heart disease Mother     No Known Problems Father       Social History:     E-Cigarette/Vaping    E-Cigarette Use Never User      E-Cigarette/Vaping Substances    Nicotine No     THC No     CBD No     Flavoring No     Other No     Unknown No      Social History     Socioeconomic History    Marital status: /Civil Union     Spouse name: None    Number of children: None    Years of education: None    Highest education level: None   Occupational History    Occupation: RETIRED   Social Needs    Financial resource strain: None    Food insecurity     Worry: None     Inability: None    Transportation needs     Medical: None     Non-medical: None   Tobacco Use    Smoking status: Former Smoker     Packs/day: 1 00     Years: 20 00     Pack years: 20 00     Types: Cigarettes     Quit date: 3/10/1986     Years since quittin 1    Smokeless tobacco: Never Used   Substance and Sexual Activity    Alcohol use: Yes     Comment: Sravanthi     Drug use: No    Sexual activity: None   Lifestyle    Physical activity     Days per week: None     Minutes per session: None    Stress: None   Relationships    Social connections     Talks on phone: None     Gets together: None     Attends [employer] service: None     Active member of club or organization: None     Attends meetings of clubs or organizations: None     Relationship status: None    Intimate partner violence     Fear of current or ex partner: None     Emotionally abused: None     Physically abused: None     Forced sexual activity: None   Other Topics Concern    None   Social History Narrative    Retired        Lives with spouse       Medications and Allergies:     Current Outpatient Medications   Medication Sig Dispense Refill    aspirin 81 MG tablet Take 81 mg by mouth once        cetirizine (ZyrTEC) 10 mg tablet Take 10 mg by mouth daily      glimepiride (AMARYL) 4 mg tablet TAKE ONE TABLET BY MOUTH EVERY DAY WITH BREAKFAST (Patient taking differently: TAKE 2 TABLETS ONCE DAILY) 90 tablet 1    glucose blood (ONE TOUCH ULTRA TEST) test strip 1 Squirt by In Vitro route daily      glucose blood (OneTouch Ultra) test strip USE TO TEST BLOOD SUGAR TWICE DAILY 200 each 1    linaGLIPtin 5 MG TABS Take 5 mg by mouth daily (Patient taking differently: Take 5 mg by mouth 2 (two) times a day ) 90 tablet 1    lisinopril-hydrochlorothiazide (PRINZIDE,ZESTORETIC) 20-12 5 MG per tablet Take 1 tablet by mouth daily      pantoprazole (PROTONIX) 20 mg tablet Take 1 tablet (20 mg total) by mouth daily 90 tablet 1    Triamcinolone Acetonide (NASACORT ALLERGY 24HR) 55 MCG/ACT AERO into each nostril      nystatin (MYCOSTATIN) cream Apply topically 2 (two) times a day (Patient not taking: Reported on 5/10/2021) 30 g 0    Omega-3 Fatty Acids (FISH OIL) 1,000 mg Take 1,000 mg by mouth daily      pravastatin (PRAVACHOL) 10 mg tablet TAKE 1 TABLET BY MOUTH AT BEDTIME (Patient not taking: Reported on 5/10/2021) 90 tablet 1     No current facility-administered medications for this visit        Allergies   Allergen Reactions    Tape [Medical Tape]      BANDAID ADHESIVE    Pollen Extract Allergic Rhinitis      Immunizations:     Immunization History   Administered Date(s) Administered    INFLUENZA 1945, 01/08/2003, 03/01/2003, 09/23/2003, 03/02/2006, 10/23/2007, 10/27/2008, 11/25/2009, 10/01/2010, 09/26/2011, 10/31/2012, 10/22/2013, 10/03/2014, 10/01/2017, 10/01/2018, 10/02/2019    Influenza Quadrivalent Preservative Free 3 years and older IM 10/06/2020    Influenza Split High Dose Preservative Free IM 10/03/2015    Influenza, high dose seasonal 0 7 mL 10/02/2019    Influenza, injectable, quadrivalent, preservative free 0 5 mL 09/25/2018, 10/05/2019, 10/06/2020    Influenza, seasonal, injectable 10/01/2017    Influenza, seasonal, injectable, preservative free 10/01/2014, 10/04/2016, 09/26/2017    Pneumococcal 03/01/2003, 01/15/2013    Pneumococcal Conjugate 13-Valent 01/27/2016    Pneumococcal Polysaccharide PPV23 01/15/2013    SARS-CoV-2 / COVID-19 mRNA IM (Moderna) 02/08/2021, 03/08/2021    Tdap 01/21/2015    Zoster 01/15/2013      Health Maintenance:         Topic Date Due    Colorectal Cancer Screening  08/27/2030    Hepatitis C Screening  Completed     There are no preventive care reminders to display for this patient  Medicare Health Risk Assessment:     /82 (BP Location: Right arm, Patient Position: Sitting, Cuff Size: Large)   Pulse 66   Temp 98 1 °F (36 7 °C)   Resp 14   Ht 5' 9" (1 753 m)   Wt 102 kg (224 lb)   SpO2 97%   BMI 33 08 kg/m²      Sánchez Sevilla is here for his Subsequent Wellness visit  Last Medicare Wellness visit information reviewed, patient interviewed and updates made to the record today  Health Risk Assessment:   Patient rates overall health as very good  Patient feels that their physical health rating is same  Patient is satisfied with their life  Eyesight was rated as same  Hearing was rated as same  Patient feels that their emotional and mental health rating is same  Patients states they are never, rarely angry  Patient states they are sometimes unusually tired/fatigued  Pain experienced in the last 7 days has been none  Patient states that he has experienced no weight loss or gain in last 6 months  Depression Screening:   PHQ-2 Score: 0      Fall Risk Screening: In the past year, patient has experienced: no history of falling in past year      Home Safety:  Patient does not have trouble with stairs inside or outside of their home  Patient has working smoke alarms and has working carbon monoxide detector  Home safety hazards include: none  Nutrition:   Current diet is Regular  Medications:   Patient is currently taking over-the-counter supplements  OTC medications include: see medication list  Patient is able to manage medications  Activities of Daily Living (ADLs)/Instrumental Activities of Daily Living (IADLs):   Walk and transfer into and out of bed and chair?: Yes  Dress and groom yourself?: Yes    Bathe or shower yourself?: Yes    Feed yourself? Yes  Do your laundry/housekeeping?: Yes  Manage your money, pay your bills and track your expenses?: Yes  Make your own meals?: Yes    Do your own shopping?: Yes    Previous Hospitalizations:   Any hospitalizations or ED visits within the last 12 months?: No      Advance Care Planning:   Living will: Yes    Durable POA for healthcare: Yes    Advanced directive: Yes    Advanced directive counseling given: No    Five wishes given: No    Patient declined ACP directive: No    End of Life Decisions reviewed with patient: Yes    Provider agrees with end of life decisions: Yes      Cognitive Screening:   Provider or family/friend/caregiver concerned regarding cognition?: No    PREVENTIVE SCREENINGS      Cardiovascular Screening:    General: Screening Not Indicated and History Lipid Disorder      Diabetes Screening:     General: Screening Not Indicated and History Diabetes      Colorectal Cancer Screening:     General: Screening Current      Prostate Cancer Screening:    General: Screening Not Indicated      Osteoporosis Screening:    General: Screening Not Indicated      Abdominal Aortic Aneurysm (AAA) Screening:    Risk factors include: age between 73-69 yo and tobacco use        General: Screening Current      Lung Cancer Screening:     General: Screening Not Indicated      Hepatitis C Screening:    General: Screening Current    Screening, Brief Intervention, and Referral to Treatment (SBIRT)    Screening  Typical number of drinks in a day: 0  Typical number of drinks in a week: 0  Interpretation: Low risk drinking behavior      Single Item Drug Screening:  How often have you used an illegal drug (including marijuana) or a prescription medication for non-medical reasons in the past year? never    Single Item Drug Screen Score: 0  Interpretation: Negative screen for possible drug use disorder      William Storm,

## 2021-05-10 NOTE — PROGRESS NOTES
Assessment/Plan:    Problem List Items Addressed This Visit        Cardiovascular and Mediastinum    Essential hypertension       Other    Hypercholesterolemia    Relevant Medications    rosuvastatin (CRESTOR) 5 mg tablet      Other Visit Diagnoses     Medicare annual wellness visit, subsequent    -  Primary    Diabetic eye exam ([place] [address] )        Relevant Orders    Ambulatory referral to Ophthalmology    Type 2 diabetes mellitus without complication, without long-term current use of insulin ([facility])        Relevant Medications    glimepiride (AMARYL) 4 mg tablet    linaGLIPtin 5 MG TABS    canagliflozin (Invokana) 100 mg           Diagnoses and all orders for this visit:    Medicare annual wellness visit, subsequent    Diabetic eye exam ([facility] )  -     Ambulatory referral to Ophthalmology    Type 2 diabetes mellitus without complication, without long-term current use of insulin ([place] [address] )  -     glimepiride (AMARYL) 4 mg tablet; Take 2 tablets (8 mg total) by mouth daily TAKE 2 TABLETS ONCE DAILY  -     linaGLIPtin 5 MG TABS; Take 5 mg by mouth daily  -     canagliflozin (Invokana) 100 mg; Take 1 tablet (100 mg total) by mouth daily before breakfast    Hypercholesterolemia  -     rosuvastatin (CRESTOR) 5 mg tablet; Take 1 tablet (5 mg total) by mouth daily    Essential hypertension    Other orders  -     cetirizine (ZyrTEC) 10 mg tablet; Take 10 mg by mouth daily        No problem-specific Assessment & Plan notes found for this encounter  Subjective:      Patient ID: Nagi Goodson is a 77 y o  male  Mildly hypertensive  The patient is not interested in BP medication changes and we will continues to follow  DM uncontrolled and now taking 2 lanigliptin 5 mg  We change this to one lanigliptin and start Invokana  The patient states that he is intolerant of the Rosuvastatin  Hypertension  This is a chronic problem  The current episode started today  The problem is unchanged  The problem is controlled   Pertinent negatives include no chest pain, palpitations or shortness of breath  There are no associated agents to hypertension  There are no known risk factors for coronary artery disease  Past treatments include ACE inhibitors and diuretics  The current treatment provides no improvement  There are no compliance problems  Diabetes  He presents for his follow-up diabetic visit  He has type 2 diabetes mellitus  His disease course has been stable  There are no hypoglycemic associated symptoms  Pertinent negatives for hypoglycemia include no nervousness/anxiousness  There are no diabetic associated symptoms  Pertinent negatives for diabetes include no chest pain and no fatigue  There are no hypoglycemic complications  There are no diabetic complications  Risk factors for coronary artery disease include obesity, male sex, hypertension and dyslipidemia  Current diabetic treatment includes oral agent (triple therapy)  He is compliant with treatment all of the time  He participates in exercise daily  His home blood glucose trend is decreasing steadily  An ACE inhibitor/angiotensin II receptor blocker is being taken  He sees a podiatrist Eye exam is current  The following portions of the patient's history were reviewed and updated as appropriate:   He has a past medical history of Arthritis of knee, right, History of gastroesophageal reflux (GERD), Hypercholesterolemia, Morbid (severe) obesity due to excess calories ([address] ), Papular rash, Pes anserine bursitis, Sebaceous cyst, Testicular hypogonadism, and Tick bite ,  does not have any pertinent problems on file  ,   has a past surgical history that includes Wrist fusion (Bilateral); Elbow surgery (Right); Colonoscopy; and Upper gastrointestinal endoscopy  ,  family history includes Diabetes type II in his mother; Heart disease in his mother; No Known Problems in his father; Rheumatic fever in his mother  ,   reports that he quit smoking about 35 years ago   His smoking use included cigarettes  He has a 20 00 pack-year smoking history  He has never used smokeless tobacco  He reports current alcohol use  He reports that he does not use drugs  ,  is allergic to tape [medical tape] and pollen extract     Current Outpatient Medications   Medication Sig Dispense Refill    aspirin 81 MG tablet Take 81 mg by mouth once        cetirizine (ZyrTEC) 10 mg tablet Take 10 mg by mouth daily      glimepiride (AMARYL) 4 mg tablet Take 2 tablets (8 mg total) by mouth daily TAKE 2 TABLETS ONCE DAILY 180 tablet 1    glucose blood (ONE TOUCH ULTRA TEST) test strip 1 Squirt by In Vitro route daily      glucose blood (OneTouch Ultra) test strip USE TO TEST BLOOD SUGAR TWICE DAILY 200 each 1    linaGLIPtin 5 MG TABS Take 5 mg by mouth daily 90 tablet 1    lisinopril-hydrochlorothiazide (PRINZIDE,ZESTORETIC) 20-12 5 MG per tablet Take 1 tablet by mouth daily      pantoprazole (PROTONIX) 20 mg tablet Take 1 tablet (20 mg total) by mouth daily 90 tablet 1    Triamcinolone Acetonide (NASACORT ALLERGY 24HR) 55 MCG/ACT AERO into each nostril      canagliflozin (Invokana) 100 mg Take 1 tablet (100 mg total) by mouth daily before breakfast 90 tablet 1    nystatin (MYCOSTATIN) cream Apply topically 2 (two) times a day (Patient not taking: Reported on 5/10/2021) 30 g 0    Omega-3 Fatty Acids (FISH OIL) 1,000 mg Take 1,000 mg by mouth daily      rosuvastatin (CRESTOR) 5 mg tablet Take 1 tablet (5 mg total) by mouth daily 90 tablet 1     No current facility-administered medications for this visit  Review of Systems   Constitutional: Negative for chills, fatigue and fever  HENT: Negative  Respiratory: Negative for cough, chest tightness and shortness of breath  Cardiovascular: Negative for chest pain and palpitations  Gastrointestinal: Negative for abdominal pain, constipation, diarrhea, nausea and vomiting  Genitourinary: Negative  Musculoskeletal: Negative for back pain and myalgias  Skin: Negative  Neurological: Negative  Psychiatric/Behavioral: Negative for dysphoric mood  The patient is not nervous/anxious  Objective:  Vitals:    05/10/21 0838   BP: 140/82   BP Location: Right arm   Patient Position: Sitting   Cuff Size: Large   Pulse: 66   Resp: 14   Temp: 98 1 °F (36 7 °C)   SpO2: 97%   Weight: 102 kg (224 lb)   Height: 5' 9" (1 753 m)     Body mass index is 33 08 kg/m²  Physical Exam  Vitals signs and nursing note reviewed  Constitutional:       Appearance: He is well-developed  HENT:      Head: Normocephalic and atraumatic  Eyes:      Pupils: Pupils are equal, round, and reactive to light  Neck:      Musculoskeletal: Normal range of motion and neck supple  Cardiovascular:      Rate and Rhythm: Normal rate and regular rhythm  Heart sounds: Normal heart sounds  No murmur  Pulmonary:      Effort: Pulmonary effort is normal       Breath sounds: Normal breath sounds  No stridor  No rales  Abdominal:      General: Bowel sounds are normal  There is no distension  Palpations: Abdomen is soft  Tenderness: There is no abdominal tenderness  Musculoskeletal: Normal range of motion  General: No deformity  Skin:     General: Skin is warm and dry  Neurological:      Mental Status: He is alert and oriented to person, place, and time            PHQ-9 Depression Screening    PHQ-9:   Frequency of the following problems over the past two weeks:      Little interest or pleasure in doing things: 0 - not at all  Feeling down, depressed, or hopeless: 0 - not at all  PHQ-2 Score: 0

## 2021-05-12 ENCOUNTER — TELEPHONE (OUTPATIENT)
Dept: ADMINISTRATIVE | Facility: OTHER | Age: 76
End: 2021-05-12

## 2021-05-12 NOTE — TELEPHONE ENCOUNTER
----- Message from Angela Monge sent at 5/12/2021  8:07 AM EDT -----  05/12/21 8:07 AM    Michelle, our patient Soraya Knapp has had Diabetic Eye Exam completed/performed  Please assist in updating the patient chart by pulling the document from the Media Tab  The date of service is 3/4/2021       Thank you,  Little Canales  [facility]

## 2021-05-12 NOTE — TELEPHONE ENCOUNTER
Upon review of the In Basket request we were able to locate, review, and update the patient chart as requested for Diabetic Eye Exam     Any additional questions or concerns should be emailed to the Practice Liaisons via [email or web address] email, please do not reply via In Basket      Thank you  Castillo Poon

## 2021-05-17 ENCOUNTER — VBI (OUTPATIENT)
Dept: ADMINISTRATIVE | Facility: OTHER | Age: 76
End: 2021-05-17

## 2021-05-17 DIAGNOSIS — A04.8 H. PYLORI INFECTION: ICD-10-CM

## 2021-05-17 RX ORDER — PANTOPRAZOLE SODIUM 20 MG/1
TABLET, DELAYED RELEASE ORAL
Qty: 90 TABLET | Refills: 1 | Status: SHIPPED | OUTPATIENT
Start: 2021-05-17 | End: 2021-11-01

## 2021-05-19 ENCOUNTER — TELEPHONE (OUTPATIENT)
Dept: INTERNAL MEDICINE CLINIC | Facility: CLINIC | Age: 76
End: 2021-05-19

## 2021-05-19 NOTE — TELEPHONE ENCOUNTER
He asked if his sugars keep going down-do you want to cut something out or make a medication change      thanks

## 2021-05-19 NOTE — TELEPHONE ENCOUNTER
108 is good and we should continue to follow this to see how it does  Call with glucometer reading every 3 business days

## 2021-07-06 DIAGNOSIS — I10 ESSENTIAL HYPERTENSION: Primary | ICD-10-CM

## 2021-07-06 RX ORDER — LISINOPRIL AND HYDROCHLOROTHIAZIDE 20; 12.5 MG/1; MG/1
1 TABLET ORAL DAILY
Qty: 90 TABLET | Refills: 1 | Status: SHIPPED | OUTPATIENT
Start: 2021-07-06 | End: 2021-09-11

## 2021-08-11 ENCOUNTER — VBI (OUTPATIENT)
Dept: ADMINISTRATIVE | Facility: OTHER | Age: 76
End: 2021-08-11

## 2021-09-11 ENCOUNTER — OFFICE VISIT (OUTPATIENT)
Dept: INTERNAL MEDICINE CLINIC | Facility: CLINIC | Age: 76
End: 2021-09-11
Payer: COMMERCIAL

## 2021-09-11 VITALS
RESPIRATION RATE: 16 BRPM | DIASTOLIC BLOOD PRESSURE: 76 MMHG | OXYGEN SATURATION: 98 % | BODY MASS INDEX: 31.67 KG/M2 | SYSTOLIC BLOOD PRESSURE: 132 MMHG | TEMPERATURE: 97 F | WEIGHT: 213.8 LBS | HEIGHT: 69 IN | HEART RATE: 69 BPM

## 2021-09-11 DIAGNOSIS — I10 ESSENTIAL HYPERTENSION: ICD-10-CM

## 2021-09-11 DIAGNOSIS — E78.00 HYPERCHOLESTEROLEMIA: ICD-10-CM

## 2021-09-11 DIAGNOSIS — E11.9 TYPE 2 DIABETES MELLITUS WITHOUT COMPLICATION, WITHOUT LONG-TERM CURRENT USE OF INSULIN (HCC): ICD-10-CM

## 2021-09-11 DIAGNOSIS — E11.8 TYPE 2 DIABETES MELLITUS WITH COMPLICATION, WITHOUT LONG-TERM CURRENT USE OF INSULIN (HCC): Primary | ICD-10-CM

## 2021-09-11 DIAGNOSIS — K21.9 GASTROESOPHAGEAL REFLUX DISEASE WITHOUT ESOPHAGITIS: ICD-10-CM

## 2021-09-11 DIAGNOSIS — E66.9 OBESITY (BMI 30.0-34.9): ICD-10-CM

## 2021-09-11 LAB — SL AMB POCT HEMOGLOBIN AIC: 7.1 (ref ?–6.5)

## 2021-09-11 PROCEDURE — 3051F HG A1C>EQUAL 7.0%<8.0%: CPT | Performed by: INTERNAL MEDICINE

## 2021-09-11 PROCEDURE — 99214 OFFICE O/P EST MOD 30 MIN: CPT | Performed by: INTERNAL MEDICINE

## 2021-09-11 PROCEDURE — 1160F RVW MEDS BY RX/DR IN RCRD: CPT | Performed by: INTERNAL MEDICINE

## 2021-09-11 PROCEDURE — 3075F SYST BP GE 130 - 139MM HG: CPT | Performed by: INTERNAL MEDICINE

## 2021-09-11 PROCEDURE — 3008F BODY MASS INDEX DOCD: CPT | Performed by: INTERNAL MEDICINE

## 2021-09-11 PROCEDURE — 1036F TOBACCO NON-USER: CPT | Performed by: INTERNAL MEDICINE

## 2021-09-11 PROCEDURE — 83036 HEMOGLOBIN GLYCOSYLATED A1C: CPT | Performed by: INTERNAL MEDICINE

## 2021-09-11 PROCEDURE — 3078F DIAST BP <80 MM HG: CPT | Performed by: INTERNAL MEDICINE

## 2021-09-11 RX ORDER — GLIMEPIRIDE 4 MG/1
4 TABLET ORAL DAILY
Qty: 90 TABLET | Refills: 1 | Status: SHIPPED | COMMUNITY
Start: 2021-09-11 | End: 2021-11-12

## 2021-09-11 RX ORDER — LISINOPRIL 20 MG/1
20 TABLET ORAL DAILY
Qty: 90 TABLET | Refills: 1 | Status: SHIPPED | COMMUNITY
Start: 2021-09-11 | End: 2021-09-21 | Stop reason: SDUPTHER

## 2021-09-11 RX ORDER — LISINOPRIL 10 MG/1
20 TABLET ORAL DAILY
Qty: 90 TABLET | Refills: 1 | Status: SHIPPED | OUTPATIENT
Start: 2021-09-11 | End: 2021-09-11 | Stop reason: SDUPTHER

## 2021-09-11 NOTE — PROGRESS NOTES
Assessment/Plan:    Problem List Items Addressed This Visit        Endocrine    Type 2 diabetes mellitus with complication, without long-term current use of insulin (HCC) - Primary    Relevant Medications    glimepiride (AMARYL) 4 mg tablet    lisinopril (ZESTRIL) 20 mg tablet    Other Relevant Orders    POCT hemoglobin A1c (Completed)       Cardiovascular and Mediastinum    Essential hypertension    Relevant Medications    lisinopril (ZESTRIL) 20 mg tablet      Other Visit Diagnoses     Type 2 diabetes mellitus without complication, without long-term current use of insulin (HCC)        Relevant Medications    glimepiride (AMARYL) 4 mg tablet           Diagnoses and all orders for this visit:    Type 2 diabetes mellitus with complication, without long-term current use of insulin (HCC)  -     POCT hemoglobin A1c  -     Discontinue: lisinopril (ZESTRIL) 10 mg tablet; Take 2 tablets (20 mg total) by mouth daily  -     lisinopril (ZESTRIL) 20 mg tablet; Take 1 tablet (20 mg total) by mouth daily    Essential hypertension  -     Discontinue: lisinopril (ZESTRIL) 10 mg tablet; Take 2 tablets (20 mg total) by mouth daily  -     lisinopril (ZESTRIL) 20 mg tablet; Take 1 tablet (20 mg total) by mouth daily    Type 2 diabetes mellitus without complication, without long-term current use of insulin ([facility])  -     glimepiride (AMARYL) 4 mg tablet; Take 1 tablet (4 mg total) by mouth daily TAKE 2 TABLETS ONCE DAILY    Other orders  -     Cancel: influenza vaccine, high-dose, PF 0 7 mL (FLUZONE HIGH-DOSE)        No problem-specific Assessment & Plan notes found for this encounter  BMI Counseling: Body mass index is 31 57 kg/m²  The BMI is above normal  Exercise recommendations include exercising 3-5 times per week  No pharmacotherapy was ordered  Subjective:  The patient has unilaterally changed meds secondary to myalgias from statins and concerned regarding effectiveness of Linagliptin     Patient ID: Shay Esquivel is a 77 y o  male  The patient is doing well, but notes he has had issues with the statins and myalgia  The patient states that he stopped the medication and has felt better  He notes that he has stopped the glimepiride BID secondary to hypoglycemia  He has started a walking program and states that he has had weight loss and feels better  His glucose is also better controlled  He notes that he needs the Pantoprazole and notes immediate GERD with cessation  He is now taking the lisinopril for his BP and no longer using the HCTZ combo pill (using old meds that he has held on to)  The following portions of the patient's history were reviewed and updated as appropriate:   He has a past medical history of Arthritis of knee, right, History of gastroesophageal reflux (GERD), Hypercholesterolemia, Morbid (severe) obesity due to excess calories ([address] ), Papular rash, Pes anserine bursitis, Sebaceous cyst, Testicular hypogonadism, and Tick bite ,  does not have any pertinent problems on file  ,   has a past surgical history that includes Wrist fusion (Bilateral); Elbow surgery (Right); Colonoscopy; and Upper gastrointestinal endoscopy  ,  family history includes Diabetes type II in his mother; Heart disease in his mother; No Known Problems in his father; Rheumatic fever in his mother  ,   reports that he quit smoking about 35 years ago  His smoking use included cigarettes  He has a 20 00 pack-year smoking history  He has never used smokeless tobacco  He reports current alcohol use  He reports that he does not use drugs  ,  is allergic to pollen extract and tape [medical tape]     Current Outpatient Medications   Medication Sig Dispense Refill    aspirin 81 MG tablet Take 81 mg by mouth once        canagliflozin (Invokana) 100 mg Take 1 tablet (100 mg total) by mouth daily before breakfast 90 tablet 1    glimepiride (AMARYL) 4 mg tablet Take 1 tablet (4 mg total) by mouth daily TAKE 2 TABLETS ONCE DAILY 90 tablet 1    glucose blood (OneTouch Ultra) test strip USE TO TEST BLOOD SUGAR TWICE DAILY (Patient taking differently: USE TO TEST BLOOD SUGAR DAILY) 200 each 1    pantoprazole (PROTONIX) 20 mg tablet TAKE ONE TABLET BY MOUTH EVERY DAY 90 tablet 1    Triamcinolone Acetonide (NASACORT ALLERGY 24HR) 55 MCG/ACT AERO into each nostril      cetirizine (ZyrTEC) 10 mg tablet Take 10 mg by mouth daily (Patient not taking: Reported on 9/11/2021)      lisinopril (ZESTRIL) 20 mg tablet Take 1 tablet (20 mg total) by mouth daily 90 tablet 1    nystatin (MYCOSTATIN) cream Apply topically 2 (two) times a day (Patient not taking: Reported on 5/10/2021) 30 g 0     No current facility-administered medications for this visit  Review of Systems   Constitutional: Negative for chills, fatigue and fever  HENT: Negative  Respiratory: Negative for cough, chest tightness and shortness of breath  Cardiovascular: Negative for chest pain and palpitations  Gastrointestinal: Negative for abdominal pain, constipation, diarrhea, nausea and vomiting  Genitourinary: Negative  Musculoskeletal: Negative for back pain and myalgias  Skin: Negative  Neurological: Negative  Psychiatric/Behavioral: Negative for dysphoric mood  The patient is not nervous/anxious  Objective:  Vitals:    09/11/21 0817   BP: 132/76   BP Location: Left arm   Patient Position: Sitting   Cuff Size: Large   Pulse: 69   Resp: 16   Temp: (!) 97 °F (36 1 °C)   SpO2: 98%   Weight: 97 kg (213 lb 12 8 oz)   Height: 5' 9" (1 753 m)     Body mass index is 31 57 kg/m²  Physical Exam  Vitals and nursing note reviewed  Constitutional:       Appearance: He is well-developed  HENT:      Head: Normocephalic and atraumatic  Eyes:      Pupils: Pupils are equal, round, and reactive to light  Cardiovascular:      Rate and Rhythm: Normal rate and regular rhythm  Heart sounds: Normal heart sounds  No murmur heard       Pulmonary:      Effort: Pulmonary effort is normal       Breath sounds: Normal breath sounds  No stridor  No rales  Abdominal:      General: Bowel sounds are normal  There is no distension  Palpations: Abdomen is soft  Tenderness: There is no abdominal tenderness  Musculoskeletal:         General: No deformity  Normal range of motion  Cervical back: Normal range of motion and neck supple  Skin:     General: Skin is warm and dry  Neurological:      Mental Status: He is alert and oriented to person, place, and time            PHQ-9 Depression Screening    PHQ-9:   Frequency of the following problems over the past two weeks:

## 2021-09-17 ENCOUNTER — TELEPHONE (OUTPATIENT)
Dept: INTERNAL MEDICINE CLINIC | Facility: CLINIC | Age: 76
End: 2021-09-17

## 2021-09-17 NOTE — TELEPHONE ENCOUNTER
The rx for lisinopril 20mg needs to be resent to the pharmacy  They only have the 10mg 2 tabs daily #90, which is incorrect

## 2021-09-21 DIAGNOSIS — E11.8 TYPE 2 DIABETES MELLITUS WITH COMPLICATION, WITHOUT LONG-TERM CURRENT USE OF INSULIN (HCC): ICD-10-CM

## 2021-09-21 DIAGNOSIS — I10 ESSENTIAL HYPERTENSION: ICD-10-CM

## 2021-09-21 PROCEDURE — 4010F ACE/ARB THERAPY RXD/TAKEN: CPT | Performed by: INTERNAL MEDICINE

## 2021-09-21 RX ORDER — LISINOPRIL 20 MG/1
20 TABLET ORAL DAILY
Qty: 90 TABLET | Refills: 1 | Status: SHIPPED | OUTPATIENT
Start: 2021-09-21 | End: 2022-01-24

## 2021-10-28 ENCOUNTER — OFFICE VISIT (OUTPATIENT)
Dept: INTERNAL MEDICINE CLINIC | Facility: CLINIC | Age: 76
End: 2021-10-28
Payer: COMMERCIAL

## 2021-10-28 VITALS
SYSTOLIC BLOOD PRESSURE: 140 MMHG | HEART RATE: 63 BPM | BODY MASS INDEX: 32.44 KG/M2 | WEIGHT: 219 LBS | OXYGEN SATURATION: 98 % | HEIGHT: 69 IN | TEMPERATURE: 96.2 F | DIASTOLIC BLOOD PRESSURE: 82 MMHG

## 2021-10-28 DIAGNOSIS — H60.503 ACUTE OTITIS EXTERNA OF BOTH EARS, UNSPECIFIED TYPE: ICD-10-CM

## 2021-10-28 DIAGNOSIS — H61.22 IMPACTED CERUMEN OF LEFT EAR: Primary | ICD-10-CM

## 2021-10-28 DIAGNOSIS — H69.82 DYSFUNCTION OF LEFT EUSTACHIAN TUBE: ICD-10-CM

## 2021-10-28 DIAGNOSIS — H61.21 CERUMINOSIS, RIGHT: ICD-10-CM

## 2021-10-28 PROCEDURE — 3008F BODY MASS INDEX DOCD: CPT | Performed by: INTERNAL MEDICINE

## 2021-10-28 PROCEDURE — 99213 OFFICE O/P EST LOW 20 MIN: CPT | Performed by: INTERNAL MEDICINE

## 2021-10-28 PROCEDURE — 69210 REMOVE IMPACTED EAR WAX UNI: CPT | Performed by: INTERNAL MEDICINE

## 2021-10-28 RX ORDER — FLUTICASONE PROPIONATE 50 MCG
1 SPRAY, SUSPENSION (ML) NASAL DAILY
Qty: 16 G | Refills: 0 | Status: SHIPPED | OUTPATIENT
Start: 2021-10-28 | End: 2022-04-05

## 2021-10-28 RX ORDER — NEOMYCIN SULFATE, POLYMYXIN B SULFATE AND HYDROCORTISONE 10; 3.5; 1 MG/ML; MG/ML; [USP'U]/ML
4 SUSPENSION/ DROPS AURICULAR (OTIC) 4 TIMES DAILY
Qty: 10 ML | Refills: 0 | Status: SHIPPED | OUTPATIENT
Start: 2021-10-28

## 2021-10-28 RX ORDER — GUAIFENESIN 600 MG
600 TABLET, EXTENDED RELEASE 12 HR ORAL EVERY 12 HOURS SCHEDULED
Qty: 20 TABLET | Refills: 0 | Status: SHIPPED | OUTPATIENT
Start: 2021-10-28 | End: 2022-04-05

## 2021-11-01 DIAGNOSIS — A04.8 H. PYLORI INFECTION: ICD-10-CM

## 2021-11-01 RX ORDER — PANTOPRAZOLE SODIUM 20 MG/1
TABLET, DELAYED RELEASE ORAL
Qty: 90 TABLET | Refills: 1 | Status: SHIPPED | OUTPATIENT
Start: 2021-11-01 | End: 2022-04-18

## 2021-11-11 DIAGNOSIS — E11.9 TYPE 2 DIABETES MELLITUS WITHOUT COMPLICATION, WITHOUT LONG-TERM CURRENT USE OF INSULIN (HCC): ICD-10-CM

## 2021-11-12 RX ORDER — GLIMEPIRIDE 4 MG/1
TABLET ORAL
Qty: 180 TABLET | Refills: 1 | Status: SHIPPED | OUTPATIENT
Start: 2021-11-12 | End: 2022-05-03

## 2021-11-29 ENCOUNTER — IMMUNIZATIONS (OUTPATIENT)
Dept: FAMILY MEDICINE CLINIC | Facility: HOSPITAL | Age: 76
End: 2021-11-29

## 2021-11-29 DIAGNOSIS — Z23 ENCOUNTER FOR IMMUNIZATION: Primary | ICD-10-CM

## 2021-11-29 PROCEDURE — 91306 COVID-19 MODERNA VACC 0.25 ML BOOSTER: CPT

## 2021-11-29 PROCEDURE — 0064A COVID-19 MODERNA VACC 0.25 ML BOOSTER: CPT

## 2022-01-03 DIAGNOSIS — E11.9 TYPE 2 DIABETES MELLITUS WITHOUT COMPLICATION, WITHOUT LONG-TERM CURRENT USE OF INSULIN (HCC): ICD-10-CM

## 2022-01-04 RX ORDER — CANAGLIFLOZIN 100 MG/1
TABLET, FILM COATED ORAL
Qty: 90 TABLET | Refills: 1 | Status: SHIPPED | OUTPATIENT
Start: 2022-01-04 | End: 2022-05-12

## 2022-01-22 DIAGNOSIS — I10 ESSENTIAL HYPERTENSION: ICD-10-CM

## 2022-01-22 DIAGNOSIS — E11.8 TYPE 2 DIABETES MELLITUS WITH COMPLICATION, WITHOUT LONG-TERM CURRENT USE OF INSULIN (HCC): ICD-10-CM

## 2022-01-24 RX ORDER — LISINOPRIL 20 MG/1
TABLET ORAL
Qty: 90 TABLET | Refills: 1 | Status: SHIPPED | OUTPATIENT
Start: 2022-01-24 | End: 2022-07-11

## 2022-03-29 ENCOUNTER — RA CDI HCC (OUTPATIENT)
Dept: OTHER | Facility: HOSPITAL | Age: 77
End: 2022-03-29

## 2022-03-29 NOTE — PROGRESS NOTES
[place] [facility]  coding opportunities       Chart reviewed, no opportunity found:   [address]        Patients Insurance     Medicare Insurance: [employer]

## 2022-04-05 ENCOUNTER — OFFICE VISIT (OUTPATIENT)
Dept: INTERNAL MEDICINE CLINIC | Facility: CLINIC | Age: 77
End: 2022-04-05
Payer: COMMERCIAL

## 2022-04-05 VITALS
HEIGHT: 69 IN | OXYGEN SATURATION: 97 % | TEMPERATURE: 97.6 F | BODY MASS INDEX: 31.81 KG/M2 | SYSTOLIC BLOOD PRESSURE: 130 MMHG | DIASTOLIC BLOOD PRESSURE: 76 MMHG | WEIGHT: 214.8 LBS | HEART RATE: 69 BPM | RESPIRATION RATE: 14 BRPM

## 2022-04-05 DIAGNOSIS — D75.1 POLYCYTHEMIA: ICD-10-CM

## 2022-04-05 DIAGNOSIS — N32.81 OAB (OVERACTIVE BLADDER): ICD-10-CM

## 2022-04-05 DIAGNOSIS — E78.00 HYPERCHOLESTEROLEMIA: ICD-10-CM

## 2022-04-05 DIAGNOSIS — I10 ESSENTIAL HYPERTENSION: ICD-10-CM

## 2022-04-05 DIAGNOSIS — E11.8 TYPE 2 DIABETES MELLITUS WITH COMPLICATION, WITHOUT LONG-TERM CURRENT USE OF INSULIN (HCC): Primary | ICD-10-CM

## 2022-04-05 PROCEDURE — 1160F RVW MEDS BY RX/DR IN RCRD: CPT | Performed by: INTERNAL MEDICINE

## 2022-04-05 PROCEDURE — 1036F TOBACCO NON-USER: CPT | Performed by: INTERNAL MEDICINE

## 2022-04-05 PROCEDURE — 1101F PT FALLS ASSESS-DOCD LE1/YR: CPT | Performed by: INTERNAL MEDICINE

## 2022-04-05 PROCEDURE — 3078F DIAST BP <80 MM HG: CPT | Performed by: INTERNAL MEDICINE

## 2022-04-05 PROCEDURE — 99214 OFFICE O/P EST MOD 30 MIN: CPT | Performed by: INTERNAL MEDICINE

## 2022-04-05 PROCEDURE — 3075F SYST BP GE 130 - 139MM HG: CPT | Performed by: INTERNAL MEDICINE

## 2022-04-05 PROCEDURE — 3288F FALL RISK ASSESSMENT DOCD: CPT | Performed by: INTERNAL MEDICINE

## 2022-04-05 PROCEDURE — 3725F SCREEN DEPRESSION PERFORMED: CPT | Performed by: INTERNAL MEDICINE

## 2022-04-05 RX ORDER — METFORMIN HYDROCHLORIDE 500 MG/1
500 TABLET, EXTENDED RELEASE ORAL
Qty: 90 TABLET | Refills: 1 | Status: SHIPPED | OUTPATIENT
Start: 2022-04-05 | End: 2022-07-08 | Stop reason: SDUPTHER

## 2022-04-05 RX ORDER — AMPICILLIN TRIHYDRATE 250 MG
CAPSULE ORAL
COMMUNITY
Start: 2022-02-15

## 2022-04-05 RX ORDER — UBIDECARENONE 10 MG
10 CAPSULE ORAL DAILY
COMMUNITY
Start: 2022-02-15

## 2022-04-05 RX ORDER — MIRABEGRON 25 MG/1
25 TABLET, FILM COATED, EXTENDED RELEASE ORAL DAILY
Qty: 90 TABLET | Refills: 1 | Status: SHIPPED | OUTPATIENT
Start: 2022-04-05 | End: 2022-07-08

## 2022-04-05 NOTE — ASSESSMENT & PLAN NOTE
Lab Results   Component Value Date    HGBA1C 7 1 (A) 09/11/2021   Reviewed the A1c from the [place] and noted it was now up to 8 2%    Metformin  mg QDay

## 2022-04-05 NOTE — PROGRESS NOTES
Assessment/Plan:    Problem List Items Addressed This Visit        Endocrine    Type 2 diabetes mellitus with complication, without long-term current use of insulin (HCC) - Primary    Relevant Medications    linaGLIPtin 5 MG TABS    metFORMIN (GLUCOPHAGE-XR) 500 mg 24 hr tablet      Other Visit Diagnoses     OAB (overactive bladder)        Relevant Medications    Mirabegron ER (Myrbetriq) 25 MG TB24           Diagnoses and all orders for this visit:    Type 2 diabetes mellitus with complication, without long-term current use of insulin (HCC)  -     metFORMIN (GLUCOPHAGE-XR) 500 mg 24 hr tablet; Take 1 tablet (500 mg total) by mouth daily with dinner    OAB (overactive bladder)  -     Mirabegron ER (Myrbetriq) 25 MG TB24; Take 25 mg by mouth in the morning    Other orders  -     Red Yeast Rice 600 MG CAPS; TAKE  BY MOUTH EVERY DAY  -     Coenzyme Q10 10 MG capsule; TAKE  BY MOUTH EVERY DAY  -     linaGLIPtin 5 MG TABS; Take 5 mg by mouth daily        No problem-specific Assessment & Plan notes found for this encounter  Subjective:      Patient ID: Maxi Escalona is a 69 y o  male  Elevated A1c per labs from [place]  Discussed COVID Booster  The following portions of the patient's history were reviewed and updated as appropriate:   He has a past medical history of Arthritis of knee, right, History of gastroesophageal reflux (GERD), Hypercholesterolemia, Morbid (severe) obesity due to excess calories ([address] ), Papular rash, Pes anserine bursitis, Sebaceous cyst, Testicular hypogonadism, and Tick bite ,  does not have any pertinent problems on file  ,   has a past surgical history that includes Wrist fusion (Bilateral); Elbow surgery (Right); Colonoscopy; and Upper gastrointestinal endoscopy  ,  family history includes Diabetes type II in his mother; Heart disease in his mother; No Known Problems in his father; Rheumatic fever in his mother  ,   reports that he quit smoking about 36 years ago   His smoking use included cigarettes  He has a 20 00 pack-year smoking history  He has never used smokeless tobacco  He reports current alcohol use  He reports that he does not use drugs  ,  is allergic to pollen extract and tape [medical tape]     Current Outpatient Medications   Medication Sig Dispense Refill    aspirin 81 MG tablet Take 81 mg by mouth once        Coenzyme Q10 10 MG capsule TAKE  BY MOUTH EVERY DAY      glimepiride (AMARYL) 4 mg tablet TAKE TWO TABLETS BY MOUTH EVERY  tablet 1    glucose blood (OneTouch Ultra) test strip USE TO TEST BLOOD SUGAR TWICE DAILY (Patient taking differently: USE TO TEST BLOOD SUGAR DAILY) 200 each 1    Invokana 100 MG TAKE ONE TABLET BY MOUTH EVERY DAY BEFORE BREAKFAST 90 tablet 1    linaGLIPtin 5 MG TABS Take 5 mg by mouth daily      lisinopril (ZESTRIL) 20 mg tablet TAKE ONE TABLET ( 20 MG )BY MOUTH DAILY 90 tablet 1    neomycin-polymyxin-hydrocortisone (CORTISPORIN) 0 35%-10,000 units/mL-1% otic suspension Administer 4 drops into both ears 4 (four) times a day 10 mL 0    pantoprazole (PROTONIX) 20 mg tablet TAKE ONE TABLET BY MOUTH EVERY DAY 90 tablet 1    Red Yeast Rice 600 MG CAPS TAKE  BY MOUTH EVERY DAY      Triamcinolone Acetonide (NASACORT ALLERGY 24HR) 55 MCG/ACT AERO into each nostril      metFORMIN (GLUCOPHAGE-XR) 500 mg 24 hr tablet Take 1 tablet (500 mg total) by mouth daily with dinner 90 tablet 1    Mirabegron ER (Myrbetriq) 25 MG TB24 Take 25 mg by mouth in the morning 90 tablet 1     No current facility-administered medications for this visit  Review of Systems   Constitutional: Negative for chills, fatigue and fever  HENT: Negative  Respiratory: Negative for cough, chest tightness and shortness of breath  Cardiovascular: Negative for chest pain and palpitations  Gastrointestinal: Negative for abdominal pain, constipation, diarrhea, nausea and vomiting  Genitourinary: Negative  Musculoskeletal: Negative for back pain and myalgias  Skin: Negative  Neurological: Negative  Psychiatric/Behavioral: Negative for dysphoric mood  The patient is not nervous/anxious  Objective:  Vitals:    04/05/22 0824   BP: 130/76   BP Location: Left arm   Patient Position: Sitting   Cuff Size: Large   Pulse: 69   Resp: 14   Temp: 97 6 °F (36 4 °C)   SpO2: 97%   Weight: 97 4 kg (214 lb 12 8 oz)   Height: 5' 9" (1 753 m)     Body mass index is 31 72 kg/m²  Physical Exam  Vitals and nursing note reviewed  Constitutional:       Appearance: He is well-developed  HENT:      Head: Normocephalic and atraumatic  Eyes:      Pupils: Pupils are equal, round, and reactive to light  Cardiovascular:      Rate and Rhythm: Normal rate and regular rhythm  Heart sounds: Normal heart sounds  No murmur heard  Pulmonary:      Effort: Pulmonary effort is normal       Breath sounds: Normal breath sounds  No stridor  No rales  Abdominal:      General: Bowel sounds are normal  There is no distension  Palpations: Abdomen is soft  Tenderness: There is no abdominal tenderness  Musculoskeletal:         General: No deformity  Normal range of motion  Cervical back: Normal range of motion and neck supple  Skin:     General: Skin is warm and dry  Neurological:      Mental Status: He is alert and oriented to person, place, and time            PHQ-2/9 Depression Screening    Little interest or pleasure in doing things: 0 - not at all  Feeling down, depressed, or hopeless: 0 - not at all  PHQ-2 Score: 0  PHQ-2 Interpretation: Negative depression screen

## 2022-04-05 NOTE — PROGRESS NOTES
Diabetic Foot Exam    Patient's shoes and socks removed  Right Foot/Ankle   Right Foot Inspection  Skin Exam: skin normal, skin intact, callus and callus  No dry skin, no warmth, no erythema, no maceration, no abnormal color, no pre-ulcer and no ulcer  Toe Exam: ROM and strength within normal limits  no right toe deformity    Sensory   Monofilament testing: intact    Vascular  Capillary refills: < 3 seconds  The right DP pulse is 2+  The right PT pulse is 2+  Left Foot/Ankle  Left Foot Inspection  Skin Exam: skin normal, skin intact and callus  No dry skin, no warmth, no erythema, no maceration, normal color, no pre-ulcer and no ulcer  Toe Exam: ROM and strength within normal limits  No left toe deformity  Sensory   Monofilament testing: intact    Vascular  Capillary refills: < 3 seconds  The left DP pulse is 2+  The left PT pulse is 2+       Assign Risk Category  No deformity present  No loss of protective sensation  No weak pulses  Risk: 0

## 2022-04-05 NOTE — ASSESSMENT & PLAN NOTE
Elevated BP and the patient is tolerating the Lisinorpil    Kidney function good per [place] labs

## 2022-04-18 DIAGNOSIS — A04.8 H. PYLORI INFECTION: ICD-10-CM

## 2022-04-18 RX ORDER — PANTOPRAZOLE SODIUM 20 MG/1
TABLET, DELAYED RELEASE ORAL
Qty: 90 TABLET | Refills: 1 | Status: SHIPPED | OUTPATIENT
Start: 2022-04-18

## 2022-05-02 DIAGNOSIS — E11.9 TYPE 2 DIABETES MELLITUS WITHOUT COMPLICATION, WITHOUT LONG-TERM CURRENT USE OF INSULIN (HCC): ICD-10-CM

## 2022-05-03 RX ORDER — GLIMEPIRIDE 4 MG/1
TABLET ORAL
Qty: 180 TABLET | Refills: 1 | Status: SHIPPED | OUTPATIENT
Start: 2022-05-03

## 2022-05-12 DIAGNOSIS — E11.9 TYPE 2 DIABETES MELLITUS WITHOUT COMPLICATION, WITHOUT LONG-TERM CURRENT USE OF INSULIN (HCC): ICD-10-CM

## 2022-05-12 RX ORDER — CANAGLIFLOZIN 100 MG/1
TABLET, FILM COATED ORAL
Qty: 90 TABLET | Refills: 1 | Status: SHIPPED | OUTPATIENT
Start: 2022-05-12 | End: 2022-07-08

## 2022-07-01 ENCOUNTER — RA CDI HCC (OUTPATIENT)
Dept: OTHER | Facility: HOSPITAL | Age: 77
End: 2022-07-01

## 2022-07-01 NOTE — PROGRESS NOTES
[place] [facility]  coding opportunities       Chart reviewed, no opportunity found:   [address]        Patients Insurance     Medicare Insurance: [employer]

## 2022-07-08 ENCOUNTER — OFFICE VISIT (OUTPATIENT)
Dept: INTERNAL MEDICINE CLINIC | Facility: CLINIC | Age: 77
End: 2022-07-08

## 2022-07-08 VITALS
WEIGHT: 216 LBS | BODY MASS INDEX: 31.99 KG/M2 | HEART RATE: 61 BPM | OXYGEN SATURATION: 95 % | TEMPERATURE: 97.6 F | DIASTOLIC BLOOD PRESSURE: 82 MMHG | HEIGHT: 69 IN | SYSTOLIC BLOOD PRESSURE: 140 MMHG

## 2022-07-08 DIAGNOSIS — E11.9 TYPE 2 DIABETES MELLITUS WITHOUT COMPLICATION, WITHOUT LONG-TERM CURRENT USE OF INSULIN (HCC): ICD-10-CM

## 2022-07-08 DIAGNOSIS — E11.8 TYPE 2 DIABETES MELLITUS WITH COMPLICATION, WITHOUT LONG-TERM CURRENT USE OF INSULIN (HCC): ICD-10-CM

## 2022-07-08 DIAGNOSIS — N32.81 OAB (OVERACTIVE BLADDER): ICD-10-CM

## 2022-07-08 DIAGNOSIS — Z12.5 SCREENING PSA (PROSTATE SPECIFIC ANTIGEN): ICD-10-CM

## 2022-07-08 DIAGNOSIS — Z00.00 MEDICARE ANNUAL WELLNESS VISIT, SUBSEQUENT: Primary | ICD-10-CM

## 2022-07-08 LAB — SL AMB POCT HEMOGLOBIN AIC: 9.4 (ref ?–6.5)

## 2022-07-08 RX ORDER — METFORMIN HYDROCHLORIDE 500 MG/1
2000 TABLET, EXTENDED RELEASE ORAL
Qty: 360 TABLET | Refills: 1 | Status: SHIPPED | OUTPATIENT
Start: 2022-07-08 | End: 2022-11-24

## 2022-07-08 RX ORDER — SOLIFENACIN SUCCINATE 10 MG/1
10 TABLET, FILM COATED ORAL DAILY
Qty: 90 TABLET | Refills: 1 | Status: SHIPPED | OUTPATIENT
Start: 2022-07-08 | End: 2022-11-14

## 2022-07-08 NOTE — PROGRESS NOTES
Assessment and Plan:     Problem List Items Addressed This Visit        Endocrine    Type 2 diabetes mellitus with complication, without long-term current use of insulin ([address] )    Relevant Orders    Comprehensive metabolic panel (Completed)    CBC and differential (Completed)   Other Visit Diagnoses     Type 2 diabetes mellitus without complication, without long-term current use of insulin ([address] )    -  Primary    Relevant Orders    POCT hemoglobin A1c (Completed)    OAB (overactive bladder)        Screening PSA (prostate specific antigen)        Relevant Orders    PSA, Total Screen (Completed)           Preventive health issues were discussed with patient, and age appropriate screening tests were ordered as noted in patient's After Visit Summary  Personalized health advice and appropriate referrals for health education or preventive services given if needed, as noted in patient's After Visit Summary  History of Present Illness:     Patient presents for a Medicare Wellness Visit    HPI   Patient Care Team:  Clarke Ruiz DO as PCP - General (Internal Medicine)  Clarke Ruiz DO as PCP - [address] [place] [address] (RTE)  [facility]     Review of Systems:     Review of Systems     Problem List:     Patient Active Problem List   Diagnosis   • Type 2 diabetes mellitus with complication, without long-term current use of insulin ([address] )   • Essential hypertension   • Allergic rhinitis   • Arthritis of knee, right   • Enlarged prostate with lower urinary tract symptoms (LUTS)   • Gastroesophageal reflux disease without esophagitis   • Fungal dermatosis   • Hypercholesterolemia   • Obesity (BMI 30 0-34  9)   • Peyronie's disease   • Encounter for hepatitis C screening test for low risk patient   • Polycythemia   • Colon polyps   • H  pylori infection      Past Medical and Surgical History:     Past Medical History:   Diagnosis Date   • Arthritis of knee, right     LAST ASSESSED: 2/7/17   • History of gastroesophageal reflux (GERD)     LAST ASSESSED: 17   • Hypercholesterolemia     LAST ASSESSED: AND RESOLVED: 18   • Morbid (severe) obesity due to excess calories ([address] )     LAST ASSESSED: 5/10/16   • Papular rash     RESOLVED: 16   • Pes anserine bursitis     LAST ASSESSED: 16   • Sebaceous cyst     LAST ASSESSED: 10/21/14   • Testicular hypogonadism     LAST ASSESSED: 10/30/13   • Tick bite     LAST ASSESSED: 16     Past Surgical History:   Procedure Laterality Date   • COLONOSCOPY     • ELBOW SURGERY Right    • UPPER GASTROINTESTINAL ENDOSCOPY     • WRIST FUSION Bilateral       Family History:     Family History   Problem Relation Age of Onset   • Diabetes type II Mother         MELLITUS   • Rheumatic fever Mother    • Heart disease Mother    • No Known Problems Father       Social History:     Social History     Socioeconomic History   • Marital status: /Civil Union     Spouse name: None   • Number of children: None   • Years of education: None   • Highest education level: None   Occupational History   • Occupation: RETIRED   Tobacco Use   • Smoking status: Former     Packs/day: 1 00     Years: 20 00     Pack years: 20 00     Types: Cigarettes     Quit date: 3/10/1986     Years since quittin 7   • Smokeless tobacco: Never   Vaping Use   • Vaping Use: Never used   Substance and Sexual Activity   • Alcohol use: Not Currently     Comment: Sravanthi    • Drug use: No   • Sexual activity: Not Currently   Other Topics Concern   • None   Social History Narrative    Retired        Lives with spouse      Social Determinants of Health     Financial Resource Strain: Low Risk    • Difficulty of Paying Living Expenses: Not hard at all   Food Insecurity: Not on file   Transportation Needs: No Transportation Needs   • Lack of Transportation (Medical): No   • Lack of Transportation (Non-Medical):  No   Physical Activity: Not on file   Stress: Not on file   Social Connections: Not on file   Intimate Partner Violence: Not on file   Housing Stability: Not on file      Medications and Allergies:     Current Outpatient Medications   Medication Sig Dispense Refill   • aspirin 81 MG tablet Take 81 mg by mouth once       • Coenzyme Q10 10 MG capsule Take 10 mg by mouth daily     • glucose blood (OneTouch Ultra) test strip USE TO TEST BLOOD SUGAR TWICE DAILY (Patient taking differently: USE TO TEST BLOOD SUGAR DAILY) 200 each 1   • Red Yeast Rice 600 MG CAPS TAKE  BY MOUTH EVERY DAY     • Triamcinolone Acetonide (Nasacort Allergy) 55 MCG/ACT nasal spray into each nostril     • glimepiride (AMARYL) 4 mg tablet TAKE TWO TABLETS BY MOUTH EVERY  tablet 1   • linaGLIPtin 5 MG TABS Take 5 mg by mouth daily     • lisinopril (ZESTRIL) 20 mg tablet TAKE ONE TABLET (20 MG) BY MOUTH EVERY DAY 90 tablet 1   • metFORMIN (GLUCOPHAGE-XR) 500 mg 24 hr tablet TAKE FOUR TABLETS BY MOUTH EVERY DAY WITH DINNER 360 tablet 1   • pantoprazole (PROTONIX) 20 mg tablet TAKE ONE TABLET BY MOUTH EVERY DAY 90 tablet 1   • pioglitazone (ACTOS) 30 mg tablet Take 1 tablet (30 mg total) by mouth daily 90 tablet 1   • solifenacin (VESICARE) 10 MG tablet TAKE ONE TABLET BY MOUTH EVERY DAY 90 tablet 1   • tamsulosin (FLOMAX) 0 4 mg Take 1 capsule (0 4 mg total) by mouth daily with dinner 90 capsule 3     No current facility-administered medications for this visit       Allergies   Allergen Reactions   • Pollen Extract Allergic Rhinitis   • Tape [Medical Tape] Rash     BANDAID ADHESIVE      Immunizations:     Immunization History   Administered Date(s) Administered   • COVID-19 MODERNA VACC 0 25 ML IM BOOSTER 11/29/2021   • COVID-19 MODERNA VACC 0 5 ML IM 02/08/2021, 03/08/2021, 11/29/2021   • INFLUENZA 1945, 01/08/2003, 03/01/2003, 09/23/2003, 03/02/2006, 10/23/2007, 10/27/2008, 11/25/2009, 10/01/2010, 09/26/2011, 10/31/2012, 10/22/2013, 10/03/2014, 10/01/2017, 10/01/2018, 10/02/2019   • Influenza Quadrivalent Preservative Free 3 years and older IM 09/25/2018, 10/05/2019, 10/06/2020   • Influenza Split High Dose Preservative Free IM 10/03/2015   • Influenza, Seasonal Vaccine, Quadrivalent, Adjuvanted,  5e 09/25/2021, 09/28/2022   • Influenza, high dose seasonal 0 7 mL 10/02/2019   • Influenza, injectable, quadrivalent, preservative free 0 5 mL 09/25/2018, 10/05/2019, 10/06/2020   • Influenza, seasonal, injectable 10/01/2017   • Influenza, seasonal, injectable, preservative free 10/01/2014, 10/04/2016, 09/26/2017   • Pneumococcal 03/01/2003, 01/15/2013   • Pneumococcal Conjugate 13-Valent 01/27/2016   • Pneumococcal Polysaccharide PPV23 01/15/2013   • Tdap 01/21/2015   • Zoster 01/15/2013   • Zoster Vaccine Recombinant 02/15/2022, 06/15/2022      Health Maintenance:         Topic Date Due   • Hepatitis C Screening  Completed   • Colorectal Cancer Screening  Discontinued         Topic Date Due   • Hepatitis B Vaccine (1 of 3 - 3-dose series) Never done   • COVID-19 Vaccine (4 - Booster for Moderna series) 03/29/2022   • Influenza Vaccine (1) 09/01/2022      Medicare Screening Tests and Risk Assessments:     Williams Dash is here for his Subsequent Wellness visit  Last Medicare Wellness visit information reviewed, patient interviewed and updates made to the record today  Health Risk Assessment:   Patient rates overall health as very good  Patient feels that their physical health rating is same  Patient is satisfied with their life  Eyesight was rated as same  Hearing was rated as same  Patient feels that their emotional and mental health rating is same  Patients states they are never, rarely angry  Patient states they are sometimes unusually tired/fatigued  Pain experienced in the last 7 days has been none  Patient states that he has experienced no weight loss or gain in last 6 months  Depression Screening:   PHQ-2 Score: 0      Fall Risk Screening:    In the past year, patient has experienced: no history of falling in past year      Home Safety:  Patient does not have trouble with stairs inside or outside of their home  Patient has working smoke alarms and has working carbon monoxide detector  Home safety hazards include: none  Nutrition:   Current diet is Regular  Medications:   Patient is currently taking over-the-counter supplements  OTC medications include: see medication list  Patient is able to manage medications  Activities of Daily Living (ADLs)/Instrumental Activities of Daily Living (IADLs):   Walk and transfer into and out of bed and chair?: Yes  Dress and groom yourself?: Yes    Bathe or shower yourself?: Yes    Feed yourself? Yes  Do your laundry/housekeeping?: Yes  Manage your money, pay your bills and track your expenses?: Yes  Make your own meals?: Yes    Do your own shopping?: Yes    Previous Hospitalizations:   Any hospitalizations or ED visits within the last 12 months?: No      Advance Care Planning:   Living will: Yes    Durable POA for healthcare:  Yes    Advanced directive: Yes    Advanced directive counseling given: Yes    Five wishes given: No    Patient declined ACP directive: No    End of Life Decisions reviewed with patient: Yes    Provider agrees with end of life decisions: Yes      Cognitive Screening:   Provider or family/friend/caregiver concerned regarding cognition?: No    PREVENTIVE SCREENINGS      Cardiovascular Screening:    General: Screening Not Indicated and History Lipid Disorder      Diabetes Screening:     General: Screening Not Indicated and History Diabetes      Colorectal Cancer Screening:     General: Screening Current      Prostate Cancer Screening:    General: Screening Not Indicated      Osteoporosis Screening:    General: Screening Not Indicated      Abdominal Aortic Aneurysm (AAA) Screening:    Risk factors include: tobacco use        General: Screening Not Indicated      Lung Cancer Screening:     General: Screening Not Indicated      Hepatitis C Screening:    General: Screening Current    Screening, Brief Intervention, and Referral to Treatment (SBIRT)    Screening  Typical number of drinks in a day: 0  Typical number of drinks in a week: 0  Interpretation: Low risk drinking behavior  Single Item Drug Screening:  How often have you used an illegal drug (including marijuana) or a prescription medication for non-medical reasons in the past year? never    Single Item Drug Screen Score: 0  Interpretation: Negative screen for possible drug use disorder    No results found       Physical Exam:     /82   Pulse 61   Temp 97 6 °F (36 4 °C) (Tympanic)   Ht 5' 9" (1 753 m)   Wt 98 kg (216 lb)   SpO2 95%   BMI 31 90 kg/m²     Physical Exam     Oanh Fly, DO

## 2022-07-08 NOTE — PATIENT INSTRUCTIONS

## 2022-07-11 DIAGNOSIS — I10 ESSENTIAL HYPERTENSION: ICD-10-CM

## 2022-07-11 DIAGNOSIS — E11.8 TYPE 2 DIABETES MELLITUS WITH COMPLICATION, WITHOUT LONG-TERM CURRENT USE OF INSULIN (HCC): ICD-10-CM

## 2022-07-11 RX ORDER — LISINOPRIL 20 MG/1
TABLET ORAL
Qty: 90 TABLET | Refills: 1 | Status: SHIPPED | OUTPATIENT
Start: 2022-07-11

## 2022-08-25 ENCOUNTER — HOSPITAL ENCOUNTER (EMERGENCY)
Facility: HOSPITAL | Age: 77
Discharge: HOME | End: 2022-08-25
Attending: EMERGENCY MEDICINE
Payer: COMMERCIAL

## 2022-08-25 VITALS
RESPIRATION RATE: 18 BRPM | HEART RATE: 101 BPM | HEIGHT: 69 IN | DIASTOLIC BLOOD PRESSURE: 83 MMHG | OXYGEN SATURATION: 96 % | WEIGHT: 212 LBS | SYSTOLIC BLOOD PRESSURE: 135 MMHG | TEMPERATURE: 98.1 F | BODY MASS INDEX: 31.4 KG/M2

## 2022-08-25 DIAGNOSIS — T63.461A STING FROM HORNET, WASP, OR BEE, ACCIDENTAL OR UNINTENTIONAL, INITIAL ENCOUNTER: Primary | ICD-10-CM

## 2022-08-25 DIAGNOSIS — T63.451A STING FROM HORNET, WASP, OR BEE, ACCIDENTAL OR UNINTENTIONAL, INITIAL ENCOUNTER: Primary | ICD-10-CM

## 2022-08-25 DIAGNOSIS — T63.441A STING FROM HORNET, WASP, OR BEE, ACCIDENTAL OR UNINTENTIONAL, INITIAL ENCOUNTER: Primary | ICD-10-CM

## 2022-08-25 PROCEDURE — 99284 EMERGENCY DEPT VISIT MOD MDM: CPT | Performed by: EMERGENCY MEDICINE

## 2022-08-25 PROCEDURE — 99283 EMERGENCY DEPT VISIT LOW MDM: CPT

## 2022-08-25 RX ORDER — FAMOTIDINE 20 MG/1
40 TABLET, FILM COATED ORAL ONCE
Status: COMPLETED | OUTPATIENT
Start: 2022-08-25 | End: 2022-08-25

## 2022-08-25 RX ADMIN — FAMOTIDINE 40 MG: 20 TABLET, FILM COATED ORAL at 18:05

## 2022-08-25 RX ADMIN — DEXAMETHASONE SODIUM PHOSPHATE 10 MG: 10 INJECTION, SOLUTION INTRAMUSCULAR; INTRAVENOUS at 18:05

## 2022-08-25 NOTE — ED PROVIDER NOTES
History  Chief Complaint   Patient presents with    Medical Problem     Patient reports getting stung by 12-15 bees while cutting grass  Patient took benadyl  Denies allergy, sent here by nurse on phone line  69-year-old male presents to the ED stating approximately pole or hours prior to arrival he was riding his 0 term hour when he bumped to post which had a corn to test the basement and they attacked him  He states that he was stung by probably 12-15 hornets  States he drug 0 turn mower away  When side and took some Benadryl and his wife put some topical ointment on them  He denies ever having allergic reaction or any discomfort or difficulty breathing other than itchiness at the sites of the stings  He states he called the nurse line and they told to come to the hospital because he had too much venom in him  Denies any issues at this time other than itchiness at the sites of the stings  Prior to Admission Medications   Prescriptions Last Dose Informant Patient Reported? Taking?    Coenzyme Q10 10 MG capsule 8/25/2022 at Unknown time  Yes Yes   Sig: Take 10 mg by mouth daily   Red Yeast Rice 600 MG CAPS 8/25/2022 at Unknown time  Yes Yes   Sig: TAKE  BY MOUTH EVERY DAY   Triamcinolone Acetonide (Nasacort Allergy) 55 MCG/ACT nasal spray Past Month at Unknown time Self Yes Yes   Sig: into each nostril   aspirin 81 MG tablet 8/25/2022 at Unknown time Self Yes Yes   Sig: Take 81 mg by mouth once     glimepiride (AMARYL) 4 mg tablet 8/25/2022 at Unknown time  No Yes   Sig: TAKE TWO TABLETS BY MOUTH EVERY DAY   glucose blood (OneTouch Ultra) test strip   No No   Sig: USE TO TEST BLOOD SUGAR TWICE DAILY   Patient taking differently: USE TO TEST BLOOD SUGAR DAILY   linaGLIPtin 5 MG TABS Unknown at Unknown time  Yes No   Sig: Take 5 mg by mouth daily   lisinopril (ZESTRIL) 20 mg tablet 8/25/2022 at Unknown time  No Yes   Sig: TAKE ONE TABLET (20 MG) BY MOUTH EVERY DAY   metFORMIN (GLUCOPHAGE-XR) 500 mg 24 hr tablet 8/25/2022 at Unknown time  No Yes   Sig: Take 4 tablets (2,000 mg total) by mouth daily with dinner   neomycin-polymyxin-hydrocortisone (CORTISPORIN) 0 35%-10,000 units/mL-1% otic suspension   No No   Sig: Administer 4 drops into both ears 4 (four) times a day   Patient not taking: Reported on 7/8/2022   pantoprazole (PROTONIX) 20 mg tablet 8/25/2022 at Unknown time  No Yes   Sig: TAKE ONE TABLET BY MOUTH EVERY DAY   solifenacin (VESICARE) 10 MG tablet Not Taking at Unknown time  No No   Sig: Take 1 tablet (10 mg total) by mouth daily   Patient not taking: Reported on 8/25/2022      Facility-Administered Medications: None       Past Medical History:   Diagnosis Date    Arthritis of knee, right     LAST ASSESSED: 2/7/17    History of gastroesophageal reflux (GERD)     LAST ASSESSED: 5/9/17    Hypercholesterolemia     LAST ASSESSED: AND RESOLVED: 1/9/18    Morbid (severe) obesity due to excess calories ([facility] [address] )     LAST ASSESSED: 5/10/16    Papular rash     RESOLVED: 2/1/16    Pes anserine bursitis     LAST ASSESSED: 11/29/16    Sebaceous cyst     LAST ASSESSED: 10/21/14    Testicular hypogonadism     LAST ASSESSED: 10/30/13    Tick bite     LAST ASSESSED: 11/14/16       Past Surgical History:   Procedure Laterality Date    COLONOSCOPY      ELBOW SURGERY Right     UPPER GASTROINTESTINAL ENDOSCOPY      WRIST FUSION Bilateral        Family History   Problem Relation Age of Onset    Diabetes type II Mother         MELLITUS    Rheumatic fever Mother     Heart disease Mother     No Known Problems Father      I have reviewed and agree with the history as documented      E-Cigarette/Vaping    E-Cigarette Use Never User      E-Cigarette/Vaping Substances    Nicotine No     THC No     CBD No     Flavoring No     Other No     Unknown No      Social History     Tobacco Use    Smoking status: Former Smoker     Packs/day: 1 00     Years: 20 00     Pack years: 20 00     Types: Cigarettes     Quit date: 3/10/1986     Years since quittin 4    Smokeless tobacco: Never Used   Vaping Use    Vaping Use: Never used   Substance Use Topics    Alcohol use: Not Currently     Comment: Sravanthi     Drug use: No       Review of Systems   Skin: Positive for wound (hornet stings)  All other systems reviewed and are negative  Physical Exam  Physical Exam  Vitals and nursing note reviewed  Constitutional:       General: He is not in acute distress  Appearance: He is well-developed  He is not diaphoretic  HENT:      Head: Normocephalic and atraumatic  Right Ear: External ear normal       Left Ear: External ear normal       Nose: Nose normal       Mouth/Throat:      Mouth: Mucous membranes are moist       Pharynx: Oropharynx is clear  No oropharyngeal exudate or posterior oropharyngeal erythema  Comments: No oropharyngeal swelling  Eyes:      General: No scleral icterus  Right eye: No discharge  Left eye: No discharge  Conjunctiva/sclera: Conjunctivae normal    Cardiovascular:      Rate and Rhythm: Normal rate and regular rhythm  Pulses: Normal pulses  Heart sounds: Normal heart sounds  No murmur heard  No friction rub  No gallop  Pulmonary:      Effort: Pulmonary effort is normal  No respiratory distress  Breath sounds: Normal breath sounds  No stridor  No wheezing, rhonchi or rales  Chest:      Chest wall: No tenderness  Abdominal:      General: Bowel sounds are normal  There is no distension  Palpations: Abdomen is soft  There is no mass  Tenderness: There is no abdominal tenderness  There is no guarding  Musculoskeletal:         General: No tenderness or deformity  Normal range of motion  Cervical back: Normal range of motion and neck supple  Skin:     General: Skin is warm and dry  Coloration: Skin is not pale  Findings: Erythema (at sites of stings) present  No rash        Comments: Stings noted to abdomen/chest/back/ R and L arms and 1 to the R cheek   Neurological:      Mental Status: He is alert and oriented to person, place, and time  Psychiatric:         Behavior: Behavior normal          Thought Content: Thought content normal          Judgment: Judgment normal          Vital Signs  ED Triage Vitals [08/25/22 1731]   Temperature Pulse Respirations Blood Pressure SpO2   98 5 °F (36 9 °C) 105 19 (!) 165/119 95 %      Temp Source Heart Rate Source Patient Position - Orthostatic VS BP Location FiO2 (%)   Tympanic Monitor Sitting Right arm --      Pain Score       No Pain           Vitals:    08/25/22 1731 08/25/22 1825   BP: (!) 165/119 135/83   Pulse: 105 101   Patient Position - Orthostatic VS: Sitting Sitting         Visual Acuity      ED Medications  Medications   famotidine (PEPCID) tablet 40 mg (40 mg Oral Given 8/25/22 1805)   dexamethasone oral liquid 10 mg 1 mL (10 mg Oral Given 8/25/22 1805)       Diagnostic Studies  Results Reviewed     None                 No orders to display              Procedures  Procedures         ED Course                               SBIRT 20yo+    Flowsheet Row Most Recent Value   SBIRT (23 yo +)    In order to provide better care to our patients, we are screening all of our patients for alcohol and drug use  Would it be okay to ask you these screening questions? Yes Filed at: 08/25/2022 1052   Initial Alcohol Screen: US AUDIT-C     1  How often do you have a drink containing alcohol? 0 Filed at: 08/25/2022 1808   2  How many drinks containing alcohol do you have on a typical day you are drinking? 0 Filed at: 08/25/2022 1808   3a  Male UNDER 65: How often do you have five or more drinks on one occasion? 0 Filed at: 08/25/2022 1808   3b  FEMALE Any Age, or MALE 65+: How often do you have 4 or more drinks on one occassion? 0 Filed at: 08/25/2022 1808   Audit-C Score 0 Filed at: 08/25/2022 3330   MARSHALL: How many times in the past year have you        Used an illegal drug or used a prescription medication for non-medical reasons? Never Filed at: 08/25/2022 1801                    MDM  Number of Diagnoses or Management Options  Diagnosis management comments: 52-year-old otherwise healthy male with no known allergies to bee stings stung by 12 15 hornets proximally for 1/2 hours prior to arrival   Took Benadryl at home  Will provide Pepcid and 1 time dose of Decadron  Did discuss with patient steroids and the fact that he is a diabetic and that it will likely increase his sugars  Patient states he is aware of this and has had steroids in the past and that although it did raise his sugars he was okay  Examined noted stings without any obvious barbs or retained stinger noted in any of them marks  Patient with no respiratory distress, wheezing, stridor, or pharyngeal swelling  Patient with no other symptoms at this time other than feeling a little sleepy after taking the Benadryl  Disposition  Final diagnoses:   Sting from hornet, wasp, or bee, accidental or unintentional, initial encounter     Time reflects when diagnosis was documented in both MDM as applicable and the Disposition within this note     Time User Action Codes Description Comment    8/25/2022  6:40 PM Jad Arrow Add [V39 823P,  T63 441A,  T63 461A] Sting from hornet, wasp, or bee, accidental or unintentional, initial encounter       ED Disposition     ED Disposition   Discharge    Condition   Stable    Date/Time   Thu Aug 25, 2022  6:40 PM    [address] discharge to home/self care                 Follow-up Information     Follow up With Specialties Details Why Contact Info Additional [facility], DO Internal Medicine   [address] Emergency Department Emergency Medicine Go to  As needed, If symptoms worsen [address] [id number]  [facility] Emergency Department, [address] [place] [address], [address] [facility]          Discharge Medication List as of 8/25/2022  6:40 PM      CONTINUE these medications which have NOT CHANGED    Details   aspirin 81 MG tablet Take 81 mg by mouth once  , Historical Med      Coenzyme Q10 10 MG capsule Take 10 mg by mouth daily, Starting Tue 2/15/2022, Historical Med      glimepiride (AMARYL) 4 mg tablet TAKE TWO TABLETS BY MOUTH EVERY DAY, Normal      glucose blood (OneTouch Ultra) test strip USE TO TEST BLOOD SUGAR TWICE DAILY, Normal      linaGLIPtin 5 MG TABS Take 5 mg by mouth daily, Historical Med      lisinopril (ZESTRIL) 20 mg tablet TAKE ONE TABLET (20 MG) BY MOUTH EVERY DAY, Normal      metFORMIN (GLUCOPHAGE-XR) 500 mg 24 hr tablet Take 4 tablets (2,000 mg total) by mouth daily with dinner, Starting Fri 7/8/2022, Until Wed 1/4/2023, Normal      neomycin-polymyxin-hydrocortisone (CORTISPORIN) 0 35%-10,000 units/mL-1% otic suspension Administer 4 drops into both ears 4 (four) times a day, Starting Thu 10/28/2021, Normal      pantoprazole (PROTONIX) 20 mg tablet TAKE ONE TABLET BY MOUTH EVERY DAY, Normal      Red Yeast Rice 600 MG CAPS TAKE  BY MOUTH EVERY DAY, Historical Med      solifenacin (VESICARE) 10 MG tablet Take 1 tablet (10 mg total) by mouth daily, Starting Fri 7/8/2022, Until Wed 1/4/2023, Normal      Triamcinolone Acetonide (Nasacort Allergy) 55 MCG/ACT nasal spray into each nostril, Starting Wed 7/15/2015, Historical Med             No discharge procedures on file      PDMP Review     None          ED Provider  Electronically Signed by           Sergio Pryor DO  08/25/22 2002

## 2022-09-10 DIAGNOSIS — E11.9 TYPE 2 DIABETES MELLITUS WITHOUT COMPLICATION, WITHOUT LONG-TERM CURRENT USE OF INSULIN (HCC): ICD-10-CM

## 2022-09-10 RX ORDER — GLIMEPIRIDE 4 MG/1
TABLET ORAL
Qty: 180 TABLET | Refills: 1 | Status: SHIPPED | OUTPATIENT
Start: 2022-09-10

## 2022-10-03 ENCOUNTER — APPOINTMENT (OUTPATIENT)
Dept: LAB | Facility: CLINIC | Age: 77
End: 2022-10-03
Payer: COMMERCIAL

## 2022-10-03 DIAGNOSIS — Z12.5 SCREENING PSA (PROSTATE SPECIFIC ANTIGEN): ICD-10-CM

## 2022-10-03 DIAGNOSIS — A04.8 H. PYLORI INFECTION: ICD-10-CM

## 2022-10-03 DIAGNOSIS — E11.8 TYPE 2 DIABETES MELLITUS WITH COMPLICATION, WITHOUT LONG-TERM CURRENT USE OF INSULIN (HCC): ICD-10-CM

## 2022-10-03 LAB
ALBUMIN SERPL BCP-MCNC: 3.6 G/DL (ref 3.5–5)
ALP SERPL-CCNC: 78 U/L (ref 46–116)
ALT SERPL W P-5'-P-CCNC: 67 U/L (ref 12–78)
ANION GAP SERPL CALCULATED.3IONS-SCNC: 4 MMOL/L (ref 4–13)
AST SERPL W P-5'-P-CCNC: 32 U/L (ref 5–45)
BASOPHILS # BLD AUTO: 0.08 THOUSANDS/ΜL (ref 0–0.1)
BASOPHILS NFR BLD AUTO: 1 % (ref 0–1)
BILIRUB SERPL-MCNC: 0.8 MG/DL (ref 0.2–1)
BUN SERPL-MCNC: 17 MG/DL (ref 5–25)
CALCIUM SERPL-MCNC: 10.1 MG/DL (ref 8.3–10.1)
CHLORIDE SERPL-SCNC: 105 MMOL/L (ref 96–108)
CO2 SERPL-SCNC: 25 MMOL/L (ref 21–32)
CREAT SERPL-MCNC: 1.05 MG/DL (ref 0.6–1.3)
EOSINOPHIL # BLD AUTO: 0.58 THOUSAND/ΜL (ref 0–0.61)
EOSINOPHIL NFR BLD AUTO: 8 % (ref 0–6)
ERYTHROCYTE [DISTWIDTH] IN BLOOD BY AUTOMATED COUNT: 13.1 % (ref 11.6–15.1)
GFR SERPL CREATININE-BSD FRML MDRD: 68 ML/MIN/1.73SQ M
GLUCOSE P FAST SERPL-MCNC: 191 MG/DL (ref 65–99)
HCT VFR BLD AUTO: 48.8 % (ref 36.5–49.3)
HGB BLD-MCNC: 15.9 G/DL (ref 12–17)
IMM GRANULOCYTES # BLD AUTO: 0.02 THOUSAND/UL (ref 0–0.2)
IMM GRANULOCYTES NFR BLD AUTO: 0 % (ref 0–2)
LYMPHOCYTES # BLD AUTO: 2.21 THOUSANDS/ΜL (ref 0.6–4.47)
LYMPHOCYTES NFR BLD AUTO: 28 % (ref 14–44)
MCH RBC QN AUTO: 29.8 PG (ref 26.8–34.3)
MCHC RBC AUTO-ENTMCNC: 32.6 G/DL (ref 31.4–37.4)
MCV RBC AUTO: 92 FL (ref 82–98)
MONOCYTES # BLD AUTO: 0.55 THOUSAND/ΜL (ref 0.17–1.22)
MONOCYTES NFR BLD AUTO: 7 % (ref 4–12)
NEUTROPHILS # BLD AUTO: 4.33 THOUSANDS/ΜL (ref 1.85–7.62)
NEUTS SEG NFR BLD AUTO: 56 % (ref 43–75)
NRBC BLD AUTO-RTO: 0 /100 WBCS
PLATELET # BLD AUTO: 338 THOUSANDS/UL (ref 149–390)
PMV BLD AUTO: 11.2 FL (ref 8.9–12.7)
POTASSIUM SERPL-SCNC: 5.2 MMOL/L (ref 3.5–5.3)
PROT SERPL-MCNC: 8.3 G/DL (ref 6.4–8.4)
PSA SERPL-MCNC: 0.5 NG/ML (ref 0–4)
RBC # BLD AUTO: 5.33 MILLION/UL (ref 3.88–5.62)
SODIUM SERPL-SCNC: 134 MMOL/L (ref 135–147)
WBC # BLD AUTO: 7.77 THOUSAND/UL (ref 4.31–10.16)

## 2022-10-03 PROCEDURE — 85025 COMPLETE CBC W/AUTO DIFF WBC: CPT

## 2022-10-03 PROCEDURE — 36415 COLL VENOUS BLD VENIPUNCTURE: CPT

## 2022-10-03 PROCEDURE — G0103 PSA SCREENING: HCPCS

## 2022-10-03 PROCEDURE — 80053 COMPREHEN METABOLIC PANEL: CPT

## 2022-10-03 RX ORDER — PANTOPRAZOLE SODIUM 20 MG/1
TABLET, DELAYED RELEASE ORAL
Qty: 90 TABLET | Refills: 1 | Status: SHIPPED | OUTPATIENT
Start: 2022-10-03

## 2022-10-06 ENCOUNTER — RA CDI HCC (OUTPATIENT)
Dept: OTHER | Facility: HOSPITAL | Age: 77
End: 2022-10-06

## 2022-10-06 NOTE — PROGRESS NOTES
[address]  coding opportunities     [address]11 65     Chart Reviewed number of suggestions sent to Provider: 1     Patients Insurance     Medicare Insurance: [address] [place] [address]

## 2022-10-10 ENCOUNTER — OFFICE VISIT (OUTPATIENT)
Dept: INTERNAL MEDICINE CLINIC | Facility: CLINIC | Age: 77
End: 2022-10-10
Payer: COMMERCIAL

## 2022-10-10 VITALS
BODY MASS INDEX: 31.61 KG/M2 | HEART RATE: 69 BPM | DIASTOLIC BLOOD PRESSURE: 80 MMHG | SYSTOLIC BLOOD PRESSURE: 120 MMHG | HEIGHT: 69 IN | WEIGHT: 213.4 LBS | OXYGEN SATURATION: 96 % | TEMPERATURE: 96.2 F | RESPIRATION RATE: 12 BRPM

## 2022-10-10 DIAGNOSIS — R35.0 BENIGN PROSTATIC HYPERPLASIA WITH URINARY FREQUENCY: ICD-10-CM

## 2022-10-10 DIAGNOSIS — N40.1 BENIGN PROSTATIC HYPERPLASIA WITH URINARY FREQUENCY: ICD-10-CM

## 2022-10-10 DIAGNOSIS — I10 ESSENTIAL HYPERTENSION: ICD-10-CM

## 2022-10-10 DIAGNOSIS — E11.8 TYPE 2 DIABETES MELLITUS WITH COMPLICATION, WITHOUT LONG-TERM CURRENT USE OF INSULIN (HCC): ICD-10-CM

## 2022-10-10 DIAGNOSIS — Z00.00 MEDICARE ANNUAL WELLNESS VISIT, SUBSEQUENT: Primary | ICD-10-CM

## 2022-10-10 LAB — SL AMB POCT HEMOGLOBIN AIC: 8.9 (ref ?–6.5)

## 2022-10-10 PROCEDURE — G0439 PPPS, SUBSEQ VISIT: HCPCS | Performed by: INTERNAL MEDICINE

## 2022-10-10 PROCEDURE — 83036 HEMOGLOBIN GLYCOSYLATED A1C: CPT | Performed by: INTERNAL MEDICINE

## 2022-10-10 PROCEDURE — 99214 OFFICE O/P EST MOD 30 MIN: CPT | Performed by: INTERNAL MEDICINE

## 2022-10-10 RX ORDER — TAMSULOSIN HYDROCHLORIDE 0.4 MG/1
0.4 CAPSULE ORAL
Qty: 90 CAPSULE | Refills: 3 | Status: SHIPPED | OUTPATIENT
Start: 2022-10-10

## 2022-10-10 RX ORDER — PIOGLITAZONEHYDROCHLORIDE 30 MG/1
30 TABLET ORAL DAILY
Qty: 90 TABLET | Refills: 1 | Status: SHIPPED | OUTPATIENT
Start: 2022-10-10 | End: 2023-04-08

## 2022-10-10 NOTE — PATIENT INSTRUCTIONS
Medicare Preventive Visit Patient Instructions  Thank you for completing your Welcome to Medicare Visit or Medicare Annual Wellness Visit today  Your next wellness visit will be due in one year (10/11/2023)  The screening/preventive services that you may require over the next 5-10 years are detailed below  Some tests may not apply to you based off risk factors and/or age  Screening tests ordered at today's visit but not completed yet may show as past due  Also, please note that scanned in results may not display below  Preventive Screenings:  Service Recommendations Previous Testing/Comments   Colorectal Cancer Screening  · Colonoscopy    · Fecal Occult Blood Test (FOBT)/Fecal Immunochemical Test (FIT)  · Fecal DNA/Cologuard Test  · Flexible Sigmoidoscopy Age: 39-70 years old   Colonoscopy: every 10 years (May be performed more frequently if at higher risk)  OR  FOBT/FIT: every 1 year  OR  Cologuard: every 3 years  OR  Sigmoidoscopy: every 5 years  Screening may be recommended earlier than age 39 if at higher risk for colorectal cancer  Also, an individualized decision between you and your healthcare provider will decide whether screening between the ages of 74-80 would be appropriate   Colonoscopy: 08/27/2020  FOBT/FIT: Not on file  Cologuard: Not on file  Sigmoidoscopy: Not on file          Prostate Cancer Screening Individualized decision between patient and health care provider in men between ages of 53-78   Medicare will cover every 12 months beginning on the day after your 50th birthday PSA: 0 5 ng/mL     Screening Not Indicated     Hepatitis C Screening Once for adults born between 1945 and 1965  More frequently in patients at high risk for Hepatitis C Hep C Antibody: 09/11/2018    Screening Current   Diabetes Screening 1-2 times per year if you're at risk for diabetes or have pre-diabetes Fasting glucose: 191 mg/dL (10/3/2022)  A1C: 9 4 (7/8/2022)  Screening Not Indicated  History Diabetes   Cholesterol Screening Once every 5 years if you don't have a lipid disorder  May order more often based on risk factors  Lipid panel: 07/14/2020  Screening Not Indicated  History Lipid Disorder      Other Preventive Screenings Covered by Medicare:  1  Abdominal Aortic Aneurysm (AAA) Screening: covered once if your at risk  You're considered to be at risk if you have a family history of AAA or a male between the age of 73-68 who smoking at least 100 cigarettes in your lifetime  2  Lung Cancer Screening: covers low dose CT scan once per year if you meet all of the following conditions: (1) Age 50-69; (2) No signs or symptoms of lung cancer; (3) Current smoker or have quit smoking within the last 15 years; (4) You have a tobacco smoking history of at least 20 pack years (packs per day x number of years you smoked); (5) You get a written order from a healthcare provider  3  Glaucoma Screening: covered annually if you're considered high risk: (1) You have diabetes OR (2) Family history of glaucoma OR (3)  aged 48 and older OR (3)  American aged 72 and older  3  Osteoporosis Screening: covered every 2 years if you meet one of the following conditions: (1) Have a vertebral abnormality; (2) On glucocorticoid therapy for more than 3 months; (3) Have primary hyperparathyroidism; (4) On osteoporosis medications and need to assess response to drug therapy  5  HIV Screening: covered annually if you're between the age of 12-76  Also covered annually if you are younger than 13 and older than 72 with risk factors for HIV infection  For pregnant patients, it is covered up to 3 times per pregnancy      Immunizations:  Immunization Recommendations   Influenza Vaccine Annual influenza vaccination during flu season is recommended for all persons aged >= 6 months who do not have contraindications   Pneumococcal Vaccine   * Pneumococcal conjugate vaccine = PCV13 (Prevnar 13), PCV15 (Vaxneuvance), PCV20 (Prevnar 20)  * Pneumococcal polysaccharide vaccine = PPSV23 (Pneumovax) Adults 2364 years old: 1-3 doses may be recommended based on certain risk factors  Adults 72 years old: 1-2 doses may be recommended based off what pneumonia vaccine you previously received   Hepatitis B Vaccine 3 dose series if at intermediate or high risk (ex: diabetes, end stage renal disease, liver disease)   Tetanus (Td) Vaccine - COST NOT COVERED BY MEDICARE PART B Following completion of primary series, a booster dose should be given every 10 years to maintain immunity against tetanus  Td may also be given as tetanus wound prophylaxis  Tdap Vaccine - COST NOT COVERED BY MEDICARE PART B Recommended at least once for all adults  For pregnant patients, recommended with each pregnancy  Shingles Vaccine (Shingrix) - COST NOT COVERED BY MEDICARE PART B  2 shot series recommended in those aged 48 and above     Health Maintenance Due:      Topic Date Due   • Hepatitis C Screening  Completed     Immunizations Due:      Topic Date Due   • COVID-19 Vaccine (4 - Booster for Moderna series) 03/29/2022   • Influenza Vaccine (1) 09/01/2022     Advance Directives   What are advance directives? Advance directives are legal documents that state your wishes and plans for medical care  These plans are made ahead of time in case you lose your ability to make decisions for yourself  Advance directives can apply to any medical decision, such as the treatments you want, and if you want to donate organs  What are the types of advance directives? There are many types of advance directives, and each state has rules about how to use them  You may choose a combination of any of the following:  · Living will: This is a written record of the treatment you want  You can also choose which treatments you do not want, which to limit, and which to stop at a certain time  This includes surgery, medicine, IV fluid, and tube feedings     · Durable power of  for healthcare [facility]): This is a written record that states who you want to make healthcare choices for you when you are unable to make them for yourself  This person, called a proxy, is usually a family member or a friend  You may choose more than 1 proxy  · Do not resuscitate (DNR) order:  A DNR order is used in case your heart stops beating or you stop breathing  It is a request not to have certain forms of treatment, such as CPR  A DNR order may be included in other types of advance directives  · Medical directive: This covers the care that you want if you are in a coma, near death, or unable to make decisions for yourself  You can list the treatments you want for each condition  Treatment may include pain medicine, surgery, blood transfusions, dialysis, IV or tube feedings, and a ventilator (breathing machine)  · Values history: This document has questions about your views, beliefs, and how you feel and think about life  This information can help others choose the care that you would choose  Why are advance directives important? An advance directive helps you control your care  Although spoken wishes may be used, it is better to have your wishes written down  Spoken wishes can be misunderstood, or not followed  Treatments may be given even if you do not want them  An advance directive may make it easier for your family to make difficult choices about your care  Weight Management   Why it is important to manage your weight:  Being overweight increases your risk of health conditions such as heart disease, high blood pressure, type 2 diabetes, and certain types of cancer  It can also increase your risk for osteoarthritis, sleep apnea, and other respiratory problems  Aim for a slow, steady weight loss  Even a small amount of weight loss can lower your risk of health problems  How to lose weight safely:  A safe and healthy way to lose weight is to eat fewer calories and get regular exercise   You can lose up about 1 pound a week by decreasing the number of calories you eat by 500 calories each day  Healthy meal plan for weight management:  A healthy meal plan includes a variety of foods, contains fewer calories, and helps you stay healthy  A healthy meal plan includes the following:  · Eat whole-grain foods more often  A healthy meal plan should contain fiber  Fiber is the part of grains, fruits, and vegetables that is not broken down by your body  Whole-grain foods are healthy and provide extra fiber in your diet  Some examples of whole-grain foods are whole-wheat breads and pastas, oatmeal, brown rice, and bulgur  · Eat a variety of vegetables every day  Include dark, leafy greens such as spinach, kale, otilio greens, and mustard greens  Eat yellow and orange vegetables such as carrots, sweet potatoes, and winter squash  · Eat a variety of fruits every day  Choose fresh or canned fruit (canned in its own juice or light syrup) instead of juice  Fruit juice has very little or no fiber  · Eat low-fat dairy foods  Drink fat-free (skim) milk or 1% milk  Eat fat-free yogurt and low-fat cottage cheese  Try low-fat cheeses such as mozzarella and other reduced-fat cheeses  · Choose meat and other protein foods that are low in fat  Choose beans or other legumes such as split peas or lentils  Choose fish, skinless poultry (chicken or turkey), or lean cuts of red meat (beef or pork)  Before you cook meat or poultry, cut off any visible fat  · Use less fat and oil  Try baking foods instead of frying them  Add less fat, such as margarine, sour cream, regular salad dressing and mayonnaise to foods  Eat fewer high-fat foods  Some examples of high-fat foods include french fries, doughnuts, ice cream, and cakes  · Eat fewer sweets  Limit foods and drinks that are high in sugar  This includes candy, cookies, regular soda, and sweetened drinks  Exercise:  Exercise at least 30 minutes per day on most days of the week   Some examples of exercise include walking, biking, dancing, and swimming  You can also fit in more physical activity by taking the stairs instead of the elevator or parking farther away from stores  Ask your healthcare provider about the best exercise plan for you  © Copyright [employer] 2018 Information is for End User's use only and may not be sold, redistributed or otherwise used for commercial purposes   All illustrations and images included in CareNotes® are the copyrighted property of A D A M , Inc  or [address] [place] [address]

## 2022-10-10 NOTE — PROGRESS NOTES
Assessment and Plan:     Problem List Items Addressed This Visit        Endocrine    Type 2 diabetes mellitus with complication, without long-term current use of insulin ([facility] [address] )       Lab Results   Component Value Date    HGBA1C 8 9 (A) 10/10/2022   elevated A1c and we added Actos  Black Box Warning discussed  Continue metformin   Continue Glimepiride  Reviewed labs         Relevant Medications    pioglitazone (ACTOS) 30 mg tablet    Other Relevant Orders    POCT hemoglobin A1c (Completed)       Cardiovascular and Mediastinum    Essential hypertension     BP well controlled  Genitourinary    Enlarged prostate with lower urinary tract symptoms (LUTS)     Add Flomax   Continue Vesicare for now  Trial off with better DM control  Relevant Medications    tamsulosin (FLOMAX) 0 4 mg      Other Visit Diagnoses     Medicare annual wellness visit, subsequent    -  Primary           Preventive health issues were discussed with patient, and age appropriate screening tests were ordered as noted in patient's After Visit Summary  Personalized health advice and appropriate referrals for health education or preventive services given if needed, as noted in patient's After Visit Summary  History of Present Illness:     Patient presents for a Medicare Wellness Visit    The patient states that the Gilmar Moncada is to expensive and brought his formulary to discuss medications options  We agreed that the patient should try Actos  We discussed black box warning and noted that there was a risk of bladder CA  The patient was agreeable  His already on a sulfonylurea and Metformin  The patient states that he has issues of polyuriea  We discussed his elevated glucose as a cause  However, there has been a prior concern regarding BPH  We will try Flomax (also on formulary)       Patient Care Team:  Karel Bruno DO as PCP - General (Internal Medicine)  Karel Bruno DO as PCP - [address] [place] [address],[address] (RTE)  Delmer Bird [place], DO as PCP - [facility] (RTE)     Review of Systems:     Review of Systems   Constitutional: Negative for chills, fatigue and fever  HENT: Negative  Respiratory: Negative for cough, chest tightness and shortness of breath  Cardiovascular: Negative for chest pain and palpitations  Gastrointestinal: Negative for abdominal pain, constipation, diarrhea, nausea and vomiting  Genitourinary: Positive for frequency  Musculoskeletal: Negative for back pain and myalgias  Skin: Negative  Neurological: Negative  Psychiatric/Behavioral: Negative for dysphoric mood  The patient is not nervous/anxious  Problem List:     Patient Active Problem List   Diagnosis   • Type 2 diabetes mellitus with complication, without long-term current use of insulin ([facility] [address] )   • Essential hypertension   • Allergic rhinitis   • Arthritis of knee, right   • Enlarged prostate with lower urinary tract symptoms (LUTS)   • Gastroesophageal reflux disease without esophagitis   • Fungal dermatosis   • Hypercholesterolemia   • Obesity (BMI 30 0-34  9)   • Peyronie's disease   • Encounter for hepatitis C screening test for low risk patient   • Polycythemia   • Colon polyps   • H  pylori infection      Past Medical and Surgical History:     Past Medical History:   Diagnosis Date   • Arthritis of knee, right     LAST ASSESSED: 2/7/17   • History of gastroesophageal reflux (GERD)     LAST ASSESSED: 5/9/17   • Hypercholesterolemia     LAST ASSESSED: AND RESOLVED: 1/9/18   • Morbid (severe) obesity due to excess calories ([facility] [address] )     LAST ASSESSED: 5/10/16   • Papular rash     RESOLVED: 2/1/16   • Pes anserine bursitis     LAST ASSESSED: 11/29/16   • Sebaceous cyst     LAST ASSESSED: 10/21/14   • Testicular hypogonadism     LAST ASSESSED: 10/30/13   • Tick bite     LAST ASSESSED: 11/14/16     Past Surgical History:   Procedure Laterality Date   • COLONOSCOPY     • ELBOW SURGERY Right    • UPPER GASTROINTESTINAL ENDOSCOPY     • WRIST FUSION Bilateral       Family History:     Family History   Problem Relation Age of Onset   • Diabetes type II Mother         MELLITUS   • Rheumatic fever Mother    • Heart disease Mother    • No Known Problems Father       Social History:     Social History     Socioeconomic History   • Marital status: /Civil Union     Spouse name: None   • Number of children: None   • Years of education: None   • Highest education level: None   Occupational History   • Occupation: RETIRED   Tobacco Use   • Smoking status: Former Smoker     Packs/day: 1 00     Years: 20 00     Pack years: 20 00     Types: Cigarettes     Quit date: 3/10/1986     Years since quittin 6   • Smokeless tobacco: Never Used   Vaping Use   • Vaping Use: Never used   Substance and Sexual Activity   • Alcohol use: Not Currently     Comment: Sravanthi    • Drug use: No   • Sexual activity: Not Currently   Other Topics Concern   • None   Social History Narrative    Retired        Lives with spouse      Social Determinants of Health     Financial Resource Strain: Low Risk    • Difficulty of Paying Living Expenses: Not hard at all   Food Insecurity: Not on file   Transportation Needs: No Transportation Needs   • Lack of Transportation (Medical): No   • Lack of Transportation (Non-Medical):  No   Physical Activity: Not on file   Stress: Not on file   Social Connections: Not on file   Intimate Partner Violence: Not on file   Housing Stability: Not on file      Medications and Allergies:     Current Outpatient Medications   Medication Sig Dispense Refill   • aspirin 81 MG tablet Take 81 mg by mouth once       • Coenzyme Q10 10 MG capsule Take 10 mg by mouth daily     • glimepiride (AMARYL) 4 mg tablet TAKE TWO TABLETS BY MOUTH EVERY  tablet 1   • glucose blood (OneTouch Ultra) test strip USE TO TEST BLOOD SUGAR TWICE DAILY (Patient taking differently: USE TO TEST BLOOD SUGAR DAILY) 200 each 1   • linaGLIPtin 5 MG TABS Take 5 mg by mouth daily     • lisinopril (ZESTRIL) 20 mg tablet TAKE ONE TABLET (20 MG) BY MOUTH EVERY DAY 90 tablet 1   • metFORMIN (GLUCOPHAGE-XR) 500 mg 24 hr tablet Take 4 tablets (2,000 mg total) by mouth daily with dinner 360 tablet 1   • pantoprazole (PROTONIX) 20 mg tablet TAKE ONE TABLET BY MOUTH EVERY DAY 90 tablet 1   • pioglitazone (ACTOS) 30 mg tablet Take 1 tablet (30 mg total) by mouth daily 90 tablet 1   • Red Yeast Rice 600 MG CAPS TAKE  BY MOUTH EVERY DAY     • solifenacin (VESICARE) 10 MG tablet Take 1 tablet (10 mg total) by mouth daily 90 tablet 1   • tamsulosin (FLOMAX) 0 4 mg Take 1 capsule (0 4 mg total) by mouth daily with dinner 90 capsule 3   • Triamcinolone Acetonide (Nasacort Allergy) 55 MCG/ACT nasal spray into each nostril       No current facility-administered medications for this visit       Allergies   Allergen Reactions   • Pollen Extract Allergic Rhinitis   • Tape [Medical Tape] Rash     BANDAID ADHESIVE      Immunizations:     Immunization History   Administered Date(s) Administered   • COVID-19 MODERNA VACC 0 25 ML IM BOOSTER 11/29/2021   • COVID-19 MODERNA VACC 0 5 ML IM 02/08/2021, 03/08/2021, 11/29/2021   • INFLUENZA 1945, 01/08/2003, 03/01/2003, 09/23/2003, 03/02/2006, 10/23/2007, 10/27/2008, 11/25/2009, 10/01/2010, 09/26/2011, 10/31/2012, 10/22/2013, 10/03/2014, 10/01/2017, 10/01/2018, 10/02/2019   • Influenza Quadrivalent Preservative Free 3 years and older IM 09/25/2018, 10/05/2019, 10/06/2020   • Influenza Split High Dose Preservative Free IM 10/03/2015   • Influenza, Seasonal Vaccine, Quadrivalent, Adjuvanted,  5e 09/25/2021, 09/28/2022   • Influenza, high dose seasonal 0 7 mL 10/02/2019   • Influenza, injectable, quadrivalent, preservative free 0 5 mL 09/25/2018, 10/05/2019, 10/06/2020   • Influenza, seasonal, injectable 10/01/2017   • Influenza, seasonal, injectable, preservative free 10/01/2014, 10/04/2016, 09/26/2017   • Pneumococcal 03/01/2003, 01/15/2013   • Pneumococcal Conjugate 13-Valent 01/27/2016   • Pneumococcal Polysaccharide PPV23 01/15/2013   • Tdap 01/21/2015   • Zoster 01/15/2013   • Zoster Vaccine Recombinant 02/15/2022, 06/15/2022      Health Maintenance:         Topic Date Due   • Hepatitis C Screening  Completed         Topic Date Due   • COVID-19 Vaccine (4 - Booster for Moderna series) 03/29/2022   • Influenza Vaccine (1) 09/01/2022      Medicare Screening Tests and Risk Assessments:     Linda Moore is here for his Subsequent Wellness visit  Last Medicare Wellness visit information reviewed, patient interviewed and updates made to the record today  Health Risk Assessment:   Patient rates overall health as very good  Patient feels that their physical health rating is same  Patient is very satisfied with their life  Eyesight was rated as same  Hearing was rated as same  Patient feels that their emotional and mental health rating is same  Patients states they are never, rarely angry  Patient states they are sometimes unusually tired/fatigued  Pain experienced in the last 7 days has been none  Patient states that he has experienced no weight loss or gain in last 6 months  Depression Screening:   PHQ-2 Score: 0      Fall Risk Screening: In the past year, patient has experienced: no history of falling in past year      Home Safety:  Patient does not have trouble with stairs inside or outside of their home  Patient has working smoke alarms and has working carbon monoxide detector  Home safety hazards include: none  Nutrition:   Current diet is Regular  Medications:   Patient is currently taking over-the-counter supplements  OTC medications include: see medication list  Patient is able to manage medications  Activities of Daily Living (ADLs)/Instrumental Activities of Daily Living (IADLs):   Walk and transfer into and out of bed and chair?: Yes  Dress and groom yourself?: Yes    Bathe or shower yourself?: Yes    Feed yourself?  Yes  Do your laundry/housekeeping?: Yes  Manage your money, pay your bills and track your expenses?: Yes  Make your own meals?: Yes    Do your own shopping?: Yes    Previous Hospitalizations:   Any hospitalizations or ED visits within the last 12 months?: Yes    How many hospitalizations have you had in the last year?: 1-2    Advance Care Planning:   Living will: Yes    Durable POA for healthcare: Yes    Advanced directive: Yes    Advanced directive counseling given: No    Five wishes given: No    Patient declined ACP directive: No    End of Life Decisions reviewed with patient: Yes    Provider agrees with end of life decisions: Yes      Cognitive Screening:   Provider or family/friend/caregiver concerned regarding cognition?: No    PREVENTIVE SCREENINGS      Cardiovascular Screening:    General: Screening Not Indicated and History Lipid Disorder      Diabetes Screening:     General: Screening Not Indicated and History Diabetes      Colorectal Cancer Screening:     General: Screening Current      Prostate Cancer Screening:    General: Screening Not Indicated      Osteoporosis Screening:    General: Screening Not Indicated      Abdominal Aortic Aneurysm (AAA) Screening:    Risk factors include: tobacco use        General: Screening Not Indicated      Lung Cancer Screening:     General: Screening Not Indicated      Hepatitis C Screening:    General: Screening Current    Screening, Brief Intervention, and Referral to Treatment (SBIRT)    Screening  Typical number of drinks in a day: 0  Typical number of drinks in a week: 0  Interpretation: Low risk drinking behavior      Single Item Drug Screening:  How often have you used an illegal drug (including marijuana) or a prescription medication for non-medical reasons in the past year? never    Single Item Drug Screen Score: 0  Interpretation: Negative screen for possible drug use disorder    No exam data present     Physical Exam:     /80   Pulse 69   Temp (!) 96 2 °F (35 7 °C) Resp 12   Ht 5' 9" (1 753 m)   Wt 96 8 kg (213 lb 6 4 oz)   SpO2 96%   BMI 31 51 kg/m²     Physical Exam  Vitals and nursing note reviewed  Constitutional:       Appearance: He is well-developed  HENT:      Head: Normocephalic and atraumatic  Eyes:      Conjunctiva/sclera: Conjunctivae normal    Cardiovascular:      Rate and Rhythm: Normal rate and regular rhythm  Heart sounds: No murmur heard  Pulmonary:      Effort: Pulmonary effort is normal  No respiratory distress  Breath sounds: Normal breath sounds  Abdominal:      Palpations: Abdomen is soft  Tenderness: There is no abdominal tenderness  Musculoskeletal:      Cervical back: Neck supple  Skin:     General: Skin is warm and dry  Neurological:      Mental Status: He is alert            [facility], DO

## 2022-10-10 NOTE — ASSESSMENT & PLAN NOTE
Lab Results   Component Value Date    HGBA1C 8 9 (A) 10/10/2022   elevated A1c and we added Actos  Black Box Warning discussed  Continue metformin   Continue Glimepiride  Reviewed labs

## 2022-11-07 LAB
CREAT ?TM UR-SCNC: 81 UMOL/L
EXT MICROALBUMIN URINE RANDOM: 2
MICROALBUMIN/CREAT UR: 25 MG/G{CREAT}

## 2022-11-14 DIAGNOSIS — N32.81 OAB (OVERACTIVE BLADDER): ICD-10-CM

## 2022-11-14 RX ORDER — SOLIFENACIN SUCCINATE 10 MG/1
TABLET, FILM COATED ORAL
Qty: 90 TABLET | Refills: 1 | Status: SHIPPED | OUTPATIENT
Start: 2022-11-14

## 2022-11-24 DIAGNOSIS — E11.8 TYPE 2 DIABETES MELLITUS WITH COMPLICATION, WITHOUT LONG-TERM CURRENT USE OF INSULIN (HCC): ICD-10-CM

## 2022-11-24 RX ORDER — METFORMIN HYDROCHLORIDE 500 MG/1
TABLET, EXTENDED RELEASE ORAL
Qty: 360 TABLET | Refills: 1 | Status: SHIPPED | OUTPATIENT
Start: 2022-11-24

## 2022-12-25 DIAGNOSIS — E11.8 TYPE 2 DIABETES MELLITUS WITH COMPLICATION, WITHOUT LONG-TERM CURRENT USE OF INSULIN (HCC): ICD-10-CM

## 2022-12-25 DIAGNOSIS — I10 ESSENTIAL HYPERTENSION: ICD-10-CM

## 2022-12-25 RX ORDER — LISINOPRIL 20 MG/1
TABLET ORAL
Qty: 90 TABLET | Refills: 1 | Status: SHIPPED | OUTPATIENT
Start: 2022-12-25

## 2023-01-06 ENCOUNTER — RA CDI HCC (OUTPATIENT)
Dept: OTHER | Facility: HOSPITAL | Age: 78
End: 2023-01-06

## 2023-01-06 NOTE — PROGRESS NOTES
[address]  coding opportunities     [address]     Chart Reviewed number of suggestions sent to Provider: 1     Patients Insurance     Medicare Insurance: [address] [place] [address]

## 2023-01-11 ENCOUNTER — OFFICE VISIT (OUTPATIENT)
Dept: INTERNAL MEDICINE CLINIC | Facility: CLINIC | Age: 78
End: 2023-01-11

## 2023-01-11 VITALS
DIASTOLIC BLOOD PRESSURE: 74 MMHG | OXYGEN SATURATION: 97 % | RESPIRATION RATE: 14 BRPM | HEIGHT: 69 IN | TEMPERATURE: 97.4 F | SYSTOLIC BLOOD PRESSURE: 132 MMHG | HEART RATE: 71 BPM | WEIGHT: 230.6 LBS | BODY MASS INDEX: 34.16 KG/M2

## 2023-01-11 DIAGNOSIS — E11.8 TYPE 2 DIABETES MELLITUS WITH COMPLICATION, WITHOUT LONG-TERM CURRENT USE OF INSULIN (HCC): Primary | ICD-10-CM

## 2023-01-11 DIAGNOSIS — E11.9 TYPE 2 DIABETES MELLITUS WITHOUT COMPLICATION, WITHOUT LONG-TERM CURRENT USE OF INSULIN (HCC): ICD-10-CM

## 2023-01-11 DIAGNOSIS — K21.9 GASTROESOPHAGEAL REFLUX DISEASE WITHOUT ESOPHAGITIS: ICD-10-CM

## 2023-01-11 DIAGNOSIS — I10 ESSENTIAL HYPERTENSION: ICD-10-CM

## 2023-01-11 DIAGNOSIS — E78.00 HYPERCHOLESTEROLEMIA: ICD-10-CM

## 2023-01-11 LAB — SL AMB POCT HEMOGLOBIN AIC: 6.7 (ref ?–6.5)

## 2023-01-11 RX ORDER — BLOOD SUGAR DIAGNOSTIC
STRIP MISCELLANEOUS
Qty: 200 EACH | Refills: 1 | Status: SHIPPED | OUTPATIENT
Start: 2023-01-11

## 2023-01-11 RX ORDER — CHLORAL HYDRATE 500 MG
CAPSULE ORAL
COMMUNITY

## 2023-01-11 RX ORDER — MULTIVITAMIN
1 TABLET ORAL DAILY
COMMUNITY

## 2023-01-11 NOTE — ASSESSMENT & PLAN NOTE
The patient was seen and examined and noted to have issues GERD at this time    Tolerating the Pantoprozole

## 2023-01-11 NOTE — PROGRESS NOTES
Diabetic Foot Exam    Patient's shoes and socks removed  Right Foot/Ankle   Right Foot Inspection  Skin Exam: skin normal, skin intact, callus and callus  No dry skin, no warmth, no erythema, no maceration, no abnormal color, no pre-ulcer and no ulcer  Toe Exam: ROM and strength within normal limits  no right toe deformity    Sensory   Monofilament testing: intact    Vascular  Capillary refills: elevated  The right DP pulse is 2+  The right PT pulse is 2+  Left Foot/Ankle  Left Foot Inspection  Skin Exam: skin normal, skin intact and callus  No dry skin, no warmth, no erythema, no maceration, normal color, no pre-ulcer and no ulcer  Toe Exam: ROM and strength within normal limits  No left toe deformity  Sensory   Monofilament testing: intact    Vascular  Capillary refills: elevated  The left DP pulse is 2+  The left PT pulse is 2+       Assign Risk Category  No deformity present  No loss of protective sensation  No weak pulses  Risk: 0

## 2023-01-11 NOTE — PROGRESS NOTES
Name: Malinda Quiroz      : 1945      MRN: [id number]  Encounter Provider: Erica Dee DO  Encounter Date: 2023   Encounter department: [address] [place] [address]  Type 2 diabetes mellitus with complication, without long-term current use of insulin ([facility])  Assessment & Plan:    Lab Results   Component Value Date    HGBA1C 6 7 (A) 2023   A1c is good with the Tradjenta, Glimepiride, metformin XR  A1c is good at 6 7%    Orders:  -     POCT hemoglobin A1c  -     Comprehensive metabolic panel; Future  -     Lipid Panel with Direct LDL reflex; Future  -     Microalbumin / creatinine urine ratio  -     Hemoglobin A1C; Future  -     CBC and differential; Future    2  Type 2 diabetes mellitus without complication, without long-term current use of insulin (HCC)  -     glucose blood (OneTouch Ultra) test strip; USE TO TEST BLOOD SUGAR DAILY    3  Essential hypertension  Assessment & Plan:  Lisinopril 20 mg QDay      4  Hypercholesterolemia  Assessment & Plan:  Declines statin      5  Gastroesophageal reflux disease without esophagitis  Assessment & Plan:  The patient was seen and examined and noted to have issues GERD at this time    Tolerating the Pantoprozole             Subjective      HPI  Review of Systems    Current Outpatient Medications on File Prior to Visit   Medication Sig   • Ascorbic Acid (VITAMIN C PO) Take by mouth   • aspirin 81 MG tablet Take 81 mg by mouth once     • Coenzyme Q10 10 MG capsule Take 10 mg by mouth daily   • glimepiride (AMARYL) 4 mg tablet TAKE TWO TABLETS BY MOUTH EVERY DAY   • lisinopril (ZESTRIL) 20 mg tablet TAKE ONE TABLET BY MOUTH EVERY DAY   • metFORMIN (GLUCOPHAGE-XR) 500 mg 24 hr tablet TAKE FOUR TABLETS BY MOUTH EVERY DAY WITH DINNER   • Multiple Vitamin (multivitamin) tablet Take 1 tablet by mouth daily   • Omega-3 1000 MG CAPS Take by mouth   • pantoprazole (PROTONIX) 20 mg tablet TAKE ONE TABLET BY MOUTH EVERY DAY   • pioglitazone (ACTOS) 30 mg tablet Take 1 tablet (30 mg total) by mouth daily   • Red Yeast Rice 600 MG CAPS TAKE  BY MOUTH EVERY DAY   • tamsulosin (FLOMAX) 0 4 mg Take 1 capsule (0 4 mg total) by mouth daily with dinner   • [DISCONTINUED] glucose blood (OneTouch Ultra) test strip USE TO TEST BLOOD SUGAR TWICE DAILY (Patient taking differently: USE TO TEST BLOOD SUGAR DAILY)   • linaGLIPtin 5 MG TABS Take 5 mg by mouth daily (Patient not taking: Reported on 1/11/2023)   • [DISCONTINUED] solifenacin (VESICARE) 10 MG tablet TAKE ONE TABLET BY MOUTH EVERY DAY (Patient not taking: Reported on 1/11/2023)   • [DISCONTINUED] Triamcinolone Acetonide (Nasacort Allergy) 55 MCG/ACT nasal spray into each nostril (Patient not taking: Reported on 1/11/2023)       Objective     /74   Pulse 71   Temp (!) 97 4 °F (36 3 °C)   Resp 14   Ht 5' 9" (1 753 m)   Wt 105 kg (230 lb 9 6 oz)   SpO2 97%   BMI 34 05 kg/m²     Physical Exam  Rehan Arteaga DO

## 2023-01-11 NOTE — ASSESSMENT & PLAN NOTE
Lab Results   Component Value Date    HGBA1C 6 7 (A) 01/11/2023   A1c is good with the Tradjenta, Glimepiride, metformin XR  A1c is good at 6 7%

## 2023-02-13 DIAGNOSIS — E11.8 TYPE 2 DIABETES MELLITUS WITH COMPLICATION, WITHOUT LONG-TERM CURRENT USE OF INSULIN (HCC): ICD-10-CM

## 2023-02-13 RX ORDER — PIOGLITAZONEHYDROCHLORIDE 30 MG/1
TABLET ORAL
Qty: 90 TABLET | Refills: 1 | Status: SHIPPED | OUTPATIENT
Start: 2023-02-13

## 2023-02-27 DIAGNOSIS — E11.9 TYPE 2 DIABETES MELLITUS WITHOUT COMPLICATION, WITHOUT LONG-TERM CURRENT USE OF INSULIN (HCC): ICD-10-CM

## 2023-02-27 RX ORDER — GLIMEPIRIDE 4 MG/1
TABLET ORAL
Qty: 180 TABLET | Refills: 1 | Status: SHIPPED | OUTPATIENT
Start: 2023-02-27

## 2023-03-06 LAB
LEFT EYE DIABETIC RETINOPATHY: NORMAL
RIGHT EYE DIABETIC RETINOPATHY: NORMAL

## 2023-03-21 DIAGNOSIS — A04.8 H. PYLORI INFECTION: ICD-10-CM

## 2023-03-21 RX ORDER — PANTOPRAZOLE SODIUM 20 MG/1
TABLET, DELAYED RELEASE ORAL
Qty: 90 TABLET | Refills: 1 | Status: SHIPPED | OUTPATIENT
Start: 2023-03-21

## 2023-05-05 ENCOUNTER — APPOINTMENT (OUTPATIENT)
Dept: LAB | Facility: CLINIC | Age: 78
End: 2023-05-05

## 2023-05-05 DIAGNOSIS — E11.8 TYPE 2 DIABETES MELLITUS WITH COMPLICATION, WITHOUT LONG-TERM CURRENT USE OF INSULIN (HCC): ICD-10-CM

## 2023-05-05 LAB
ALBUMIN SERPL BCP-MCNC: 3.9 G/DL (ref 3.5–5)
ALP SERPL-CCNC: 75 U/L (ref 46–116)
ALT SERPL W P-5'-P-CCNC: 33 U/L (ref 12–78)
ANION GAP SERPL CALCULATED.3IONS-SCNC: 1 MMOL/L (ref 4–13)
AST SERPL W P-5'-P-CCNC: 26 U/L (ref 5–45)
BASOPHILS # BLD AUTO: 0.03 THOUSANDS/ÂΜL (ref 0–0.1)
BASOPHILS NFR BLD AUTO: 1 % (ref 0–1)
BILIRUB SERPL-MCNC: 0.65 MG/DL (ref 0.2–1)
BUN SERPL-MCNC: 20 MG/DL (ref 5–25)
CALCIUM SERPL-MCNC: 10.3 MG/DL (ref 8.3–10.1)
CHLORIDE SERPL-SCNC: 104 MMOL/L (ref 96–108)
CHOLEST SERPL-MCNC: 143 MG/DL
CO2 SERPL-SCNC: 28 MMOL/L (ref 21–32)
CREAT SERPL-MCNC: 1.03 MG/DL (ref 0.6–1.3)
EOSINOPHIL # BLD AUTO: 0.3 THOUSAND/ÂΜL (ref 0–0.61)
EOSINOPHIL NFR BLD AUTO: 5 % (ref 0–6)
ERYTHROCYTE [DISTWIDTH] IN BLOOD BY AUTOMATED COUNT: 13 % (ref 11.6–15.1)
GFR SERPL CREATININE-BSD FRML MDRD: 69 ML/MIN/1.73SQ M
GLUCOSE P FAST SERPL-MCNC: 120 MG/DL (ref 65–99)
HCT VFR BLD AUTO: 47.8 % (ref 36.5–49.3)
HDLC SERPL-MCNC: 42 MG/DL
HGB BLD-MCNC: 15.1 G/DL (ref 12–17)
IMM GRANULOCYTES # BLD AUTO: 0.01 THOUSAND/UL (ref 0–0.2)
IMM GRANULOCYTES NFR BLD AUTO: 0 % (ref 0–2)
LDLC SERPL CALC-MCNC: 80 MG/DL (ref 0–100)
LYMPHOCYTES # BLD AUTO: 1.76 THOUSANDS/ÂΜL (ref 0.6–4.47)
LYMPHOCYTES NFR BLD AUTO: 29 % (ref 14–44)
MCH RBC QN AUTO: 29.6 PG (ref 26.8–34.3)
MCHC RBC AUTO-ENTMCNC: 31.6 G/DL (ref 31.4–37.4)
MCV RBC AUTO: 94 FL (ref 82–98)
MONOCYTES # BLD AUTO: 0.47 THOUSAND/ÂΜL (ref 0.17–1.22)
MONOCYTES NFR BLD AUTO: 8 % (ref 4–12)
NEUTROPHILS # BLD AUTO: 3.48 THOUSANDS/ÂΜL (ref 1.85–7.62)
NEUTS SEG NFR BLD AUTO: 57 % (ref 43–75)
NRBC BLD AUTO-RTO: 0 /100 WBCS
PLATELET # BLD AUTO: 315 THOUSANDS/UL (ref 149–390)
PMV BLD AUTO: 11.3 FL (ref 8.9–12.7)
POTASSIUM SERPL-SCNC: 4.6 MMOL/L (ref 3.5–5.3)
PROT SERPL-MCNC: 7.8 G/DL (ref 6.4–8.4)
RBC # BLD AUTO: 5.1 MILLION/UL (ref 3.88–5.62)
SODIUM SERPL-SCNC: 133 MMOL/L (ref 135–147)
TRIGL SERPL-MCNC: 107 MG/DL
WBC # BLD AUTO: 6.05 THOUSAND/UL (ref 4.31–10.16)

## 2023-05-06 LAB
CREAT UR-MCNC: 118 MG/DL
MICROALBUMIN UR-MCNC: 45.8 MG/L (ref 0–20)
MICROALBUMIN/CREAT 24H UR: 39 MG/G CREATININE (ref 0–30)

## 2023-05-07 LAB
EST. AVERAGE GLUCOSE BLD GHB EST-MCNC: 123 MG/DL
HBA1C MFR BLD: 5.9 %

## 2023-05-10 NOTE — RESULT ENCOUNTER NOTE
A1c is good at 5 9   there is a minimal amount of protein in your urine  Sodium is still mildly decreased  We will discuss at our next follow up

## 2023-05-12 ENCOUNTER — OFFICE VISIT (OUTPATIENT)
Dept: INTERNAL MEDICINE CLINIC | Facility: CLINIC | Age: 78
End: 2023-05-12

## 2023-05-12 ENCOUNTER — TELEPHONE (OUTPATIENT)
Dept: OBGYN CLINIC | Facility: OTHER | Age: 78
End: 2023-05-12

## 2023-05-12 ENCOUNTER — APPOINTMENT (OUTPATIENT)
Dept: RADIOLOGY | Facility: CLINIC | Age: 78
End: 2023-05-12

## 2023-05-12 VITALS
RESPIRATION RATE: 12 BRPM | DIASTOLIC BLOOD PRESSURE: 76 MMHG | OXYGEN SATURATION: 96 % | SYSTOLIC BLOOD PRESSURE: 134 MMHG | HEART RATE: 62 BPM | WEIGHT: 233.2 LBS | HEIGHT: 69 IN | TEMPERATURE: 97.1 F | BODY MASS INDEX: 34.54 KG/M2

## 2023-05-12 DIAGNOSIS — G56.02 CARPAL TUNNEL SYNDROME OF LEFT WRIST: Primary | ICD-10-CM

## 2023-05-12 DIAGNOSIS — E11.8 TYPE 2 DIABETES MELLITUS WITH COMPLICATION, WITHOUT LONG-TERM CURRENT USE OF INSULIN (HCC): ICD-10-CM

## 2023-05-12 DIAGNOSIS — M19.071 PRIMARY OSTEOARTHRITIS OF BOTH FEET: ICD-10-CM

## 2023-05-12 DIAGNOSIS — M19.072 PRIMARY OSTEOARTHRITIS OF BOTH FEET: ICD-10-CM

## 2023-05-12 DIAGNOSIS — I10 ESSENTIAL HYPERTENSION: ICD-10-CM

## 2023-05-12 DIAGNOSIS — J30.1 SEASONAL ALLERGIC RHINITIS DUE TO POLLEN: ICD-10-CM

## 2023-05-12 RX ORDER — METFORMIN HYDROCHLORIDE 500 MG/1
TABLET, EXTENDED RELEASE ORAL
Qty: 360 TABLET | Refills: 1 | Status: SHIPPED | OUTPATIENT
Start: 2023-05-12

## 2023-05-12 RX ORDER — FLUTICASONE PROPIONATE 50 MCG
1 SPRAY, SUSPENSION (ML) NASAL DAILY
Qty: 48 G | Refills: 1 | Status: SHIPPED | OUTPATIENT
Start: 2023-05-12 | End: 2023-11-08

## 2023-05-12 RX ORDER — LISINOPRIL 40 MG/1
40 TABLET ORAL DAILY
Qty: 90 TABLET | Refills: 1 | Status: SHIPPED | OUTPATIENT
Start: 2023-05-12

## 2023-05-12 NOTE — ASSESSMENT & PLAN NOTE
Lab Results   Component Value Date    HGBA1C 5 9 (H) 05/05/2023   A1c good with the pioglitazone 30 mg QDay  We will continue the metformin and Glimepiride    Discussed increase of micro-albumin  Increased the Lisinopril

## 2023-05-12 NOTE — TELEPHONE ENCOUNTER
Patient is being referred to a orthopedics  Please schedule accordingly      [address]   [phone]

## 2023-05-12 NOTE — PROGRESS NOTES
Assessment/Plan:    Problem List Items Addressed This Visit        Endocrine    Type 2 diabetes mellitus with complication, without long-term current use of insulin ([facility] )       Lab Results   Component Value Date    HGBA1C 5 9 (H) 05/05/2023   A1c good with the pioglitazone 30 mg QDay  We will continue the metformin and Glimepiride  Discussed increase of micro-albumin  Increased the Lisinopril            Respiratory    Allergic rhinitis     The patient is now taking Flonse 16 g 2 QDay         Relevant Medications    fluticasone (FLONASE) 50 mcg/act nasal spray       Cardiovascular and Mediastinum    Essential hypertension     BP elevated and Lisinopril Daily         Relevant Medications    lisinopril (ZESTRIL) 40 mg tablet   Other Visit Diagnoses     Carpal tunnel syndrome of left wrist    -  Primary    EMG and referal to hand specialty    Relevant Orders    EMG 1 Limb    Ambulatory Referral to Hand Surgery    Primary osteoarthritis of both feet        Xrays of both feet    Relevant Orders    XR foot 3+ vw right    XR foot 3+ vw left           Diagnoses and all orders for this visit:    Carpal tunnel syndrome of left wrist  Comments:  EMG and referal to hand specialty  Orders:  -     EMG 1 Limb; Future  -     Ambulatory Referral to Hand Surgery; Future    Primary osteoarthritis of both feet  Comments:  Xrays of both feet  Orders:  -     XR foot 3+ vw right; Future  -     XR foot 3+ vw left; Future    Type 2 diabetes mellitus with complication, without long-term current use of insulin (HCC)    Essential hypertension  -     lisinopril (ZESTRIL) 40 mg tablet;  Take 1 tablet (40 mg total) by mouth daily    Seasonal allergic rhinitis due to pollen  -     fluticasone (FLONASE) 50 mcg/act nasal spray; 1 spray into each nostril daily      Type 2 diabetes mellitus with complication, without long-term current use of insulin (HCC)    Lab Results   Component Value Date    HGBA1C 5 9 (H) 05/05/2023   A1c good with the pioglitazone 30 mg QDay  We will continue the metformin and Glimepiride  Discussed increase of micro-albumin  Increased the Lisinopril    Allergic rhinitis  The patient is now taking Flonse 16 g 2 QDay    Essential hypertension  BP elevated and Lisinopril Daily    Enlarged prostate with lower urinary tract symptoms (LUTS)  Continue the Flomax  Subjective:      Patient ID: Kobi Rivas is a 69 y o  male  The patient was seen and examined and notes numbness of the left hand 2nd-4th digit  He notes this is severe and episodic  The neutral position does not help  The patient notes bilateral foot pain  The following portions of the patient's history were reviewed and updated as appropriate:   He has a past medical history of Arthritis of knee, right, History of gastroesophageal reflux (GERD), Hypercholesterolemia, Morbid (severe) obesity due to excess calories ([address] ), Papular rash, Pes anserine bursitis, Sebaceous cyst, Testicular hypogonadism, and Tick bite ,  does not have any pertinent problems on file  ,   has a past surgical history that includes Wrist fusion (Bilateral); Elbow surgery (Right); Colonoscopy; and Upper gastrointestinal endoscopy  ,  family history includes Diabetes type II in his mother; Heart disease in his mother; No Known Problems in his father; Rheumatic fever in his mother  ,   reports that he quit smoking about 37 years ago  His smoking use included cigarettes  He has a 20 00 pack-year smoking history  He has never used smokeless tobacco  He reports that he does not currently use alcohol  He reports that he does not use drugs  ,  is allergic to pollen extract and tape [medical tape]     Current Outpatient Medications   Medication Sig Dispense Refill   • aspirin 81 MG tablet Take 81 mg by mouth once       • Coenzyme Q10 10 MG capsule Take 10 mg by mouth daily     • fluticasone (FLONASE) 50 mcg/act nasal spray 1 spray into each nostril daily 48 g 1   • glimepiride (AMARYL) 4 mg "tablet TAKE TWO TABLETS BY MOUTH EVERY  tablet 1   • lisinopril (ZESTRIL) 40 mg tablet Take 1 tablet (40 mg total) by mouth daily 90 tablet 1   • metFORMIN (GLUCOPHAGE-XR) 500 mg 24 hr tablet TAKE FOUR TABLETS BY MOUTH EVERY DAY WITH DINNER 360 tablet 1   • Multiple Vitamin (multivitamin) tablet Take 1 tablet by mouth daily     • Omega-3 1000 MG CAPS Take by mouth     • pantoprazole (PROTONIX) 20 mg tablet TAKE ONE TABLET BY MOUTH EVERY DAY 90 tablet 1   • pioglitazone (ACTOS) 30 mg tablet TAKE ONE TABLET BY MOUTH EVERY DAY 90 tablet 1   • Red Yeast Rice 600 MG CAPS TAKE  BY MOUTH EVERY DAY     • tamsulosin (FLOMAX) 0 4 mg Take 1 capsule (0 4 mg total) by mouth daily with dinner 90 capsule 3   • glucose blood (OneTouch Ultra) test strip USE TO TEST BLOOD SUGAR DAILY (Patient not taking: Reported on 5/12/2023) 200 each 1   • linaGLIPtin 5 MG TABS Take 5 mg by mouth daily (Patient not taking: Reported on 1/11/2023)       No current facility-administered medications for this visit  Review of Systems   Constitutional: Negative for chills, fatigue and fever  HENT: Negative  Respiratory: Negative for cough, chest tightness and shortness of breath  Cardiovascular: Negative for chest pain and palpitations  Gastrointestinal: Negative for abdominal pain, constipation, diarrhea, nausea and vomiting  Genitourinary: Negative  Musculoskeletal: Negative for back pain and myalgias  Skin: Negative  Neurological: Negative  Psychiatric/Behavioral: Negative for dysphoric mood  The patient is not nervous/anxious  Objective:  Vitals:    05/12/23 0719   BP: 134/76   Pulse: 62   Resp: 12   Temp: (!) 97 1 °F (36 2 °C)   SpO2: 96%   Weight: 106 kg (233 lb 3 2 oz)   Height: 5' 9\" (1 753 m)     Body mass index is 34 44 kg/m²  Physical Exam  Vitals and nursing note reviewed  Constitutional:       Appearance: He is well-developed  HENT:      Head: Normocephalic and atraumatic     Eyes:      " Pupils: Pupils are equal, round, and reactive to light  Cardiovascular:      Rate and Rhythm: Normal rate and regular rhythm  Heart sounds: Normal heart sounds  No murmur heard  Pulmonary:      Effort: Pulmonary effort is normal       Breath sounds: Normal breath sounds  No stridor  No rales  Abdominal:      General: Bowel sounds are normal  There is no distension  Palpations: Abdomen is soft  Tenderness: There is no abdominal tenderness  Musculoskeletal:         General: No deformity  Normal range of motion  Cervical back: Normal range of motion and neck supple  Skin:     General: Skin is warm and dry  Neurological:      Mental Status: He is alert and oriented to person, place, and time            PHQ-2/9 Depression Screening

## 2023-05-15 ENCOUNTER — TELEPHONE (OUTPATIENT)
Dept: INTERNAL MEDICINE CLINIC | Facility: CLINIC | Age: 78
End: 2023-05-15

## 2023-05-15 DIAGNOSIS — M21.612 BILATERAL BUNIONS: Primary | ICD-10-CM

## 2023-05-15 DIAGNOSIS — M21.611 BILATERAL BUNIONS: Primary | ICD-10-CM

## 2023-05-15 DIAGNOSIS — M77.32 HEEL SPUR, LEFT: ICD-10-CM

## 2023-05-17 ENCOUNTER — OFFICE VISIT (OUTPATIENT)
Dept: PODIATRY | Facility: CLINIC | Age: 78
End: 2023-05-17

## 2023-05-17 VITALS — WEIGHT: 233 LBS | HEIGHT: 69 IN | BODY MASS INDEX: 34.51 KG/M2

## 2023-05-17 DIAGNOSIS — M76.71 PERONEAL TENDINITIS OF RIGHT LOWER EXTREMITY: ICD-10-CM

## 2023-05-17 DIAGNOSIS — M77.32 HEEL SPUR, LEFT: ICD-10-CM

## 2023-05-17 DIAGNOSIS — M21.611 BILATERAL BUNIONS: ICD-10-CM

## 2023-05-17 DIAGNOSIS — M19.90 OSTEOARTHRITIS OF MIDFOOT DUE TO INFLAMMATORY ARTHRITIS: ICD-10-CM

## 2023-05-17 DIAGNOSIS — M19.279 OSTEOARTHRITIS OF MIDFOOT DUE TO INFLAMMATORY ARTHRITIS: ICD-10-CM

## 2023-05-17 DIAGNOSIS — M21.612 BILATERAL BUNIONS: ICD-10-CM

## 2023-05-17 DIAGNOSIS — M24.573 EQUINUS CONTRACTURE OF ANKLE: Primary | ICD-10-CM

## 2023-05-17 DIAGNOSIS — M21.172 ACQUIRED LEFT REARFOOT VARUS: ICD-10-CM

## 2023-05-17 DIAGNOSIS — M72.2 PLANTAR FASCIITIS: ICD-10-CM

## 2023-05-17 DIAGNOSIS — M76.72 PERONEAL TENDINITIS OF LEFT LOWER EXTREMITY: ICD-10-CM

## 2023-05-17 NOTE — PATIENT INSTRUCTIONS
Carbon fiber foot plate  [facility]    Achilles Tendinitis   AMBULATORY CARE:   Achilles tendinitis  is swelling of the tendon that connects your calf muscle to your heel bone  It may happen suddenly or become a chronic condition  Your risk for Achilles tendinitis increases as you age  Contact your healthcare provider if:   You have a fever  Your swelling or pain gets worse  You feel or hear a sudden pop near your ankle  You cannot bend your ankle or put pressure on your leg  You have questions about your condition or care  Common signs and symptoms include the following:   Pain in your heel that gets worse with activity    Swelling in your heel or calf    Stiffness in your heel or calf    Treatment of Achilles tendinitis  may include medicine, physical therapy, or support devices  You may need surgery or other procedures if your Achilles tendinitis does not get better with other treatments  NSAIDs , such as ibuprofen, help decrease swelling, pain, and fever  This medicine is available with or without a doctor's order  NSAIDs can cause stomach bleeding or kidney problems in certain people  If you take blood thinner medicine, always ask your healthcare provider if NSAIDs are safe for you  Always read the medicine label and follow directions  Take your medicine as directed  Contact your healthcare provider if you think your medicine is not helping or if you have side effects  Tell your provider if you are allergic to any medicine  Keep a list of the medicines, vitamins, and herbs you take  Include the amounts, and when and why you take them  Bring the list or the pill bottles to follow-up visits  Carry your medicine list with you in case of an emergency  Manage your Achilles tendinitis:   Rest  as directed  Rest decreases swelling and prevents your tendinitis from getting worse  Your healthcare provider may tell you to stop your usual training or exercise activities   Ask when you can return to your normal activities or exercise plan  Apply ice  on your Achilles tendon for 15 to 20 minutes every hour or as directed  Use an ice pack, or put crushed ice in a plastic bag  Cover it with a towel  Ice helps prevent tissue damage and decreases swelling and pain  Wear a compression bandage or use tape  as directed  This will decrease swelling and pain  Ask your healthcare provider how to wrap a compression bandage or apply tape  If you use a support device ask if you should wear a compression bandage or use tape  Elevate  your heel above the level of your heart as often as you can  This will help decrease swelling and pain  Prop your heel on pillows or blankets to keep it elevated comfortably  Stretch  as directed when you return to your exercise program  Always warm up your muscles and stretch before you exercise  Do cool down exercises and stretches when you are finished  This will keep your muscles loose and decrease stress on your Achilles tendon  Do bilateral heel drop exercises as directed  Bilateral heel drops strengthen your Achilles tendon  Do not do the following exercise unless your healthcare provider says it is safe:    Stand at the edge of a stair or raised step  Hold onto the railing for balance  Place the front part of your foot on the stair or step  Let the back of your foot hang off of the stair or step  Slowly lift your heels off the ground and then slowly lower your heels past the stair  Do not move your heels quickly  This could make your injury worse  Repeat this exercise 20 times or as directed  Slowly increase the time and intensity when you return to your exercise program   Start with short and low intensity exercises  Ask your healthcare provider how and when to increase the time and intensity of your exercise  Wear support devices or supportive shoes as directed  Support devices may include a splint, orthotic, or brace   These devices will decrease pressure on your Achilles tendon and help relieve pain  Supportive shoes will cushion your heel and protect your Achilles tendon  Replace shoes or sneakers that are worn out  Go to physical therapy and practice exercises as directed:  A physical therapist teaches you exercises to help improve movement and strength, and decrease pain  Practice these exercises at home as directed  Follow up with your doctor as directed:  Write down your questions so you remember to ask them during your visits  © Copyright [employer] 2022 Information is for End User's use only and may not be sold, redistributed or otherwise used for commercial purposes  The above information is an  only  It is not intended as medical advice for individual conditions or treatments  Talk to your doctor, nurse or pharmacist before following any medical regimen to see if it is safe and effective for you  Plantar Fasciitis   WHAT YOU NEED TO KNOW:   Plantar fasciitis is swelling of the plantar fascia  The plantar fascia is a thick band of tissue that connects your heel bone to your toes  This part of your foot helps support the arch of your foot and absorbs shock  DISCHARGE INSTRUCTIONS:   Call your doctor if:   Your pain or swelling suddenly increases  You develop knee, hip, or back pain  You have questions or concerns about your condition or care  Medicines: You may  need any of the following:  Acetaminophen  decreases pain and fever  It is available without a doctor's order  Ask how much to take and how often to take it  Follow directions  Read the labels of all other medicines you are using to see if they also contain acetaminophen, or ask your doctor or pharmacist  Acetaminophen can cause liver damage if not taken correctly  NSAIDs , such as ibuprofen, help decrease swelling, pain, and fever  NSAIDs can cause stomach bleeding or kidney problems in certain people   If you take blood thinner medicine, always ask your healthcare provider if NSAIDs are safe for you  Always read the medicine label and follow directions  Take your medicine as directed  Contact your healthcare provider if you think your medicine is not helping or if you have side effects  Tell your provider if you are allergic to any medicine  Keep a list of the medicines, vitamins, and herbs you take  Include the amounts, and when and why you take them  Bring the list or the pill bottles to follow-up visits  Carry your medicine list with you in case of an emergency  Self-care:   Wear your splint or shoe inserts as directed  You may need to wear a splint at night to keep your foot stretched while you sleep  This will help prevent sharp pain first thing in the morning  Shoe inserts will help decrease stress on your plantar fascia when you walk or exercise  Apply ice on your plantar fascia  Ice helps decrease swelling and pain  Fill a water bottle with water and freeze it  Wrap a towel around the bottle or cover it with a pillow case  Roll the water bottle under your foot for 10 minutes each morning and evening  Massage your plantar fascia as directed  This may help decrease swelling and pain  Roll a golf ball under your foot for 10 minutes  Repeat 3 times each day  Go to physical therapy as directed  A physical therapist teaches you exercises to help improve movement and strength, and to decrease pain  Prevent plantar fasciitis:   Maintain a healthy weight  This will help decrease stress on your feet  Ask your healthcare provider what a healthy weight is for you  Ask him or her to help you create a weight loss plan, if needed  Do low-impact exercises  Low-impact exercises decrease stress on your plantar fascia  Examples include swimming or bicycling  Start new activities slowly  Increase the intensity and time gradually  Wear shoes that fit well and support your arch    Replace your shoes before the padding or shock "absorption wears out  Do not walk or  bare feet or sandals for long periods of time  Follow up with your doctor as directed:  Write down your questions so you remember to ask them during your visits  © Copyright [employer] Duran 2022 Information is for End User's use only and may not be sold, redistributed or otherwise used for commercial purposes  The above information is an  only  It is not intended as medical advice for individual conditions or treatments  Talk to your doctor, nurse or pharmacist before following any medical regimen to see if it is safe and effective for you  What is metatarsus adductus    Metatarsus adductus, also known as metatarsus varus, is a common foot deformity noted at birth that causes the front half of the foot, or forefoot, to turn inward  Metatarsus adductus may also be referred to as \"flexible\" (the foot can be straightened to a degree by hand) or \"nonflexible\" (the foot cannot be straightened by hand)  What causes metatarsus adductus? The cause of metatarsus adductus is not known  It occurs in approximately 1 to 2 per 1,000 live births and is more common in first born children  Babies born with metatarsus adductus rarely need treatment as they grow  They may, however, be at increased risk for developmental dysplasia of the hip, a condition of the hip joint in which the top of the thigh (femur) slips in and out of its socket, because the socket is too shallow to keep the joint intact  How is metatarsus adductus diagnosed? A doctor makes the diagnosis of metatarsus adductus with a physical examination  During the examination, the doctor will obtain a complete birth history of the child and ask if other family members were known to have metatarsus adductus  Diagnostic procedures are not usually necessary to evaluate metatarsus adductus   However, X-rays (a diagnostic test that uses invisible electromagnetic energy beams to produce images of internal " tissues, bones, and organs onto film) of the feet are often done in the case of nonflexible metatarsus adductus  An infant with metatarsus adductus has a high arch and the big toe has a wide separation from the second toe and deviates inward  Flexible metatarsus adductus is diagnosed if the heel and forefoot can be aligned with each other with gentle pressure on the forefoot while holding the heel steady  This technique is known as passive manipulation  If the forefoot is more difficult to align with the heel, it is considered a nonflexible, or stiff foot  Treatment for metatarsus adductus  Specific treatment for metatarsus adductus will be determined by your child's doctor based on: Your child's age, overall health, and medical history  The extent of the condition  Your child's tolerance for specific medications, procedures, or therapies  Expectations for the course of the condition  Your opinion or preference    The goal of treatment is to straighten the position of the forefoot and heel  Treatment options vary for infants, and may include:  Observation, for those with a supple, or flexible, forefoot  stretching or passive manipulation exercises  casts  surgery    Studies have shown that metatarsus adductus may resolve spontaneously (without treatment) in the majority of affected children  Your child's doctor or nurse may instruct you on how to perform passive manipulation exercises on your child's feet during diaper changes  A change in sleeping positions may also be recommended  Suggestions may include side-lying positioning  In rare instances, the foot does not respond to the stretching program, long leg casts may be applied  Casts are used to help stretch the soft tissues of the forefoot  The plaster casts are changed every 1 to 2 weeks by your child's pediatric orthopaedist   If the foot responds to casting, straight cast shoes may be prescribed to help hold the forefoot in place   Straight last shoes are made without a curve in the bottom of the shoe  For those infants with very rigid or severe metatarsus adductus, surgery may be required to release the forefoot joints  Following surgery, casts are applied to hold the forefoot in place as it heals  Long-term outlook for a child with metatarsus adductus  Metatarsus adductus is a common problem with more than 90% resolving on their own  When needed treatment will depend on the degree of flexibility in the affected foot

## 2023-05-17 NOTE — PROGRESS NOTES
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    Plantar fasciitis    Acquired left rearfoot varus       -X-rays 3 views taken reviewed the bilateral feet the right does show significant metatarsus adductus but the left is worse  There is degenerative change diffusely across the forefoot but also at the first TMT J, there is calcaneal spurring on the left with presence of rear foot varus as well  - She is having significant cavus foot type which is likely leading to overload of the plantar fascia and also the forefoot which is causing diffuse metatarsalgia and forefoot pain  - In reality most of this is stemming from his severe equinus, will place in physical therapy for aggressive management of his equinus to bilateral calves  - Stretching exercises given and recommended and dispensed  - We will consider injection on the plantar aspect of his left heel and 6 weeks after follow-up from physical therapy  - Rx given for diclofenac gel for management of his inflammatory pain  - Advised use of carbon fiber footplate and rigid shoes at all time he is to wear shoes at all times in the house  We discussed belen that shoes were former brace and can prevent overload of the midfoot  Subjective:      Patient ID: Soni Yepez is a 69 y o  male  Patient presents for evaluation management of bilateral foot pain, he was told he has arthritis in his bilateral feet and there he could only fuse this and there is nothing else that they could do  He is here reporting first up in the morning heel pain, worsening pain throughout the day  "Swelling on the dorsal foot  Pain on the outside of the foot as the day goes on and pulling on the back of his calves      The following portions of the patient's history were reviewed and updated as appropriate: allergies, current medications, past family history, past medical history, past social history, past surgical history and problem list     Review of Systems   Constitutional: Negative for chills and fever  HENT: Negative for ear pain and sore throat  Eyes: Negative for pain and visual disturbance  Respiratory: Negative for cough and shortness of breath  Cardiovascular: Negative for chest pain and palpitations  Gastrointestinal: Negative for abdominal pain and vomiting  Genitourinary: Negative for dysuria and hematuria  Musculoskeletal: Negative for arthralgias and back pain  Skin: Negative for color change and rash  Neurological: Negative for seizures and syncope  All other systems reviewed and are negative  Objective:      Ht 5' 9\" (1 753 m)   Wt 106 kg (233 lb)   BMI 34 41 kg/m²          Physical Exam  Vitals reviewed  Constitutional:       Appearance: Normal appearance  He is obese  HENT:      Head: Normocephalic and atraumatic  Mouth/Throat:      Mouth: Mucous membranes are moist    Eyes:      Pupils: Pupils are equal, round, and reactive to light  Musculoskeletal:      Comments: Significant deformities bilaterally, there is pain with palpation of the origin plantar fascia left foot  Pes cavus orientation bilateral foot  Pain with palpation along the peroneal tendons, there is severe equinus bilaterally and I did not able to get to -10 with knee extended about 0 with knee bent  There is pain with range of motion of the first TMT J bilaterally significant metatarsus adductus on exam   Skin:     Capillary Refill: Capillary refill takes 2 to 3 seconds  Neurological:      General: No focal deficit present        Mental Status: He is alert and oriented to " person, place, and time  Mental status is at baseline

## 2023-05-22 NOTE — PROGRESS NOTES
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  M19 279 Ambulatory referral to Physical Therapy    M19 90       5  Plantar fasciitis  M72 2 Ambulatory referral to Physical Therapy                     Assessment  Assessment details: Moon Juan is a pleasant 69 y o  presenting to physical therapy with MD referral for Equinus contracture of ankle, Peroneal tendinitis of left lower extremity,  Peroneal tendinitis of right lower extremity, 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, and Plantar fasciitis  Problem list:  Limited ankle/foot ROM, decreased hip/core strength, limited lower extremity flexibility, abnormal gait    Treatment to include: Manual therapy techniques, lower extremity/core strengthening, neuromuscular control exercises, balance/proprioception training, gait training as needed, instruction in a comprehensive HEP, and modalities as needed  This pt would benefit from skilled PT services to address their impairments and functional limitations to maximize functional outcome  Symptom irritability: moderateBarriers to therapy: Chronicity of symptoms, knee pain, foot OA, BMI > 34  Understanding of Dx/Px/POC: good   Prognosis: good    Goals  ST  Pt will improve SLS to at least 10 seconds in 3  weeks  2  Pt will improve ankle DF to at least 10 degrees in 3 weeks  LT  Pt will be able to take first few steps in the morning with minimal to no pain in 6 weeks    2  Pt will be independent in a comprehensive HEP in 6 weeks  Plan  Plan details: 2-3 x per week for 6 weeks  Patient would benefit from: skilled physical therapy  Frequency: 2x week  Duration in weeks: 6  Plan of Care beginning date: 2023  Plan of Care expiration date: 2023  Treatment plan discussed with: patient        Subjective Evaluation    History of Present Illness  Mechanism of injury: Patient reports onset of pain in bilateral feet for the past 6 months to a year  He has been experiencing increased pain the past few months causing him to see a podiatrist  Pt is a  and experiences increased pain in bilateral feet with prolonged walking/standing  The podiatrist gave pt night splints for BLEs which he has been compliant in wearing  Right foot pain is located on dorsal aspect of foot as well as plantar fascia, Left foot pain is only in plantar fascia  Pt denies any numbness or tingling in bilateral feet  Premorbid status:  - ADLs: Independent with no difficulty  - Work: Part time, Full duty-   - Recreation: walking    Current status:  - ADLs/Functional activities:   - Stairs Step to pattern with Pain Levels: mild pain (knee limits stairs)   - Sit to stand with no pain   - First fews steps after prolonged sitting with severe pain   - Walking 1 mile prior to increase in pain when wearing his [employer] shoes   - Standing 15-20 minutes prior to increase in pain   - Sitting unlimited without pain   - Sleeping with 0 nightly sleep disturbances due to pain  - Work: Part time, Full duty  - Recreation: walking  Pain  Current pain ratin  At best pain ratin  At worst pain ratin  Location: Dorsal aspect of right foot and plantar fascia of B feet  Quality: sharp, tight and discomfort  Aggravating factors: walking, standing and stair climbing  Progression: worsening      Diagnostic Tests  X-ray: abnormal  Treatments  Treatments tried: none    Current treatment: physical therapy  Patient Goals  Patient goals for "therapy: decreased pain, increased motion and increased strength          Objective     Palpation     Additional Palpation Details  TTP over bilateral plantar fascia, calcaneal tubercles, and proximal gastroc on L    Active Range of Motion   Left Ankle/Foot   Dorsiflexion (ke): 7 degrees   Plantar flexion: 35 degrees   Inversion: 15 degrees   Eversion: 5 degrees     Right Ankle/Foot   Dorsiflexion (ke): 4 degrees   Plantar flexion: 25 degrees   Inversion: 24 degrees   Eversion: 5 degrees     Passive Range of Motion   Left Ankle/Foot    Dorsiflexion (ke): 9 degrees   Plantar flexion: 45 degrees   Inversion: 30 degrees   Great toe extension: 65 degrees     Right Ankle/Foot    Dorsiflexion (ke): 7 degrees   Plantar flexion: 50 degrees   Inversion: 40 degrees   Great toe extension: 50 degrees     Joint Play   Left Ankle/Foot  Hypomobile in the talocrural joint, subtalar joint and midfoot  Right Ankle/Foot  Hypomobile in the talocrural joint, subtalar joint and midfoot  Strength/Myotome Testing     Left Hip   Planes of Motion   Flexion: 4+  External rotation: 4+    Right Hip   Planes of Motion   Flexion: 4  External rotation: 4+    Left Knee   Flexion: 5  Extension: 5    Right Knee   Flexion: 4+  Extension: 4+    Left Ankle/Foot   Dorsiflexion: 5  Plantar flexion: 5  Inversion: 4  Eversion: 5    Right Ankle/Foot   Dorsiflexion: 5  Plantar flexion: 5  Inversion: 4+  Eversion: 4+    Additional Strength Details  SLS L: 4\"  SLS R: 1\"    Gait: Pt ambulates over level surface with decreased stride lengths bilaterally lacking toe off bilaterally with no assistive device               Precautions: knee pain  Access Code: [email or web address] (5-23-23)    POC expires: 7-7-23  Next RE due: 6-22-23    Manuals 5-23 (IE)            IASTM to B plantar fascia NV            Massage roller to B gastrocs NV            TCJ mobilizations to B NV                         Neuro Re-Ed             Seated:             - doming 10 x 10\" NV            - " "towel scrunches 2 mins NV                         Standing:             - SLS 15\" x 3 NV            - hip 3 way (limit UE support) 2 x 10 ea NV            Ther Ex             NuStep 8 mins, L4 NV                         Standing:             - SB gastroc stretch 4 x 30\" NV            - SB soleus stretch 4 x 30\" NV                         Seated:             - plantar fascia stretch with towel 4 x 30\" ea NV                         Ther Activity                                       Gait Training                                       Modalities             Cryo as needed                            * On initial evaluation, educated pt on anatomy, pathology, and exercise rationale  Provided pt with basic HEP and ensured proper exercise performance  Educated pt to call with any questions or concerns  Educated pt on proper footwear choices and water bottle ice massage    "

## 2023-05-23 ENCOUNTER — EVALUATION (OUTPATIENT)
Dept: PHYSICAL THERAPY | Facility: CLINIC | Age: 78
End: 2023-05-23

## 2023-05-23 DIAGNOSIS — M76.71 PERONEAL TENDINITIS OF RIGHT LOWER EXTREMITY: ICD-10-CM

## 2023-05-23 DIAGNOSIS — M72.2 PLANTAR FASCIITIS: ICD-10-CM

## 2023-05-23 DIAGNOSIS — M19.90 OSTEOARTHRITIS OF MIDFOOT DUE TO INFLAMMATORY ARTHRITIS: ICD-10-CM

## 2023-05-23 DIAGNOSIS — M19.279 OSTEOARTHRITIS OF MIDFOOT DUE TO INFLAMMATORY ARTHRITIS: ICD-10-CM

## 2023-05-23 DIAGNOSIS — M24.573 EQUINUS CONTRACTURE OF ANKLE: ICD-10-CM

## 2023-05-23 DIAGNOSIS — M76.72 PERONEAL TENDINITIS OF LEFT LOWER EXTREMITY: ICD-10-CM

## 2023-05-24 ENCOUNTER — TELEPHONE (OUTPATIENT)
Dept: PODIATRY | Facility: CLINIC | Age: 78
End: 2023-05-24

## 2023-05-24 DIAGNOSIS — M19.279 OSTEOARTHRITIS OF MIDFOOT DUE TO INFLAMMATORY ARTHRITIS: Primary | ICD-10-CM

## 2023-05-24 DIAGNOSIS — M19.90 OSTEOARTHRITIS OF MIDFOOT DUE TO INFLAMMATORY ARTHRITIS: Primary | ICD-10-CM

## 2023-05-24 NOTE — TELEPHONE ENCOUNTER
Caller: [facility]    Doctor: Steffen Marcelino    Reason for call: 90 day supply of Voltaren 1% sent to [facility]    Call back#: [phone]

## 2023-05-25 NOTE — PROGRESS NOTES
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  M19 279     M19 90                      Subjective: Pt reports he has been trying to perform his HEP at home  Objective: See treatment diary below      Assessment: Initiated exercises this date to address impairments noted during initial evaluation  Pt was able to tolerate all exercises without reports of increase in pain/discomfort  Tolerated treatment well  Patient demonstrated fatigue post treatment, exhibited good technique with therapeutic exercises and would benefit from continued PT      Plan: Progress treatment as tolerated         Precautions: knee pain  Access Code: [email or web address] (23)    POC expires: 23  Next RE due: 23    Manuals  (IE)            IASTM to B plantar fascia NV JG           Massage roller to B gastrocs NV JG           TCJ mobilizations to B NV JG, grade IV                        Neuro Re-Ed             Seated:             - doming 10 x 10\" NV            - towel scrunches 2 mins NV                         Standing:             - SLS 15\" x 3 NV            - hip 3 way (limit UE support) 2 x 10 ea NV            Ther Ex             NuStep 8 mins, L4 NV 8 mins,L5                        Standing:             - SB gastroc stretch 4 x 30\" NV 4 x 30\"           - SB soleus stretch 4 x 30\" NV 4 x 30\"                        Seated:             - plantar fascia stretch with towel 4 x 30\" ea NV 4 x 30\"                        Ther Activity                                       Gait Training                                       Modalities  " Cryo as needed                             * 40 minutes 1:1 time this date

## 2023-05-26 ENCOUNTER — OFFICE VISIT (OUTPATIENT)
Dept: PHYSICAL THERAPY | Facility: CLINIC | Age: 78
End: 2023-05-26

## 2023-05-26 DIAGNOSIS — M19.90 OSTEOARTHRITIS OF MIDFOOT DUE TO INFLAMMATORY ARTHRITIS: ICD-10-CM

## 2023-05-26 DIAGNOSIS — M19.279 OSTEOARTHRITIS OF MIDFOOT DUE TO INFLAMMATORY ARTHRITIS: ICD-10-CM

## 2023-05-26 DIAGNOSIS — M76.72 PERONEAL TENDINITIS OF LEFT LOWER EXTREMITY: ICD-10-CM

## 2023-05-26 DIAGNOSIS — M76.71 PERONEAL TENDINITIS OF RIGHT LOWER EXTREMITY: ICD-10-CM

## 2023-05-26 DIAGNOSIS — M24.573 EQUINUS CONTRACTURE OF ANKLE: ICD-10-CM

## 2023-05-26 DIAGNOSIS — M72.2 PLANTAR FASCIITIS: Primary | ICD-10-CM

## 2023-05-30 ENCOUNTER — OFFICE VISIT (OUTPATIENT)
Dept: OBGYN CLINIC | Facility: CLINIC | Age: 78
End: 2023-05-30

## 2023-05-30 ENCOUNTER — OFFICE VISIT (OUTPATIENT)
Dept: PHYSICAL THERAPY | Facility: CLINIC | Age: 78
End: 2023-05-30

## 2023-05-30 VITALS
HEART RATE: 73 BPM | SYSTOLIC BLOOD PRESSURE: 156 MMHG | HEIGHT: 69 IN | BODY MASS INDEX: 34.07 KG/M2 | DIASTOLIC BLOOD PRESSURE: 73 MMHG | WEIGHT: 230 LBS

## 2023-05-30 DIAGNOSIS — M24.573 EQUINUS CONTRACTURE OF ANKLE: ICD-10-CM

## 2023-05-30 DIAGNOSIS — M72.2 PLANTAR FASCIITIS: Primary | ICD-10-CM

## 2023-05-30 DIAGNOSIS — M76.72 PERONEAL TENDINITIS OF LEFT LOWER EXTREMITY: ICD-10-CM

## 2023-05-30 DIAGNOSIS — M19.90 OSTEOARTHRITIS OF MIDFOOT DUE TO INFLAMMATORY ARTHRITIS: ICD-10-CM

## 2023-05-30 DIAGNOSIS — G56.02 CARPAL TUNNEL SYNDROME OF LEFT WRIST: Primary | ICD-10-CM

## 2023-05-30 DIAGNOSIS — M76.71 PERONEAL TENDINITIS OF RIGHT LOWER EXTREMITY: ICD-10-CM

## 2023-05-30 DIAGNOSIS — M19.279 OSTEOARTHRITIS OF MIDFOOT DUE TO INFLAMMATORY ARTHRITIS: ICD-10-CM

## 2023-05-30 NOTE — PROGRESS NOTES
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  M19 279     M19 90                      Subjective: Patient reports improvement in symptoms following previous session  Objective: See treatment diary below      Assessment: Progressed exercises this date as charted to enhance functional strength and balance  Pt was able to tolerate all exercises with minimal increase in pain/discomfort  Tolerated treatment well  Patient demonstrated fatigue post treatment, exhibited good technique with therapeutic exercises and would benefit from continued PT      Plan: Progress treatment as tolerated         Precautions: knee pain  Access Code: [email or web address] (23)    POC expires: 23  Next RE due: 23    Manuals  (IE)           IASTM to B plantar fascia NV JG JG          Massage roller to B gastrocs NV JG JG          TCJ mobilizations to B NV JG, grade IV JG, grade IV                       Neuro Re-Ed             Seated:             - doming 10 x 10\" NV 10 x 10\" 10 x 10\"          - towel scrunches 2 mins NV 2 mins 2 mins                       Standing:             - SLS 15\" x 3 NV            - hip 3 way (limit UE support) 2 x 10 ea NV  1 x 10 ea          Ther Ex             NuStep 8 mins, L4 NV 8 mins,L5 10 mins, L5                       Standing:             - SB gastroc stretch 4 x 30\" NV 4 x 30\" 4 x 30\"          - SB soleus stretch 4 x 30\" NV 4 x 30\" 4 x 30\"                       Seated:             - plantar fascia stretch with towel 4 x 30\" ea NV 4 x 30\" 4 x 30\" ea                        Ther Activity      " Gait Training                                       Modalities             Cryo as needed                              * 38 minutes 1:1 time this date

## 2023-06-01 ENCOUNTER — TELEPHONE (OUTPATIENT)
Dept: PODIATRY | Facility: CLINIC | Age: 78
End: 2023-06-01

## 2023-06-01 ENCOUNTER — TELEPHONE (OUTPATIENT)
Dept: OBGYN CLINIC | Facility: CLINIC | Age: 78
End: 2023-06-01

## 2023-06-01 ENCOUNTER — OFFICE VISIT (OUTPATIENT)
Dept: PHYSICAL THERAPY | Facility: CLINIC | Age: 78
End: 2023-06-01

## 2023-06-01 DIAGNOSIS — M19.279 OSTEOARTHRITIS OF MIDFOOT DUE TO INFLAMMATORY ARTHRITIS: ICD-10-CM

## 2023-06-01 DIAGNOSIS — M19.90 OSTEOARTHRITIS OF MIDFOOT DUE TO INFLAMMATORY ARTHRITIS: ICD-10-CM

## 2023-06-01 DIAGNOSIS — M24.573 EQUINUS CONTRACTURE OF ANKLE: ICD-10-CM

## 2023-06-01 DIAGNOSIS — M72.2 PLANTAR FASCIITIS: Primary | ICD-10-CM

## 2023-06-01 DIAGNOSIS — M76.71 PERONEAL TENDINITIS OF RIGHT LOWER EXTREMITY: ICD-10-CM

## 2023-06-01 DIAGNOSIS — M76.72 PERONEAL TENDINITIS OF LEFT LOWER EXTREMITY: ICD-10-CM

## 2023-06-01 NOTE — PROGRESS NOTES
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  M19 279     M19 90                      Subjective: Pt reports noticed improvement B feet, less pain since starting therapy  Objective: See treatment diary below      Assessment: Tolerated treatment well  Hip 3 way without UE support is challenging for patient  Patient demonstrated fatigue post treatment, exhibited good technique with therapeutic exercises and would benefit from continued PT      Plan: Continue per plan of care        Precautions: knee pain  Access Code: [email or web address] (23)    POC expires: 23  Next RE due: 23    Manuals  (IE)          IASTM to B plantar fascia NV JG JG MJC         Massage roller to B gastrocs NV JG JG MJC         TCJ mobilizations to B NV JG, grade IV JG, grade IV                       Neuro Re-Ed             Seated:             - doming 10 x 10\" NV 10 x 10\" 10 x 10\" 10 x10\"         - towel scrunches 2 mins NV 2 mins 2 mins 2 mins                      Standing:             - SLS 15\" x 3 NV            - hip 3 way (limit UE support) 2 x 10 ea NV  1 x 10 ea 1x10 ea         Ther Ex             NuStep 8 mins, L4 NV 8 mins,L5 10 mins, L5 L5 x10 min                      Standing:             - SB gastroc stretch 4 x 30\" NV 4 x 30\" 4 x 30\" 4 x30\"         - SB soleus stretch 4 x 30\" NV 4 x 30\" 4 x 30\" 4 x30\"                      Seated:             - plantar fascia stretch with towel 4 x 30\" ea NV 4 x 30\" 4 x 30\" ea  4 x30\"  ea                      Ther Activity                                       Gait Training     " Modalities             Cryo as needed

## 2023-06-01 NOTE — TELEPHONE ENCOUNTER
Caller: Patient     Doctor/Office: Dr Lang     Call regarding :  Calling about EMG      Call was transferred to: [employer]

## 2023-06-02 ENCOUNTER — TELEPHONE (OUTPATIENT)
Dept: OBGYN CLINIC | Facility: HOSPITAL | Age: 78
End: 2023-06-02

## 2023-06-02 NOTE — TELEPHONE ENCOUNTER
Caller: Patient     Doctor: elsa     Reason for call: Patient wanted to make sure he was scheduled for an EMG   Patient is scheduled for 10/3

## 2023-06-06 ENCOUNTER — OFFICE VISIT (OUTPATIENT)
Dept: PHYSICAL THERAPY | Facility: CLINIC | Age: 78
End: 2023-06-06
Payer: COMMERCIAL

## 2023-06-06 DIAGNOSIS — M19.90 OSTEOARTHRITIS OF MIDFOOT DUE TO INFLAMMATORY ARTHRITIS: ICD-10-CM

## 2023-06-06 DIAGNOSIS — M19.279 OSTEOARTHRITIS OF MIDFOOT DUE TO INFLAMMATORY ARTHRITIS: ICD-10-CM

## 2023-06-06 DIAGNOSIS — M76.71 PERONEAL TENDINITIS OF RIGHT LOWER EXTREMITY: ICD-10-CM

## 2023-06-06 DIAGNOSIS — M24.573 EQUINUS CONTRACTURE OF ANKLE: ICD-10-CM

## 2023-06-06 DIAGNOSIS — M76.72 PERONEAL TENDINITIS OF LEFT LOWER EXTREMITY: ICD-10-CM

## 2023-06-06 DIAGNOSIS — M72.2 PLANTAR FASCIITIS: Primary | ICD-10-CM

## 2023-06-06 PROCEDURE — 97140 MANUAL THERAPY 1/> REGIONS: CPT

## 2023-06-06 PROCEDURE — 97110 THERAPEUTIC EXERCISES: CPT

## 2023-06-06 PROCEDURE — 97112 NEUROMUSCULAR REEDUCATION: CPT

## 2023-06-06 NOTE — PROGRESS NOTES
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  M19 279     M19 90                      Subjective: Pt reported that his feet have been feeling better  His chief complaint is the discomfort he has on the dorsi side of his R foot after heavy activity  Objective: See treatment diary below      Assessment: Tolerated treatment well  Patient demonstrated fatigue post treatment, exhibited good technique with therapeutic exercises and would benefit from continued PT  No adverse affect to IASTM post Tx  Plan: Continue per plan of care  Progress treatment as tolerated         Precautions: knee pain  Access Code: [email or web address] (23)    POC expires: 23  Next RE due: 23    Manuals  (IE)         IASTM to B plantar fascia NV JG JG MJC TM        Massage roller to B gastrocs NV JG JG MJC TM        TCJ mobilizations to B NV JG, grade IV JG, grade IV                       Neuro Re-Ed             Seated:             - doming 10 x 10\" NV 10 x 10\" 10 x 10\" 10 x10\" 10 x10\"        - towel scrunches 2 mins NV 2 mins 2 mins 2 mins 2 mins                     Standing:             - SLS 15\" x 3 NV            - hip 3 way (limit UE support) 2 x 10 ea NV  1 x 10 ea 1x10 ea 1x10 ea        Ther Ex             NuStep 8 mins, L4 NV 8 mins,L5 10 mins, L5 L5 x10 min L5 x10 min                     Standing:             - SB gastroc stretch 4 x 30\" NV 4 x 30\" 4 x 30\" 4 x30\" 4 x30\"        - SB soleus stretch 4 x 30\" NV 4 x 30\" 4 x 30\" 4 x30\" 4 x30\"                     Seated:             - plantar fascia stretch " "with towel 4 x 30\" ea NV 4 x 30\" 4 x 30\" ea  4 x30\"  ea 4 x30\"  ea                     Ther Activity                                       Gait Training                                       Modalities             Cryo as needed                               "

## 2023-06-08 ENCOUNTER — OFFICE VISIT (OUTPATIENT)
Dept: PHYSICAL THERAPY | Facility: CLINIC | Age: 78
End: 2023-06-08
Payer: COMMERCIAL

## 2023-06-08 DIAGNOSIS — M24.573 EQUINUS CONTRACTURE OF ANKLE: ICD-10-CM

## 2023-06-08 DIAGNOSIS — M76.72 PERONEAL TENDINITIS OF LEFT LOWER EXTREMITY: ICD-10-CM

## 2023-06-08 DIAGNOSIS — M72.2 PLANTAR FASCIITIS: Primary | ICD-10-CM

## 2023-06-08 DIAGNOSIS — M76.71 PERONEAL TENDINITIS OF RIGHT LOWER EXTREMITY: ICD-10-CM

## 2023-06-08 DIAGNOSIS — M19.90 OSTEOARTHRITIS OF MIDFOOT DUE TO INFLAMMATORY ARTHRITIS: ICD-10-CM

## 2023-06-08 DIAGNOSIS — M19.279 OSTEOARTHRITIS OF MIDFOOT DUE TO INFLAMMATORY ARTHRITIS: ICD-10-CM

## 2023-06-08 PROCEDURE — 97110 THERAPEUTIC EXERCISES: CPT

## 2023-06-08 PROCEDURE — 97140 MANUAL THERAPY 1/> REGIONS: CPT

## 2023-06-08 PROCEDURE — 97112 NEUROMUSCULAR REEDUCATION: CPT

## 2023-06-08 NOTE — PROGRESS NOTES
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  M19 279     M19 90                      Subjective: Pt reports noticed improvement B feet  Not getting the stabbing  pain in foot now and less tender  Objective: See treatment diary below      Assessment: Tolerated treatment well  Decreased tenderness B/L plantar fascia  Reviewed gentle calf stretch with patient  Patient demonstrated fatigue post treatment, exhibited good technique with therapeutic exercises and would benefit from continued PT      Plan: Continue per plan of care        Precautions: knee pain  Access Code: [email or web address] (23)    POC expires: 23  Next RE due: 23    Manuals  (IE)  6       IASTM to B plantar fascia NV JG JG MJC TM MJC       Massage roller to B gastrocs NV JG JG MJC TM MJC       TCJ mobilizations to B NV JG, grade IV JG, grade IV                       Neuro Re-Ed             Seated:             - doming 10 x 10\" NV 10 x 10\" 10 x 10\" 10 x10\" 10 x10\" 10 x10\"        - towel scrunches 2 mins NV 2 mins 2 mins 2 mins 2 mins 2 min                     Standing:             - SLS 15\" x 3 NV            - hip 3 way (limit UE support) 2 x 10 ea NV  1 x 10 ea 1x10 ea 1x10 ea x10 ea       Ther Ex             NuStep 8 mins, L4 NV 8 mins,L5 10 mins, L5 L5 x10 min L5 x10 min L5 x10 min                    Standing:             - SB gastroc stretch 4 x 30\" NV 4 x 30\" 4 x 30\" 4 x30\" 4 x30\" 4 x30\"       - SB soleus stretch 4 x 30\" NV 4 x 30\" 4 x 30\" 4 x30\" 4 x30\" 4 x30\"                    Seated:             - plantar fascia " "stretch with towel 4 x 30\" ea NV 4 x 30\" 4 x 30\" ea  4 x30\"  ea 4 x30\"  ea 4 x30\"  ea                    Ther Activity                                       Gait Training                                       Modalities             Cryo as needed                               "

## 2023-06-12 ENCOUNTER — OFFICE VISIT (OUTPATIENT)
Dept: PHYSICAL THERAPY | Facility: CLINIC | Age: 78
End: 2023-06-12
Payer: COMMERCIAL

## 2023-06-12 DIAGNOSIS — M19.90 OSTEOARTHRITIS OF MIDFOOT DUE TO INFLAMMATORY ARTHRITIS: ICD-10-CM

## 2023-06-12 DIAGNOSIS — M72.2 PLANTAR FASCIITIS: Primary | ICD-10-CM

## 2023-06-12 DIAGNOSIS — M76.72 PERONEAL TENDINITIS OF LEFT LOWER EXTREMITY: ICD-10-CM

## 2023-06-12 DIAGNOSIS — M76.71 PERONEAL TENDINITIS OF RIGHT LOWER EXTREMITY: ICD-10-CM

## 2023-06-12 DIAGNOSIS — M19.279 OSTEOARTHRITIS OF MIDFOOT DUE TO INFLAMMATORY ARTHRITIS: ICD-10-CM

## 2023-06-12 DIAGNOSIS — M24.573 EQUINUS CONTRACTURE OF ANKLE: ICD-10-CM

## 2023-06-12 PROCEDURE — 97140 MANUAL THERAPY 1/> REGIONS: CPT | Performed by: PHYSICAL THERAPIST

## 2023-06-12 PROCEDURE — 97112 NEUROMUSCULAR REEDUCATION: CPT | Performed by: PHYSICAL THERAPIST

## 2023-06-12 PROCEDURE — 97110 THERAPEUTIC EXERCISES: CPT | Performed by: PHYSICAL THERAPIST

## 2023-06-12 NOTE — PROGRESS NOTES
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  M19 279     M19 90                      Subjective: Patient reports his feet are feeling pretty good, but the rest of his body is feeling rough this morning  Objective: See treatment diary below      Assessment: Pt did not present with as many soft tissue restrictions this date as previous sessions  Progressed exercises this session to include SLS  Tolerated treatment well  Patient demonstrated fatigue post treatment, exhibited good technique with therapeutic exercises and would benefit from continued PT      Plan: Progress treatment as tolerated         Precautions: knee pain  Access Code: [email or web address] ([email or web address])    POC expires: 23  Next RE due: 23    Manuals  (IE)  6 6-12      IASTM to B plantar fascia NV JG JG MJC TM MJC JG      Massage roller to B gastrocs NV JG JG MJC TM MJC JG      TCJ mobilizations to B NV JG, grade IV JG, grade IV                       Neuro Re-Ed             Seated:             - doming 10 x 10\" NV 10 x 10\" 10 x 10\" 10 x10\" 10 x10\" 10 x10\"  10 x10\"      - towel scrunches 2 mins NV 2 mins 2 mins 2 mins 2 mins 2 min  2 mins                   Standing:             - SLS 15\" x 3 NV      15\" x 3      - hip 3 way (limit UE support) 2 x 10 ea NV  1 x 10 ea 1x10 ea 1x10 ea x10 ea 2 x 10 ea      Ther Ex             NuStep 8 mins, L4 NV 8 mins,L5 10 mins, L5 L5 x10 min L5 x10 min L5 x10 min L5 x10 min                   Standing:             - SB gastroc stretch 4 x 30\" NV 4 x 30\" 4 x 30\" 4 x30\" 4 x30\" 4 x30\" 4 x30\"      - " "SB soleus stretch 4 x 30\" NV 4 x 30\" 4 x 30\" 4 x30\" 4 x30\" 4 x30\" 4 x30\"                   Seated:             - plantar fascia stretch with towel 4 x 30\" ea NV 4 x 30\" 4 x 30\" ea  4 x30\"  ea 4 x30\"  ea 4 x30\"  ea 4 x 30\" ea                   Ther Activity                                       Gait Training                                       Modalities             Cryo as needed                               "

## 2023-06-15 ENCOUNTER — OFFICE VISIT (OUTPATIENT)
Dept: PHYSICAL THERAPY | Facility: CLINIC | Age: 78
End: 2023-06-15
Payer: COMMERCIAL

## 2023-06-15 DIAGNOSIS — M19.279 OSTEOARTHRITIS OF MIDFOOT DUE TO INFLAMMATORY ARTHRITIS: ICD-10-CM

## 2023-06-15 DIAGNOSIS — M76.72 PERONEAL TENDINITIS OF LEFT LOWER EXTREMITY: ICD-10-CM

## 2023-06-15 DIAGNOSIS — M72.2 PLANTAR FASCIITIS: ICD-10-CM

## 2023-06-15 DIAGNOSIS — M19.90 OSTEOARTHRITIS OF MIDFOOT DUE TO INFLAMMATORY ARTHRITIS: ICD-10-CM

## 2023-06-15 DIAGNOSIS — M76.71 PERONEAL TENDINITIS OF RIGHT LOWER EXTREMITY: Primary | ICD-10-CM

## 2023-06-15 DIAGNOSIS — M24.573 EQUINUS CONTRACTURE OF ANKLE: ICD-10-CM

## 2023-06-15 PROCEDURE — 97112 NEUROMUSCULAR REEDUCATION: CPT | Performed by: PHYSICAL THERAPIST

## 2023-06-15 PROCEDURE — 97140 MANUAL THERAPY 1/> REGIONS: CPT | Performed by: PHYSICAL THERAPIST

## 2023-06-15 NOTE — PROGRESS NOTES
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  M19 279     M19 90                      Subjective: Pt reports overall, he continues to feel very well  Pt feels he may be ready for discharge at his re-evaluation next week  Objective: See treatment diary below      Assessment: Less feedback noted through IASTM tools this date as compared to previous sessions  Tolerated treatment well  Patient demonstrated fatigue post treatment, exhibited good technique with therapeutic exercises and would benefit from continued PT  Plan: Progress treatment as tolerated         Precautions: knee pain  Access Code: [email or web address] (23)    POC expires: 23  Next RE due: 23    Manuals  (IE)  6 6-15     IASTM to B plantar fascia NV JG JG MJC TM MJC JG JG     Massage roller to B gastrocs NV JG JG MJC TM MJC JG JG     TCJ mobilizations to B NV JG, grade IV JG, grade IV                       Neuro Re-Ed             Seated:             - doming 10 x 10\" NV 10 x 10\" 10 x 10\" 10 x10\" 10 x10\" 10 x10\"  10 x10\"      - towel scrunches 2 mins NV 2 mins 2 mins 2 mins 2 mins 2 min  2 mins                   Standing:             - SLS 15\" x 3 NV      15\" x 3 15\" x 3     - hip 3 way (limit UE support) 2 x 10 ea NV  1 x 10 ea 1x10 ea 1x10 ea x10 ea 2 x 10 ea 2 x 10 ea     Ther Ex             NuStep 8 mins, L4 NV 8 mins,L5 10 mins, L5 L5 x10 min L5 x10 min L5 x10 min L5 x10 min L6 x10 min                  Standing:             - SB gastroc stretch 4 x 30\" NV 4 x 30\" 4 x 30\" 4 x30\" 4 x30\" 4 x30\" 4 x30\" 4 x30\"   " "  - SB soleus stretch 4 x 30\" NV 4 x 30\" 4 x 30\" 4 x30\" 4 x30\" 4 x30\" 4 x30\" 4 x30\"                  Seated:             - plantar fascia stretch with towel 4 x 30\" ea NV 4 x 30\" 4 x 30\" ea  4 x30\"  ea 4 x30\"  ea 4 x30\"  ea 4 x 30\" ea 4 x 30\" ea                  Ther Activity                                       Gait Training                                       Modalities             Cryo as needed                              * 30 minutes 1:1 time this date    "

## 2023-06-19 ENCOUNTER — OFFICE VISIT (OUTPATIENT)
Dept: PHYSICAL THERAPY | Facility: CLINIC | Age: 78
End: 2023-06-19
Payer: COMMERCIAL

## 2023-06-19 DIAGNOSIS — M19.279 OSTEOARTHRITIS OF MIDFOOT DUE TO INFLAMMATORY ARTHRITIS: ICD-10-CM

## 2023-06-19 DIAGNOSIS — M72.2 PLANTAR FASCIITIS: ICD-10-CM

## 2023-06-19 DIAGNOSIS — M24.573 EQUINUS CONTRACTURE OF ANKLE: ICD-10-CM

## 2023-06-19 DIAGNOSIS — M19.90 OSTEOARTHRITIS OF MIDFOOT DUE TO INFLAMMATORY ARTHRITIS: ICD-10-CM

## 2023-06-19 DIAGNOSIS — M76.71 PERONEAL TENDINITIS OF RIGHT LOWER EXTREMITY: Primary | ICD-10-CM

## 2023-06-19 DIAGNOSIS — M76.72 PERONEAL TENDINITIS OF LEFT LOWER EXTREMITY: ICD-10-CM

## 2023-06-19 PROCEDURE — 97112 NEUROMUSCULAR REEDUCATION: CPT | Performed by: PHYSICAL THERAPIST

## 2023-06-19 PROCEDURE — 97110 THERAPEUTIC EXERCISES: CPT | Performed by: PHYSICAL THERAPIST

## 2023-06-19 PROCEDURE — 97140 MANUAL THERAPY 1/> REGIONS: CPT | Performed by: PHYSICAL THERAPIST

## 2023-06-19 NOTE — PROGRESS NOTES
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  M19 279     M19 90                      Subjective: Patient reports no adverse reactions following previous session  Objective: See treatment diary below      Assessment: Patient reports no adverse reactions following previous session  Tolerated treatment well  Patient demonstrated fatigue post treatment, exhibited good technique with therapeutic exercises and would benefit from continued PT      Plan: Progress treatment as tolerated         Precautions: knee pain  Access Code: [email or web address] (23)    POC expires: 23  Next RE due: 23    Manuals  (IE) 5-26 5-30 6/1 6/6 6/8 6-12 6-15 6-19    IASTM [facility] B [facility]    Massage roller to B gastrocs NV [employer] MJ TM MJ JG JG JG    TCJ mobilizations to B NV JG, grade IV JG, grade IV                       Neuro Re-Ed             Seated:             - doming 10 x 10\" NV 10 x 10\" 10 x 10\" 10 x10\" 10 x10\" 10 x10\"  10 x10\"  10 x10\"    - towel scrunches 2 mins NV 2 mins 2 mins 2 mins 2 mins 2 min  2 mins  2 mins                 Standing:             - SLS 15\" x 3 NV      15\" x 3 15\" x 3 15\" x 3    - hip 3 way (limit UE support) 2 x 10 ea NV  1 x 10 ea 1x10 ea 1x10 ea x10 ea 2 x 10 ea 2 x 10 ea 2 x 10 ea    Ther Ex             NuStep 8 mins, L4 NV 8 mins,L5 10 mins, L5 L5 x10 min L5 x10 min L5 x10 min L5 x10 min L6 x10 min L5 x10 min                 Standing:             - SB gastroc stretch 4 x 30\" NV 4 x 30\" 4 x 30\" 4 x30\" 4 x30\" 4 x30\" 4 x30\" 4 x30\" 4 x30\"    - SB soleus stretch 4 x " "30\" NV 4 x 30\" 4 x 30\" 4 x30\" 4 x30\" 4 x30\" 4 x30\" 4 x30\" 4 x30\"                 Seated:             - plantar fascia stretch with towel 4 x 30\" ea NV 4 x 30\" 4 x 30\" ea  4 x30\"  ea 4 x30\"  ea 4 x30\"  ea 4 x 30\" ea 4 x 30\" ea 4 x 30\" ea                 Ther Activity                                       Gait Training                                       Modalities             Cryo as needed                               "

## 2023-06-22 ENCOUNTER — EVALUATION (OUTPATIENT)
Dept: PHYSICAL THERAPY | Facility: CLINIC | Age: 78
End: 2023-06-22
Payer: COMMERCIAL

## 2023-06-22 DIAGNOSIS — M19.90 OSTEOARTHRITIS OF MIDFOOT DUE TO INFLAMMATORY ARTHRITIS: ICD-10-CM

## 2023-06-22 DIAGNOSIS — M24.573 EQUINUS CONTRACTURE OF ANKLE: ICD-10-CM

## 2023-06-22 DIAGNOSIS — M72.2 PLANTAR FASCIITIS: ICD-10-CM

## 2023-06-22 DIAGNOSIS — M76.72 PERONEAL TENDINITIS OF LEFT LOWER EXTREMITY: ICD-10-CM

## 2023-06-22 DIAGNOSIS — M76.71 PERONEAL TENDINITIS OF RIGHT LOWER EXTREMITY: Primary | ICD-10-CM

## 2023-06-22 DIAGNOSIS — M19.279 OSTEOARTHRITIS OF MIDFOOT DUE TO INFLAMMATORY ARTHRITIS: ICD-10-CM

## 2023-06-22 PROCEDURE — 97110 THERAPEUTIC EXERCISES: CPT | Performed by: PHYSICAL THERAPIST

## 2023-06-22 PROCEDURE — 97112 NEUROMUSCULAR REEDUCATION: CPT | Performed by: PHYSICAL THERAPIST

## 2023-06-22 NOTE — PROGRESS NOTES
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  M19 279     M19 90           Start Time: 0708  Stop Time: 0800  Total time in clinic (min): 52 minutes    Assessment  Assessment details: Dolly Lowery is a pleasant 69 y o  presenting to physical therapy with MD referral for Equinus contracture of ankle, Peroneal tendinitis of left lower extremity,  Peroneal tendinitis of right lower extremity, 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, and Plantar fasciitis  Since time of initial evaluation, pt has made excellent improvement in ankle ROM, lower extremity strength, and functional balance  Pt has follow up with podiatrist next week and if in agreement, pt will be discharged to an independent HEP  Provided pt with updated HEP this date and ensured understanding  Problem list:  Limited ankle/foot ROM, decreased hip/core strength, limited lower extremity flexibility, abnormal gait    Treatment to include: Manual therapy techniques, lower extremity/core strengthening, neuromuscular control exercises, balance/proprioception training, gait training as needed, instruction in a comprehensive HEP, and modalities as needed  Symptom irritability: moderateBarriers to therapy: Chronicity of symptoms, knee pain, foot OA, BMI > 34  Understanding of Dx/Px/POC: good   Prognosis: good    Goals  ST  Pt will improve SLS to at least 10 seconds in 3  Weeks  MET  2  Pt will improve ankle DF to at least 10 degrees in 3 weeks  PARTIALLY MET    LT   Pt will be able to take first few steps in the morning with minimal to no pain in 6 weeks  MET  2  Pt will be independent in a comprehensive HEP in 6 weeks  MET    Plan  Plan details: Discharge to an independent HEP pending podiatrist agreement  Patient would benefit from: skilled physical therapy  Frequency: 2x week  Duration in weeks: 4  Plan of Care beginning date: 2023  Plan of Care expiration date: 2023  Treatment plan discussed with: patient        Subjective Evaluation    History of Present Illness  Mechanism of injury: Patient reports onset of pain in bilateral feet for the past 6 months to a year  He has been experiencing increased pain the past few months causing him to see a podiatrist  Pt is a  and experiences increased pain in bilateral feet with prolonged walking/standing  The podiatrist gave pt night splints for BLEs which he has been compliant in wearing  Right foot pain is located on dorsal aspect of foot as well as plantar fascia, Left foot pain is only in plantar fascia  Pt denies any numbness or tingling in bilateral feet  Premorbid status:  - ADLs: Independent with no difficulty  - Work: Part time, Full duty-   - Recreation: walking    Current status:  - ADLs/Functional activities:   - Stairs  Reciprocal pattern with Pain Levels: minimal pain (improved)   - Sit to stand with no pain   - First fews steps after prolonged sitting with no pain (improved)   - Walking unlimited without increase in foot pain (improved)   - Standing unlimited without increase in foot pain (improved)   - Sitting unlimited without pain   - Sleeping with 0 nightly sleep disturbances due to pain  - Work: Part time, Full duty  - Recreation: walking    Since time of initial evaluation, functionally, pt feels he is back to 60-70% of his premorbid status  Pt states he gets intermittent pain in the top of bilateral feet (R>L)     Pain  Current pain ratin  At best pain ratin  At worst pain ratin  Location: Dorsal aspect of right foot "  Quality: sharp, tight and discomfort  Aggravating factors: walking, standing and stair climbing  Progression: worsening      Diagnostic Tests  X-ray: abnormal  Treatments  Treatments tried: none  Current treatment: physical therapy  Patient Goals  Patient goals for therapy: decreased pain, increased motion and increased strength          Objective     Palpation     Additional Palpation Details  TTP over bilateral plantar fascia, calcaneal tubercles, and proximal gastroc on L    Active Range of Motion   Left Ankle/Foot   Dorsiflexion (ke): 8 degrees   Plantar flexion: 40 degrees   Inversion: 35 degrees   Eversion: 5 degrees     Right Ankle/Foot   Dorsiflexion (ke): 10 degrees   Plantar flexion: 33 degrees   Inversion: 30 degrees   Eversion: 5 degrees     Passive Range of Motion   Left Ankle/Foot    Dorsiflexion (ke): 14 degrees   Plantar flexion: 46 degrees   Inversion: 40 degrees   Great toe extension: 70 degrees     Right Ankle/Foot    Dorsiflexion (ke): 13 degrees   Plantar flexion: 40 degrees   Inversion: 40 degrees   Great toe extension: 55 degrees     Joint Play   Left Ankle/Foot  Hypomobile in the talocrural joint, subtalar joint and midfoot  Right Ankle/Foot  Hypomobile in the talocrural joint, subtalar joint and midfoot  Strength/Myotome Testing     Left Hip   Planes of Motion   Flexion: 4+  External rotation: 4+    Right Hip   Planes of Motion   Flexion: 5  External rotation: 5    Left Knee   Flexion: 5  Extension: 5    Right Knee   Flexion: 5  Extension: 5    Left Ankle/Foot   Dorsiflexion: 5  Plantar flexion: 5  Inversion: 5  Eversion: 5    Right Ankle/Foot   Dorsiflexion: 5  Plantar flexion: 5  Inversion: 5  Eversion: 5    Additional Strength Details  SLS L: 22\"  SLS R: 10\"    Gait: Pt ambulates over level surface with decreased stride lengths bilaterally lacking toe off bilaterally with no assistive device              Precautions: knee pain  Access Code: [email or web address] (5-23-23)     POC expires: " "7-7-23  Next RE due: 6-22-23     Manuals 5-23 (IE) 5-26 5-30 6/1 6/6 6/8 6-12 6-15 6-19 6-22 (RE)   IASTM to B plantar fascia NV JG JG MJC TM MJC JG JG JG  not needed   Massage roller to B gastrocs NV JG JG MJC TM MJC JG JG JG  not needed   TCJ mobilizations to B NV JG, grade IV JG, grade IV                                        Neuro Re-Ed                       Seated:                       - doming 10 x 10\" NV 10 x 10\" 10 x 10\" 10 x10\" 10 x10\" 10 x10\"  10 x10\"   10 x10\"  10 x 10\"   - towel scrunches 2 mins NV 2 mins 2 mins 2 mins 2 mins 2 min  2 mins   2 mins  2 mins                           Standing:                       - SLS 15\" x 3 NV           15\" x 3 15\" x 3 15\" x 3     - hip 3 way (limit UE support) 2 x 10 ea NV   1 x 10 ea 1x10 ea 1x10 ea x10 ea 2 x 10 ea 2 x 10 ea 2 x 10 ea     Ther Ex                       NuStep 8 mins, L4 NV 8 mins,L5 10 mins, L5 L5 x10 min L5 x10 min L5 x10 min L5 x10 min L6 x10 min L5 x10 min  10 mins, L5                           Standing:                       - SB gastroc stretch 4 x 30\" NV 4 x 30\" 4 x 30\" 4 x30\" 4 x30\" 4 x30\" 4 x30\" 4 x30\" 4 x30\"  4 x 30\"   - SB soleus stretch 4 x 30\" NV 4 x 30\" 4 x 30\" 4 x30\" 4 x30\" 4 x30\" 4 x30\" 4 x30\" 4 x30\"                             Seated:                       - plantar fascia stretch with towel 4 x 30\" ea NV 4 x 30\" 4 x 30\" ea  4 x30\"  ea 4 x30\"  ea 4 x30\"  ea 4 x 30\" ea 4 x 30\" ea 4 x 30\" ea                             Ther Activity                                                                       Gait Training                                                                       Modalities                       Cryo as needed                                                       "

## 2023-06-27 ENCOUNTER — APPOINTMENT (OUTPATIENT)
Dept: PHYSICAL THERAPY | Facility: CLINIC | Age: 78
End: 2023-06-27
Payer: COMMERCIAL

## 2023-06-29 ENCOUNTER — APPOINTMENT (OUTPATIENT)
Dept: PHYSICAL THERAPY | Facility: CLINIC | Age: 78
End: 2023-06-29
Payer: COMMERCIAL

## 2023-06-30 ENCOUNTER — OFFICE VISIT (OUTPATIENT)
Dept: PODIATRY | Facility: CLINIC | Age: 78
End: 2023-06-30
Payer: COMMERCIAL

## 2023-06-30 VITALS — HEIGHT: 69 IN | WEIGHT: 230 LBS | BODY MASS INDEX: 34.07 KG/M2

## 2023-06-30 DIAGNOSIS — M21.612 BILATERAL BUNIONS: ICD-10-CM

## 2023-06-30 DIAGNOSIS — M19.90 OSTEOARTHRITIS OF MIDFOOT DUE TO INFLAMMATORY ARTHRITIS: Primary | ICD-10-CM

## 2023-06-30 DIAGNOSIS — M21.611 BILATERAL BUNIONS: ICD-10-CM

## 2023-06-30 DIAGNOSIS — M72.2 PLANTAR FASCIITIS: ICD-10-CM

## 2023-06-30 DIAGNOSIS — M76.72 PERONEAL TENDINITIS OF LEFT LOWER EXTREMITY: ICD-10-CM

## 2023-06-30 DIAGNOSIS — M19.279 OSTEOARTHRITIS OF MIDFOOT DUE TO INFLAMMATORY ARTHRITIS: Primary | ICD-10-CM

## 2023-06-30 DIAGNOSIS — M77.32 HEEL SPUR, LEFT: ICD-10-CM

## 2023-06-30 DIAGNOSIS — M24.573 EQUINUS CONTRACTURE OF ANKLE: ICD-10-CM

## 2023-06-30 PROCEDURE — 99213 OFFICE O/P EST LOW 20 MIN: CPT | Performed by: PODIATRIST

## 2023-06-30 NOTE — PROGRESS NOTES
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    Bilateral bunions    Peroneal tendinitis of left lower extremity    Plantar fasciitis    Heel spur, left    Equinus contracture of ankle      - Physical Therapy notes reviewed and is doing very well with physical therapy  -He will be placed on a hold at home physical therapy plan  - Continue normal shoes continue stretching, continue conservative care  -Continue rigid shoes, continue carbon fiber footplate  - She is to return as needed may be placed on at home exercise regimen routine very compliant patient    Subjective:      Patient ID: Amanda Alvarez is a 69 y o  male  Patient presents for evaluation and management of his foot  Has been in physical therapy and is very happy with his overall relief in pain  Did buy new shoes and carbon fiber footplate  The following portions of the patient's history were reviewed and updated as appropriate: allergies, current medications, past family history, past medical history, past social history, past surgical history and problem list     Review of Systems   Constitutional: Negative for chills and fever  HENT: Negative for ear pain and sore throat  Eyes: Negative for pain and visual disturbance  Respiratory: Negative for cough and shortness of breath  Cardiovascular: Negative for chest pain and palpitations  Gastrointestinal: Negative for abdominal pain and vomiting  Genitourinary: Negative for dysuria and hematuria  Musculoskeletal: Negative for arthralgias and back pain  Skin: Negative for color change and rash  Neurological: Negative for seizures and syncope  All other systems reviewed and are negative  Objective:      Ht 5' 9\" (1 753 m)   Wt 104 kg (230 lb)   BMI 33 97 kg/m²          Physical Exam  Musculoskeletal:      Comments:  There " is mild continued warmth overlying the right dorsal midfoot, minimal pain but continued crepitus of the midfoot    Equinus has significantly improved overall drastic improvement

## 2023-07-25 DIAGNOSIS — A04.8 H. PYLORI INFECTION: ICD-10-CM

## 2023-07-25 RX ORDER — PANTOPRAZOLE SODIUM 20 MG/1
20 TABLET, DELAYED RELEASE ORAL DAILY
Qty: 90 TABLET | Refills: 1 | Status: SHIPPED | OUTPATIENT
Start: 2023-07-25

## 2023-08-02 ENCOUNTER — HOSPITAL ENCOUNTER (EMERGENCY)
Facility: HOSPITAL | Age: 78
Discharge: HOME | End: 2023-08-02
Attending: EMERGENCY MEDICINE | Admitting: EMERGENCY MEDICINE
Payer: COMMERCIAL

## 2023-08-02 VITALS
OXYGEN SATURATION: 95 % | RESPIRATION RATE: 20 BRPM | DIASTOLIC BLOOD PRESSURE: 64 MMHG | HEART RATE: 56 BPM | SYSTOLIC BLOOD PRESSURE: 113 MMHG | TEMPERATURE: 98.3 F

## 2023-08-02 DIAGNOSIS — T63.441A BEE STING: Primary | ICD-10-CM

## 2023-08-02 LAB
ALBUMIN SERPL BCP-MCNC: 3.6 G/DL (ref 3.5–5)
ALP SERPL-CCNC: 62 U/L (ref 34–104)
ALT SERPL W P-5'-P-CCNC: 16 U/L (ref 7–52)
ANION GAP SERPL CALCULATED.3IONS-SCNC: 10 MMOL/L
AST SERPL W P-5'-P-CCNC: 19 U/L (ref 13–39)
ATRIAL RATE: 58 BPM
BASOPHILS # BLD AUTO: 0 THOUSANDS/ÂΜL (ref 0–0.1)
BASOPHILS NFR BLD AUTO: 0 % (ref 0–1)
BILIRUB SERPL-MCNC: 0.66 MG/DL (ref 0.2–1)
BUN SERPL-MCNC: 23 MG/DL (ref 5–25)
CALCIUM SERPL-MCNC: 8.7 MG/DL (ref 8.4–10.2)
CHLORIDE SERPL-SCNC: 103 MMOL/L (ref 96–108)
CO2 SERPL-SCNC: 22 MMOL/L (ref 21–32)
CREAT SERPL-MCNC: 1.08 MG/DL (ref 0.6–1.3)
EOSINOPHIL # BLD AUTO: 0.08 THOUSAND/ÂΜL (ref 0–0.61)
EOSINOPHIL NFR BLD AUTO: 1 % (ref 0–6)
ERYTHROCYTE [DISTWIDTH] IN BLOOD BY AUTOMATED COUNT: 12.7 % (ref 11.6–15.1)
GFR SERPL CREATININE-BSD FRML MDRD: 65 ML/MIN/1.73SQ M
GLUCOSE SERPL-MCNC: 224 MG/DL (ref 65–140)
HCT VFR BLD AUTO: 51.7 % (ref 36.5–49.3)
HGB BLD-MCNC: 16.5 G/DL (ref 12–17)
IMM GRANULOCYTES # BLD AUTO: 0.03 THOUSAND/UL (ref 0–0.2)
IMM GRANULOCYTES NFR BLD AUTO: 1 % (ref 0–2)
LYMPHOCYTES # BLD AUTO: 2.04 THOUSANDS/ÂΜL (ref 0.6–4.47)
LYMPHOCYTES NFR BLD AUTO: 31 % (ref 14–44)
MCH RBC QN AUTO: 30.2 PG (ref 26.8–34.3)
MCHC RBC AUTO-ENTMCNC: 31.9 G/DL (ref 31.4–37.4)
MCV RBC AUTO: 95 FL (ref 82–98)
MONOCYTES # BLD AUTO: 0.12 THOUSAND/ÂΜL (ref 0.17–1.22)
MONOCYTES NFR BLD AUTO: 2 % (ref 4–12)
NEUTROPHILS # BLD AUTO: 4.26 THOUSANDS/ÂΜL (ref 1.85–7.62)
NEUTS SEG NFR BLD AUTO: 65 % (ref 43–75)
NRBC BLD AUTO-RTO: 0 /100 WBCS
P AXIS: 29 DEGREES
PLATELET # BLD AUTO: 285 THOUSANDS/UL (ref 149–390)
PMV BLD AUTO: 11 FL (ref 8.9–12.7)
POTASSIUM SERPL-SCNC: 4.5 MMOL/L (ref 3.5–5.3)
PR INTERVAL: 170 MS
PROT SERPL-MCNC: 6.9 G/DL (ref 6.4–8.4)
QRS AXIS: 93 DEGREES
QRSD INTERVAL: 102 MS
QT INTERVAL: 434 MS
QTC INTERVAL: 426 MS
RBC # BLD AUTO: 5.47 MILLION/UL (ref 3.88–5.62)
SODIUM SERPL-SCNC: 135 MMOL/L (ref 135–147)
T WAVE AXIS: 54 DEGREES
VENTRICULAR RATE: 58 BPM
WBC # BLD AUTO: 6.53 THOUSAND/UL (ref 4.31–10.16)

## 2023-08-02 PROCEDURE — 93005 ELECTROCARDIOGRAM TRACING: CPT

## 2023-08-02 PROCEDURE — 36415 COLL VENOUS BLD VENIPUNCTURE: CPT | Performed by: EMERGENCY MEDICINE

## 2023-08-02 PROCEDURE — 93010 ELECTROCARDIOGRAM REPORT: CPT | Performed by: INTERNAL MEDICINE

## 2023-08-02 PROCEDURE — 85025 COMPLETE CBC W/AUTO DIFF WBC: CPT | Performed by: EMERGENCY MEDICINE

## 2023-08-02 PROCEDURE — 80053 COMPREHEN METABOLIC PANEL: CPT | Performed by: EMERGENCY MEDICINE

## 2023-08-02 PROCEDURE — 99284 EMERGENCY DEPT VISIT MOD MDM: CPT | Performed by: EMERGENCY MEDICINE

## 2023-08-02 RX ORDER — ACETAMINOPHEN 325 MG/1
975 TABLET ORAL ONCE
Status: COMPLETED | OUTPATIENT
Start: 2023-08-02 | End: 2023-08-02

## 2023-08-02 RX ORDER — KETOROLAC TROMETHAMINE 30 MG/ML
15 INJECTION, SOLUTION INTRAMUSCULAR; INTRAVENOUS ONCE
Status: COMPLETED | OUTPATIENT
Start: 2023-08-02 | End: 2023-08-02

## 2023-08-02 RX ADMIN — SODIUM CHLORIDE 1000 ML: 0.9 INJECTION, SOLUTION INTRAVENOUS at 07:04

## 2023-08-02 RX ADMIN — ACETAMINOPHEN 975 MG: 325 TABLET ORAL at 07:13

## 2023-08-02 RX ADMIN — KETOROLAC TROMETHAMINE 15 MG: 30 INJECTION, SOLUTION INTRAMUSCULAR; INTRAVENOUS at 07:14

## 2023-08-02 NOTE — ED PROVIDER NOTES
History  Chief Complaint   Patient presents with   • Bee Sting     Multiple bee stings around 0600 this AM. Patient reports having a syncopal episode after incident. Does report taking 2 benadryl prior to arrival     Patient is a 64-year-old male with history of hyperlipidemia the presents for evaluation after bee sting. He said he was stung a total 4 times around 6 AM today. Subsequently, he felt unwell and felt lightheaded. He does not believe he actually lost consciousness but did go to the ground. He did not fall. He currently complains of some pain over the bites over his right eyebrow, right upper lip, right abdomen. No itching. No known allergy to bee stings. He took a Benadryl with improvement of his symptoms. He denies any chest pain dyspnea or abdominal pain throughout the event. He does still have some mild lightheadedness that is improving. Prior to Admission Medications   Prescriptions Last Dose Informant Patient Reported? Taking?    Coenzyme Q10 10 MG capsule   Yes No   Sig: Take 10 mg by mouth daily   Diclofenac Sodium (VOLTAREN) 1 %   No No   Sig: Apply 2 g topically 4 (four) times a day   Diclofenac Sodium (VOLTAREN) 1 %   No No   Sig: Apply 2 g topically 4 (four) times a day   Multiple Vitamin (multivitamin) tablet   Yes No   Sig: Take 1 tablet by mouth daily   Omega-3 1000 MG CAPS   Yes No   Sig: Take by mouth   Red Yeast Rice 600 MG CAPS   Yes No   Sig: TAKE  BY MOUTH EVERY DAY   Patient not taking: Reported on 2023   aspirin 81 MG tablet  Self Yes No   Sig: Take 81 mg by mouth once     fluticasone (FLONASE) 50 mcg/act nasal spray   No No   Si spray into each nostril daily   glimepiride (AMARYL) 4 mg tablet   No No   Sig: TAKE TWO TABLETS BY MOUTH EVERY DAY   glucose blood (OneTouch Ultra) test strip   No No   Sig: USE TO TEST BLOOD SUGAR DAILY   Patient not taking: Reported on 2023   linaGLIPtin 5 MG TABS   Yes No   Sig: Take 5 mg by mouth daily   Patient not taking: Reported on 2023   lisinopril (ZESTRIL) 40 mg tablet   No No   Sig: Take 1 tablet (40 mg total) by mouth daily   metFORMIN (GLUCOPHAGE-XR) 500 mg 24 hr tablet   No No   Sig: TAKE FOUR TABLETS BY MOUTH EVERY DAY WITH DINNER   pantoprazole (PROTONIX) 20 mg tablet   No No   Sig: Take 1 tablet (20 mg total) by mouth daily   pioglitazone (ACTOS) 30 mg tablet   No No   Sig: TAKE ONE TABLET BY MOUTH EVERY DAY   tamsulosin (FLOMAX) 0.4 mg   No No   Sig: Take 1 capsule (0.4 mg total) by mouth daily with dinner      Facility-Administered Medications: None       Past Medical History:   Diagnosis Date   • Arthritis of knee, right     LAST ASSESSED: 17   • History of gastroesophageal reflux (GERD)     LAST ASSESSED: 17   • Hypercholesterolemia     LAST ASSESSED: AND RESOLVED: 18   • Morbid (severe) obesity due to excess calories ([address])     LAST ASSESSED: 5/10/16   • Papular rash     RESOLVED: 16   • Pes anserine bursitis     LAST ASSESSED: 16   • Sebaceous cyst     LAST ASSESSED: 10/21/14   • Testicular hypogonadism     LAST ASSESSED: 10/30/13   • Tick bite     LAST ASSESSED: 16       Past Surgical History:   Procedure Laterality Date   • COLONOSCOPY     • ELBOW SURGERY Right    • UPPER GASTROINTESTINAL ENDOSCOPY     • WRIST FUSION Bilateral        Family History   Problem Relation Age of Onset   • Diabetes type II Mother         MELLITUS   • Rheumatic fever Mother    • Heart disease Mother    • No Known Problems Father      I have reviewed and agree with the history as documented.     E-Cigarette/Vaping   • E-Cigarette Use Never User      E-Cigarette/Vaping Substances   • Nicotine No    • THC No    • CBD No    • Flavoring No    • Other No    • Unknown No      Social History     Tobacco Use   • Smoking status: Former     Packs/day: 1.00     Years: 20.00     Total pack years: 20.00     Types: Cigarettes     Quit date: 3/10/1986     Years since quittin.4   • Smokeless tobacco: Never Vaping Use   • Vaping Use: Never used   Substance Use Topics   • Alcohol use: Not Currently     Comment: Sravanthi    • Drug use: No       Review of Systems   Constitutional: Negative for fever. HENT: Negative for sore throat. Eyes: Negative for photophobia. Respiratory: Negative for shortness of breath. Cardiovascular: Negative for chest pain. Gastrointestinal: Negative for abdominal pain. Genitourinary: Negative for dysuria. Musculoskeletal: Negative for back pain. Skin: Negative for rash. Neurological: Positive for light-headedness. Hematological: Negative for adenopathy. Psychiatric/Behavioral: Negative for agitation. All other systems reviewed and are negative. Physical Exam  Physical Exam  Vitals reviewed. Constitutional:       General: He is not in acute distress. Appearance: He is well-developed. HENT:      Head: Normocephalic. Comments: Bee stings to the right eyebrow and right upper lip noted     Mouth/Throat:      Comments: Oropharynx clear  Eyes:      Pupils: Pupils are equal, round, and reactive to light. Cardiovascular:      Rate and Rhythm: Normal rate and regular rhythm. Heart sounds: Normal heart sounds. No murmur heard. No friction rub. No gallop. Pulmonary:      Effort: Pulmonary effort is normal.      Breath sounds: Normal breath sounds. Abdominal:      General: Bowel sounds are normal. There is no distension. Palpations: Abdomen is soft. Tenderness: There is no abdominal tenderness. There is no guarding. Musculoskeletal:         General: Normal range of motion. Cervical back: Normal range of motion and neck supple. Skin:     Capillary Refill: Capillary refill takes less than 2 seconds. Neurological:      Mental Status: He is alert and oriented to person, place, and time. Cranial Nerves: No cranial nerve deficit. Sensory: No sensory deficit. Motor: No abnormal muscle tone.    Psychiatric: Behavior: Behavior normal.         Thought Content:  Thought content normal.         Judgment: Judgment normal.         Vital Signs  ED Triage Vitals [08/02/23 0655]   Temperature Pulse Respirations Blood Pressure SpO2   98.3 °F (36.8 °C) 77 16 118/85 93 %      Temp Source Heart Rate Source Patient Position - Orthostatic VS BP Location FiO2 (%)   Tympanic Monitor Sitting Left arm --      Pain Score       4           Vitals:    08/02/23 0655 08/02/23 0730   BP: 118/85 113/64   Pulse: 77 56   Patient Position - Orthostatic VS: Sitting          Visual Acuity      ED Medications  Medications   sodium chloride 0.9 % bolus 1,000 mL (0 mL Intravenous Stopped 8/2/23 0813)   ketorolac (TORADOL) injection 15 mg (15 mg Intravenous Given 8/2/23 0714)   acetaminophen (TYLENOL) tablet 975 mg (975 mg Oral Given 8/2/23 0713)       Diagnostic Studies  Results Reviewed     Procedure Component Value Units Date/Time    Comprehensive metabolic panel [[id number]]  (Abnormal) Collected: 08/02/23 0739    Lab Status: Final result Specimen: Blood from Hand, Right Updated: 08/02/23 0805     Sodium 135 mmol/L      Potassium 4.5 mmol/L      Chloride 103 mmol/L      CO2 22 mmol/L      ANION GAP 10 mmol/L      BUN 23 mg/dL      Creatinine 1.08 mg/dL      Glucose 224 mg/dL      Calcium 8.7 mg/dL      AST 19 U/L      ALT 16 U/L      Alkaline Phosphatase 62 U/L      Total Protein 6.9 g/dL      Albumin 3.6 g/dL      Total Bilirubin 0.66 mg/dL      eGFR 65 ml/min/1.73sq m     Narrative:      [place] guidelines for Chronic Kidney Disease (CKD):   •  Stage 1 with normal or high GFR (GFR > 90 mL/min/1.73 square meters)  •  Stage 2 Mild CKD (GFR = 60-89 mL/min/1.73 square meters)  •  Stage 3A Moderate CKD (GFR = 45-59 mL/min/1.73 square meters)  •  Stage 3B Moderate CKD (GFR = 30-44 mL/min/1.73 square meters)  •  Stage 4 Severe CKD (GFR = 15-29 mL/min/1.73 square meters)  •  Stage 5 End Stage CKD (GFR <15 mL/min/1.73 square meters)  Note: GFR calculation is accurate only with a steady state creatinine    CBC and differential [[id number]]  (Abnormal) Collected: 08/02/23 0705    Lab Status: Final result Specimen: Blood from Hand, Right Updated: 08/02/23 0709     WBC 6.53 Thousand/uL      RBC 5.47 Million/uL      Hemoglobin 16.5 g/dL      Hematocrit 51.7 %      MCV 95 fL      MCH 30.2 pg      MCHC 31.9 g/dL      RDW 12.7 %      MPV 11.0 fL      Platelets 212 Thousands/uL      nRBC 0 /100 WBCs      Neutrophils Relative 65 %      Immat GRANS % 1 %      Lymphocytes Relative 31 %      Monocytes Relative 2 %      Eosinophils Relative 1 %      Basophils Relative 0 %      Neutrophils Absolute 4.26 Thousands/µL      Immature Grans Absolute 0.03 Thousand/uL      Lymphocytes Absolute 2.04 Thousands/µL      Monocytes Absolute 0.12 Thousand/µL      Eosinophils Absolute 0.08 Thousand/µL      Basophils Absolute 0.00 Thousands/µL                  No orders to display              Procedures  Procedures         ED Course                               SBIRT 20yo+    Flowsheet Row Most Recent Value   Initial Alcohol Screen: US AUDIT-C     1. How often do you have a drink containing alcohol? 0 Filed at: 08/02/2023 0721   2. How many drinks containing alcohol do you have on a typical day you are drinking? 0 Filed at: 08/02/2023 0721   3a. Male UNDER 65: How often do you have five or more drinks on one occasion? 0 Filed at: 08/02/2023 0721   3b. FEMALE Any Age, or MALE 65+: How often do you have 4 or more drinks on one occassion? 0 Filed at: 08/02/2023 1820   Audit-C Score 0 Filed at: 08/02/2023 4281   MARSHALL: How many times in the past year have you. .. Used an illegal drug or used a prescription medication for non-medical reasons? Never Filed at: 08/02/2023 4802                    Medical Decision Making  Patient is a 60-year-old male who presents for evaluation of multiple bee stings. He became lightheaded and did not feel well afterwards.   No evidence of anaphylaxis however. He appears clinically well at this time and vital signs within normal limits. Blood work reviewed and unremarkable. EKG unremarkable. After 1 L IV fluids patient is feeling much better. Advised supportive measures and strict return precautions. Amount and/or Complexity of Data Reviewed  Labs: ordered. Risk  OTC drugs. Prescription drug management. Disposition  Final diagnoses:   Bee sting     Time reflects when diagnosis was documented in both MDM as applicable and the Disposition within this note     Time User Action Codes Description Comment    8/2/2023  8:08 AM [place] Anthony Mancilla [Q57.985K] Bee sting       ED Disposition     ED Disposition   Discharge    Condition   Stable    Date/Time   Wed Aug 2, 2023  8:08 AM    Comment   [facility] discharge to home/self care. Follow-up Information     Follow up With Specialties Details Why Contact Info Additional [address] [facility] Emergency Department Emergency Medicine  If symptoms worsen [address] [phone]  [address] [place] [address] Emergency Department, [address] [address] Rodriguez, [facility], [address]          Discharge Medication List as of 8/2/2023  8:08 AM      CONTINUE these medications which have NOT CHANGED    Details   aspirin 81 MG tablet Take 81 mg by mouth once  , Historical Med      Coenzyme Q10 10 MG capsule Take 10 mg by mouth daily, Starting Tue 2/15/2022, Historical Med      !! Diclofenac Sodium (VOLTAREN) 1 % Apply 2 g topically 4 (four) times a day, Starting Wed 5/17/2023, Normal      !!  Diclofenac Sodium (VOLTAREN) 1 % Apply 2 g topically 4 (four) times a day, Starting Wed 5/24/2023, Normal      fluticasone (FLONASE) 50 mcg/act nasal spray 1 spray into each nostril daily, Starting Fri 5/12/2023, Until Wed 11/8/2023, Normal      glimepiride (AMARYL) 4 mg tablet TAKE TWO TABLETS BY MOUTH EVERY DAY, Normal      glucose blood (OneTouch Ultra) test strip USE TO TEST BLOOD SUGAR DAILY, Normal      linaGLIPtin 5 MG TABS Take 5 mg by mouth daily, Historical Med      lisinopril (ZESTRIL) 40 mg tablet Take 1 tablet (40 mg total) by mouth daily, Starting Fri 5/12/2023, Normal      metFORMIN (GLUCOPHAGE-XR) 500 mg 24 hr tablet TAKE FOUR TABLETS BY MOUTH EVERY DAY WITH DINNER, Normal      Multiple Vitamin (multivitamin) tablet Take 1 tablet by mouth daily, Historical Med      Omega-3 1000 MG CAPS Take by mouth, Historical Med      pantoprazole (PROTONIX) 20 mg tablet Take 1 tablet (20 mg total) by mouth daily, Starting Tue 7/25/2023, Normal      pioglitazone (ACTOS) 30 mg tablet TAKE ONE TABLET BY MOUTH EVERY DAY, Normal      Red Yeast Rice 600 MG CAPS TAKE  BY MOUTH EVERY DAY, Historical Med      tamsulosin (FLOMAX) 0.4 mg Take 1 capsule (0.4 mg total) by mouth daily with dinner, Starting Mon 10/10/2022, Normal       !! - Potential duplicate medications found. Please discuss with provider. No discharge procedures on file.     PDMP Review     None          ED Provider  Electronically Signed by           Whitney Austin MD  08/02/23 0606

## 2023-08-08 DIAGNOSIS — E11.9 TYPE 2 DIABETES MELLITUS WITHOUT COMPLICATION, WITHOUT LONG-TERM CURRENT USE OF INSULIN (HCC): ICD-10-CM

## 2023-08-08 DIAGNOSIS — R35.0 BENIGN PROSTATIC HYPERPLASIA WITH URINARY FREQUENCY: ICD-10-CM

## 2023-08-08 DIAGNOSIS — E11.8 TYPE 2 DIABETES MELLITUS WITH COMPLICATION, WITHOUT LONG-TERM CURRENT USE OF INSULIN (HCC): ICD-10-CM

## 2023-08-08 DIAGNOSIS — N40.1 BENIGN PROSTATIC HYPERPLASIA WITH URINARY FREQUENCY: ICD-10-CM

## 2023-08-08 RX ORDER — PIOGLITAZONEHYDROCHLORIDE 30 MG/1
30 TABLET ORAL DAILY
Qty: 90 TABLET | Refills: 3 | Status: SHIPPED | OUTPATIENT
Start: 2023-08-08

## 2023-08-08 RX ORDER — TAMSULOSIN HYDROCHLORIDE 0.4 MG/1
0.4 CAPSULE ORAL
Qty: 90 CAPSULE | Refills: 3 | Status: SHIPPED | OUTPATIENT
Start: 2023-08-08

## 2023-08-08 RX ORDER — GLIMEPIRIDE 4 MG/1
8 TABLET ORAL DAILY
Qty: 180 TABLET | Refills: 3 | Status: SHIPPED | OUTPATIENT
Start: 2023-08-08

## 2023-09-12 ENCOUNTER — OFFICE VISIT (OUTPATIENT)
Dept: INTERNAL MEDICINE CLINIC | Facility: CLINIC | Age: 78
End: 2023-09-12
Payer: COMMERCIAL

## 2023-09-12 VITALS
OXYGEN SATURATION: 98 % | BODY MASS INDEX: 34.36 KG/M2 | DIASTOLIC BLOOD PRESSURE: 70 MMHG | WEIGHT: 232 LBS | SYSTOLIC BLOOD PRESSURE: 132 MMHG | HEART RATE: 62 BPM | TEMPERATURE: 98.2 F | HEIGHT: 69 IN

## 2023-09-12 DIAGNOSIS — I10 ESSENTIAL HYPERTENSION: Primary | ICD-10-CM

## 2023-09-12 DIAGNOSIS — Z23 ENCOUNTER FOR IMMUNIZATION: ICD-10-CM

## 2023-09-12 DIAGNOSIS — E78.00 HYPERCHOLESTEROLEMIA: ICD-10-CM

## 2023-09-12 DIAGNOSIS — E11.9 TYPE 2 DIABETES MELLITUS WITHOUT COMPLICATION, WITHOUT LONG-TERM CURRENT USE OF INSULIN (HCC): ICD-10-CM

## 2023-09-12 DIAGNOSIS — N40.1 BENIGN PROSTATIC HYPERPLASIA WITH LOWER URINARY TRACT SYMPTOMS, SYMPTOM DETAILS UNSPECIFIED: ICD-10-CM

## 2023-09-12 DIAGNOSIS — E11.8 TYPE 2 DIABETES MELLITUS WITH COMPLICATION, WITHOUT LONG-TERM CURRENT USE OF INSULIN (HCC): ICD-10-CM

## 2023-09-12 LAB — SL AMB POCT HEMOGLOBIN AIC: 6.3 (ref ?–6.5)

## 2023-09-12 PROCEDURE — 83036 HEMOGLOBIN GLYCOSYLATED A1C: CPT | Performed by: INTERNAL MEDICINE

## 2023-09-12 PROCEDURE — 99214 OFFICE O/P EST MOD 30 MIN: CPT | Performed by: INTERNAL MEDICINE

## 2023-09-12 RX ORDER — BLOOD SUGAR DIAGNOSTIC
STRIP MISCELLANEOUS
Qty: 200 EACH | Refills: 1 | Status: SHIPPED | OUTPATIENT
Start: 2023-09-12

## 2023-09-12 RX ORDER — GLIMEPIRIDE 4 MG/1
4 TABLET ORAL 2 TIMES DAILY
Qty: 180 TABLET | Refills: 3 | Status: SHIPPED | OUTPATIENT
Start: 2023-09-12 | End: 2024-03-10

## 2023-09-12 NOTE — PROGRESS NOTES
Assessment/Plan:    Problem List Items Addressed This Visit        Endocrine    Type 2 diabetes mellitus with complication, without long-term current use of insulin ([address])       Lab Results   Component Value Date    HGBA1C 6.3 09/12/2023   No hypoglycemia with current meds and tolerating the Actos 30 mg QDay  Glimepiride noted BID by the patient  No issues with the Metformin. Relevant Medications    glimepiride (AMARYL) 4 mg tablet    glucose blood (OneTouch Ultra) test strip    Other Relevant Orders    POCT hemoglobin A1c (Completed)       Cardiovascular and Mediastinum    Essential hypertension - Primary     BP noted to be good             Genitourinary    Enlarged prostate with lower urinary tract symptoms (LUTS)     Tolerating the Flomax without issues and no urinary symptoms. Last years PSA was good. 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        Other Visit Diagnoses     Encounter for immunization        Type 2 diabetes mellitus without complication, without long-term current use of insulin ([facility])        Relevant Medications    glimepiride (AMARYL) 4 mg tablet    glucose blood (OneTouch Ultra) test strip           Diagnoses and all orders for this visit:    Essential hypertension    Type 2 diabetes mellitus with complication, without long-term current use of insulin ([facility])  -     POCT hemoglobin A1c    Encounter for immunization    Type 2 diabetes mellitus without complication, without long-term current use of insulin ([facility])  -     glimepiride (AMARYL) 4 mg tablet;  Take 1 tablet (4 mg total) by mouth 2 (two) times a day  -     glucose blood (OneTouch Ultra) test strip; USE TO TEST BLOOD SUGAR DAILY    Hypercholesterolemia    Benign prostatic hyperplasia with lower urinary tract symptoms, symptom details unspecified        Type 2 diabetes mellitus with complication, without long-term current use of insulin ([address])    Lab Results Component Value Date    HGBA1C 6.3 09/12/2023   No hypoglycemia with current meds and tolerating the Actos 30 mg QDay  Glimepiride noted BID by the patient  No issues with the Metformin. Essential hypertension  BP noted to be good     Enlarged prostate with lower urinary tract symptoms (LUTS)  Tolerating the Flomax without issues and no urinary symptoms. Last years PSA was good. 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 male. The patient was seen and examined and noted to have issues with CTS symptoms in the left hand. Improved with hand brace while sleeping. The patient is doing well with his DM. He is not interested in the flu shot secondary to it being deferred to the [place]. The following portions of the patient's history were reviewed and updated as appropriate:   He has a past medical history of Arthritis of knee, right, History of gastroesophageal reflux (GERD), Hypercholesterolemia, Morbid (severe) obesity due to excess calories ([address]), Papular rash, Pes anserine bursitis, Sebaceous cyst, Testicular hypogonadism, and Tick bite.,  does not have any pertinent problems on file. ,   has a past surgical history that includes Wrist fusion (Bilateral); Elbow surgery (Right); Colonoscopy; and Upper gastrointestinal endoscopy. ,  family history includes Diabetes type II in his mother; Heart disease in his mother; No Known Problems in his father; Rheumatic fever in his mother. ,   reports that he quit smoking about 37 years ago. His smoking use included cigarettes. He has a 20.00 pack-year smoking history. He has never used smokeless tobacco. He reports that he does not currently use alcohol. He reports that he does not use drugs. ,  is allergic to pollen extract and tape [medical tape]. .  Current Outpatient Medications   Medication Sig Dispense Refill   • aspirin 81 MG tablet Take 81 mg by mouth once       • Coenzyme Q10 10 MG capsule Take 10 mg by mouth daily     • Diclofenac Sodium (VOLTAREN) 1 % Apply 2 g topically 4 (four) times a day 60 g 4   • fluticasone (FLONASE) 50 mcg/act nasal spray 1 spray into each nostril daily 48 g 1   • glimepiride (AMARYL) 4 mg tablet Take 1 tablet (4 mg total) by mouth 2 (two) times a day 180 tablet 3   • glucose blood (OneTouch Ultra) test strip USE TO TEST BLOOD SUGAR DAILY 200 each 1   • lisinopril (ZESTRIL) 40 mg tablet Take 1 tablet (40 mg total) by mouth daily 90 tablet 1   • metFORMIN (GLUCOPHAGE-XR) 500 mg 24 hr tablet TAKE FOUR TABLETS BY MOUTH EVERY DAY WITH DINNER 360 tablet 1   • Multiple Vitamin (multivitamin) tablet Take 1 tablet by mouth daily     • Omega-3 1000 MG CAPS Take by mouth     • pantoprazole (PROTONIX) 20 mg tablet Take 1 tablet (20 mg total) by mouth daily 90 tablet 1   • pioglitazone (ACTOS) 30 mg tablet Take 1 tablet (30 mg total) by mouth daily 90 tablet 3   • tamsulosin (FLOMAX) 0.4 mg Take 1 capsule (0.4 mg total) by mouth daily with dinner 90 capsule 3   • linaGLIPtin 5 MG TABS Take 5 mg by mouth daily (Patient not taking: Reported on 1/11/2023)       No current facility-administered medications for this visit. Review of Systems   Constitutional: Negative for chills, fatigue and fever. HENT: Negative. Respiratory: Negative for cough, chest tightness and shortness of breath. Cardiovascular: Negative for chest pain and palpitations. Gastrointestinal: Negative for abdominal pain, constipation, diarrhea, nausea and vomiting. Genitourinary: Negative. Musculoskeletal: Negative for back pain and myalgias. Skin: Negative. Neurological: Negative. Psychiatric/Behavioral: Negative for dysphoric mood. The patient is not nervous/anxious.           Objective:  Vitals:    09/12/23 0650   BP: 132/70   Pulse: 62   Temp: 98.2 °F (36.8 °C)   TempSrc: Tympanic   SpO2: 98%   Weight: 105 kg (232 lb)   Height: 5' 9" (1.753 m)     Body mass index is 34.26 kg/m². Physical Exam  Vitals and nursing note reviewed. Constitutional:       Appearance: He is well-developed. HENT:      Head: Normocephalic and atraumatic. Eyes:      Pupils: Pupils are equal, round, and reactive to light. Cardiovascular:      Rate and Rhythm: Normal rate and regular rhythm. Heart sounds: Normal heart sounds. No murmur heard. Pulmonary:      Effort: Pulmonary effort is normal.      Breath sounds: Normal breath sounds. No stridor. No rales. Abdominal:      General: Bowel sounds are normal. There is no distension. Palpations: Abdomen is soft. Tenderness: There is no abdominal tenderness. Musculoskeletal:         General: No deformity. Normal range of motion. Cervical back: Normal range of motion and neck supple. Skin:     General: Skin is warm and dry. Neurological:      Mental Status: He is alert and oriented to person, place, and time.           PHQ-2/9 Depression Screening    Little interest or pleasure in doing things: 0 - not at all  Feeling down, depressed, or hopeless: 0 - not at all  PHQ-2 Score: 0  PHQ-2 Interpretation: Negative depression screen

## 2023-09-12 NOTE — ASSESSMENT & PLAN NOTE
Lab Results   Component Value Date    HGBA1C 6.3 09/12/2023   No hypoglycemia with current meds and tolerating the Actos 30 mg QDay  Glimepiride noted BID by the patient  No issues with the Metformin.

## 2023-09-19 ENCOUNTER — OFFICE VISIT (OUTPATIENT)
Dept: OBGYN CLINIC | Facility: CLINIC | Age: 78
End: 2023-09-19
Payer: COMMERCIAL

## 2023-09-19 ENCOUNTER — PREP FOR PROCEDURE (OUTPATIENT)
Dept: OBGYN CLINIC | Facility: CLINIC | Age: 78
End: 2023-09-19

## 2023-09-19 VITALS
BODY MASS INDEX: 36.58 KG/M2 | WEIGHT: 247 LBS | HEIGHT: 69 IN | DIASTOLIC BLOOD PRESSURE: 73 MMHG | SYSTOLIC BLOOD PRESSURE: 151 MMHG | HEART RATE: 58 BPM

## 2023-09-19 DIAGNOSIS — G56.02 CARPAL TUNNEL SYNDROME OF LEFT WRIST: Primary | ICD-10-CM

## 2023-09-19 PROCEDURE — 99213 OFFICE O/P EST LOW 20 MIN: CPT | Performed by: ORTHOPAEDIC SURGERY

## 2023-09-19 RX ORDER — CHLORHEXIDINE GLUCONATE ORAL RINSE 1.2 MG/ML
15 SOLUTION DENTAL ONCE
OUTPATIENT
Start: 2023-09-19 | End: 2023-09-19

## 2023-09-19 RX ORDER — CHLORHEXIDINE GLUCONATE 4 G/100ML
SOLUTION TOPICAL DAILY PRN
OUTPATIENT
Start: 2023-09-19

## 2023-09-19 RX ORDER — CEFAZOLIN SODIUM 2 G/50ML
2000 SOLUTION INTRAVENOUS ONCE
OUTPATIENT
Start: 2023-09-19 | End: 2023-09-19

## 2023-09-19 NOTE — PROGRESS NOTES
ASSESSMENT/PLAN:    Diagnoses and all orders for this visit:    Carpal tunnel syndrome of left wrist  -     Case request operating room: RELEASE CARPAL TUNNEL; Standing  -     Ambulatory Referral to Physical Therapy; Future  -     Ambulatory Referral to Occupational Therapy; Future  -     Case request operating room: RELEASE CARPAL TUNNEL    Other orders  -     Nursing Communication [address] [facility] Interventions Implemented; Standing  -     Nursing Communication CHG bath, have staff wash entire body (neck down) per pre-op bathing protocol. Routine, evening prior to, and day of surgery.; Standing  -     Nursing Communication Swab both nares with Povidone-Iodine solution, EXCLUDE if patient has shellfish/Iodine allergy, and replace with nasal alcohol swabstick. Routine, day of surgery, on call to OR; Standing  -     chlorhexidine (PERIDEX) 0.12 % oral rinse 15 mL  -     Void on call to OR; Standing  -     Insert peripheral IV; Standing  -     chlorhexidine (HIBICLENS) 4 % topical liquid  -     ceFAZolin (ANCEF) IVPB (premix in dextrose) 2,000 mg 50 mL        EMGs of the patient's left wrist are consistent with carpal tunnel syndrome. The patient stated that his symptoms were continuing to worsen and starting to affect his quality of life. After exhausting conservative treatment, the risks and benefits of surgery were discussed with the patient. He decided on an elective left carpal tunnel release. The patient surgery is tentatively scheduled for November 6, 2023. He is acceptable to this plan. Return for surgery. The patient has symptomatic carpal tunnel syndrome in his left wrist.  Is opted for elective left carpal tunnel release.   All risks, complications, benefits were discussed with the patient great detail including bleeding, infection, blood clots, pain, stiffness, neurovascular damage, fractures, dislocations, the possibility loss of life surgery, heart attack, stroke, wound in the problems, worsening nerve recovery, etc.  His surgery scheduled for 2023      _____________________________________________________  CHIEF COMPLAINT:  Chief Complaint   Patient presents with   • Left Wrist - Follow-up     EMG report         SUBJECTIVE:  Tasneem Tan is a 69 y.o. male who presents to our office to review EMG reports of his left wrist.  He states he is still having left hand numbness and tingling with pain. He denies any fever or chills. His pain is worse at night, especially when he lays on his left side.     The following portions of the patient's history were reviewed and updated as appropriate: allergies, current medications, past family history, past medical history, past social history, past surgical history and problem list.    PAST MEDICAL HISTORY:  Past Medical History:   Diagnosis Date   • Arthritis of knee, right     LAST ASSESSED: 17   • History of gastroesophageal reflux (GERD)     LAST ASSESSED: 17   • Hypercholesterolemia     LAST ASSESSED: AND RESOLVED: 18   • Morbid (severe) obesity due to excess calories ([address])     LAST ASSESSED: 5/10/16   • Papular rash     RESOLVED: 16   • Pes anserine bursitis     LAST ASSESSED: 16   • Sebaceous cyst     LAST ASSESSED: 10/21/14   • Testicular hypogonadism     LAST ASSESSED: 10/30/13   • Tick bite     LAST ASSESSED: 16       PAST SURGICAL HISTORY:  Past Surgical History:   Procedure Laterality Date   • COLONOSCOPY     • ELBOW SURGERY Right    • UPPER GASTROINTESTINAL ENDOSCOPY     • WRIST FUSION Bilateral        FAMILY HISTORY:  Family History   Problem Relation Age of Onset   • Diabetes type II Mother         MELLITUS   • Rheumatic fever Mother    • Heart disease Mother    • No Known Problems Father        SOCIAL HISTORY:  Social History     Tobacco Use   • Smoking status: Former     Packs/day: 1.00     Years: 20.00     Total pack years: 20.00     Types: Cigarettes     Quit date: 3/10/1986     Years since quittin.5   • Smokeless tobacco: Never   Vaping Use   • Vaping Use: Never used   Substance Use Topics   • Alcohol use: Not Currently     Comment: Sravanthi    • Drug use: No       MEDICATIONS:    Current Outpatient Medications:   •  aspirin 81 MG tablet, Take 81 mg by mouth once  , Disp: , Rfl:   •  Coenzyme Q10 10 MG capsule, Take 10 mg by mouth daily, Disp: , Rfl:   •  Diclofenac Sodium (VOLTAREN) 1 %, Apply 2 g topically 4 (four) times a day, Disp: 60 g, Rfl: 4  •  fluticasone (FLONASE) 50 mcg/act nasal spray, 1 spray into each nostril daily, Disp: 48 g, Rfl: 1  •  glimepiride (AMARYL) 4 mg tablet, Take 1 tablet (4 mg total) by mouth 2 (two) times a day, Disp: 180 tablet, Rfl: 3  •  glucose blood (OneTouch Ultra) test strip, USE TO TEST BLOOD SUGAR DAILY, Disp: 200 each, Rfl: 1  •  lisinopril (ZESTRIL) 40 mg tablet, Take 1 tablet (40 mg total) by mouth daily, Disp: 90 tablet, Rfl: 1  •  metFORMIN (GLUCOPHAGE-XR) 500 mg 24 hr tablet, TAKE FOUR TABLETS BY MOUTH EVERY DAY WITH DINNER, Disp: 360 tablet, Rfl: 1  •  Multiple Vitamin (multivitamin) tablet, Take 1 tablet by mouth daily, Disp: , Rfl:   •  Omega-3 1000 MG CAPS, Take by mouth, Disp: , Rfl:   •  pantoprazole (PROTONIX) 20 mg tablet, Take 1 tablet (20 mg total) by mouth daily, Disp: 90 tablet, Rfl: 1  •  pioglitazone (ACTOS) 30 mg tablet, Take 1 tablet (30 mg total) by mouth daily, Disp: 90 tablet, Rfl: 3  •  tamsulosin (FLOMAX) 0.4 mg, Take 1 capsule (0.4 mg total) by mouth daily with dinner, Disp: 90 capsule, Rfl: 3  •  linaGLIPtin 5 MG TABS, Take 5 mg by mouth daily (Patient not taking: Reported on 1/11/2023), Disp: , Rfl:     ALLERGIES:  Allergies   Allergen Reactions   • Pollen Extract Allergic Rhinitis   • Tape [Medical Tape] Rash     BANDAID ADHESIVE       ROS:  Review of Systems     Constitutional: Negative for fatigue, fever or loss of appetite. HENT: Negative. Respiratory: Negative for shortness of breath, dyspnea.     Cardiovascular: Negative for chest pain/tightness. Gastrointestinal: Negative for abdominal pain, N/V. Endocrine: Negative for cold/heat intolerance, unexplained weight loss/gain. Genitourinary: Negative for flank pain, dysuria, hematuria. Musculoskeletal: Positive for arthralgia   Skin: Negative for rash. Neurological: Positive for numbness or tingling  Psychiatric/Behavioral: Negative for agitation. _____________________________________________________  PHYSICAL EXAMINATION:    Blood pressure 151/73, pulse 58, height 5' 9" (1.753 m), weight 112 kg (247 lb). Constitutional: Oriented to person, place, and time. Appears well-developed and well-nourished. No distress. HENT:   Head: Normocephalic. Eyes: Conjunctivae are normal. Right eye exhibits no discharge. Left eye exhibits no discharge. No scleral icterus. Cardiovascular: Normal rate. Pulmonary/Chest: Effort normal.   Neurological: Alert and oriented to person, place, and time. Skin: Skin is warm and dry. No rash noted. Not diaphoretic. No erythema. No pallor. Psychiatric: Normal mood and affect. Behavior is normal. Judgment and thought content normal.      MUSCULOSKELETAL EXAMINATION:   Physical Exam  Ortho Exam    Left upper extremity is neurovascularly intact  Fingers are pink and mobile  Compartments are soft  Positive Tinel's  Positive Phalen's  Two-point discrimination is markedly elongated along median nerve distribution  Brisk cap refill  Objective:  BP Readings from Last 1 Encounters:   09/19/23 151/73      Wt Readings from Last 1 Encounters:   09/19/23 112 kg (247 lb)        BMI:   Estimated body mass index is 36.48 kg/m² as calculated from the following:    Height as of this encounter: 5' 9" (1.753 m). Weight as of this encounter: 112 kg (247 lb).         Scribe Attestation    I,:  Riya Flores PA-C am acting as a scribe while in the presence of the attending physician.:       I,:  Sonia Dial DO personally performed the services described in this documentation    as scribed in my presence.:

## 2023-10-25 DIAGNOSIS — J30.1 SEASONAL ALLERGIC RHINITIS DUE TO POLLEN: ICD-10-CM

## 2023-10-25 DIAGNOSIS — I10 ESSENTIAL HYPERTENSION: ICD-10-CM

## 2023-10-25 RX ORDER — LISINOPRIL 40 MG/1
40 TABLET ORAL DAILY
Qty: 90 TABLET | Refills: 1 | Status: SHIPPED | OUTPATIENT
Start: 2023-10-25

## 2023-10-25 RX ORDER — FLUTICASONE PROPIONATE 50 MCG
1 SPRAY, SUSPENSION (ML) NASAL DAILY
Qty: 48 G | Refills: 1 | Status: SHIPPED | OUTPATIENT
Start: 2023-10-25 | End: 2024-04-22

## 2023-10-25 NOTE — TELEPHONE ENCOUNTER
Patient needs refill on lisinopril (ZESTRIL) 40 mg tablet  and fluticasone (FLONASE) 50 mcg/act nasal spray  please send to [facility]

## 2023-10-27 NOTE — PRE-PROCEDURE INSTRUCTIONS
Pre-Surgery Instructions:   Medication Instructions    aspirin 81 MG tablet Stop taking 7 days prior to surgery. Coenzyme Q10 10 MG capsule Stop taking 7 days prior to surgery. Diclofenac Sodium (VOLTAREN) 1 % Stop taking 3 days prior to surgery. fluticasone (FLONASE) 50 mcg/act nasal spray Uses PRN- OK to take day of surgery    glimepiride (AMARYL) 4 mg tablet Hold day of surgery. lisinopril (ZESTRIL) 40 mg tablet Hold day of surgery. metFORMIN (GLUCOPHAGE-XR) 500 mg 24 hr tablet Hold day of surgery. Multiple Vitamin (multivitamin) tablet Stop taking 7 days prior to surgery. Omega-3 1000 MG CAPS Stop taking 7 days prior to surgery. pantoprazole (PROTONIX) 20 mg tablet Take day of surgery. pioglitazone (ACTOS) 30 mg tablet Take night before surgery    tamsulosin (FLOMAX) 0.4 mg Take night before surgery   Medication instructions for day surgery reviewed. Please use only a sip of water to take your instructed medications. Avoid all over the counter vitamins, supplements and NSAIDS for one week prior to surgery per anesthesia guidelines. Tylenol is ok to take as needed. You will receive a call one business day prior to surgery with an arrival time and hospital directions. If your surgery is scheduled on a Monday, the hospital will be calling you on the Friday prior to your surgery. If you have not heard from anyone by 8pm, please call the hospital supervisor through the hospital  at [phone]. Angel Fuentes [phone]). Do not eat or drink anything after midnight the night before your surgery, including candy, mints, lifesavers, or chewing gum. Do not drink alcohol 24hrs before your surgery. Try not to smoke at least 24hrs before your surgery. Follow the pre surgery showering instructions as listed in the [facility] Surgical Experience Booklet” or otherwise provided by your surgeon's office. Do not use a blade to shave the surgical area 1 week before surgery.  It is okay to use a clean electric clippers up to 24 hours before surgery. Do not apply any lotions, creams, including makeup, cologne, deodorant, or perfumes after showering on the day of your surgery. Do not use dry shampoo, hair spray, hair gel, or any type of hair products. No contact lenses, eye make-up, or artificial eyelashes. Remove nail polish, including gel polish, and any artificial, gel, or acrylic nails if possible. Remove all jewelry including rings and body piercing jewelry. Wear causal clothing that is easy to take on and off. Consider your type of surgery. Keep any valuables, jewelry, piercings at home. Please bring any specially ordered equipment (sling, braces) if indicated. Arrange for a responsible person to drive you to and from the hospital on the day of your surgery. Visitor Guidelines discussed. Call the surgeon's office with any new illnesses, exposures, or additional questions prior to surgery. Please reference your [facility] Surgical Experience Booklet” for additional information to prepare for your upcoming surgery.

## 2023-11-03 PROCEDURE — NC001 PR NO CHARGE: Performed by: ORTHOPAEDIC SURGERY

## 2023-11-03 NOTE — H&P
H&P Exam - Orthopedics   Quinten Winchester 69 y.o. male MRN: [id number]  Unit/Bed#:  Encounter: [id number]    Assessment/Plan     Assessment:  Carpal tunnel syndrome of left wrist  Plan:  Elective left carpal tunnel release    History of Present Illness   HPI:  Quinten Winchester is a 69 y.o. male who presented to our office complaining of left wrist/hand pain with numbness and tingling. The patient stated that his symptoms were continuing to worsen and starting to affect his quality of life. An EMG was performed which consistent with carpal tunnel syndrome of his left wrist.  After exhausting conservative treatment, the risks and benefits of surgery were discussed with the patient. He decided on an elective left carpal tunnel release. Review of Systems   Constitutional:  Negative for chills, fever and unexpected weight change. HENT:  Negative for nosebleeds and sore throat. Eyes:  Negative for pain, redness and visual disturbance. Respiratory:  Negative for cough, shortness of breath and wheezing. Cardiovascular:  Negative for chest pain, palpitations and leg swelling. Gastrointestinal:  Negative for abdominal pain, nausea and vomiting. Endocrine: Negative for polydipsia and polyuria. Genitourinary:  Negative for dysuria and hematuria. Musculoskeletal:  Positive for arthralgias and myalgias. As noted in HPI   Skin:  Negative for rash and wound. Neurological:  Positive for numbness. Negative for dizziness and headaches. Psychiatric/Behavioral:  Negative for decreased concentration and suicidal ideas. The patient is not nervous/anxious.         Historical Information   Past Medical History:   Diagnosis Date    Arthritis of knee, right     LAST ASSESSED: 2/7/17    Diabetes mellitus ([address])     Does use hearing aid     jesus    Enlarged prostate     GERD (gastroesophageal reflux disease)     Headache     occ    History of gastroesophageal reflux (GERD)     LAST ASSESSED: 5/9/17 Hypercholesterolemia     LAST ASSESSED: AND RESOLVED: 18    Morbid (severe) obesity due to excess calories ([address])     LAST ASSESSED: 5/10/16    Pes anserine bursitis     LAST ASSESSED: 16    Testicular hypogonadism     LAST ASSESSED: 10/30/13    Tick bite     10/27/23 on abdomen    Wears dentures     full uppers     Past Surgical History:   Procedure Laterality Date    COLONOSCOPY      ELBOW SURGERY Right     UPPER GASTROINTESTINAL ENDOSCOPY      WRIST FUSION Right      Social History   Social History     Substance and Sexual Activity   Alcohol Use Not Currently     Social History     Substance and Sexual Activity   Drug Use No     Social History     Tobacco Use   Smoking Status Former    Packs/day: 1.00    Years: 20.00    Total pack years: 20.00    Types: Cigarettes    Quit date: 3/10/1986    Years since quittin.6   Smokeless Tobacco Never     Family History: non-contributory    Meds/Allergies   all medications and allergies reviewed  Allergies   Allergen Reactions    Pollen Extract Allergic Rhinitis    Tape [Medical Tape] Rash     BANDAID ADHESIVE       Objective   Vitals: Height 5' 9" (1.753 m), weight 109 kg (240 lb). ,Body mass index is 35.44 kg/m². No intake or output data in the 24 hours ending 23 1140    No intake/output data recorded. Invasive Devices       None                   Physical Exam  Ortho Exam  Left upper extremity is neurovascularly intact  Fingers are pink and mobile  Compartments are soft  Positive Tinel's  Positive Phalen's  Two-point discrimination is markedly elongated along median nerve distribution  Brisk cap refill  No warmth or erythema  No ecchymosis  Cardiovascular: Normal rate    Pulmonary: Effort normal  Abdomen: Soft, nontender, nondistended   Lab Results: I have personally reviewed pertinent lab results.   Imaging: I have personally reviewed pertinent films in PACS  EKG, Pathology, and Other Studies: I have personally reviewed pertinent films in PACS    Code Status: No Order  Advance Directive and Living Will: Yes    Power of :    POLST:      Counseling / Coordination of Care  Total floor / unit time spent today 30 minutes. Greater than 50% of total time was spent with the patient and / or family counseling and / or coordination of care.

## 2023-11-04 ENCOUNTER — ANESTHESIA EVENT (OUTPATIENT)
Dept: PERIOP | Facility: HOSPITAL | Age: 78
End: 2023-11-04
Payer: COMMERCIAL

## 2023-11-06 ENCOUNTER — ANESTHESIA (OUTPATIENT)
Dept: PERIOP | Facility: HOSPITAL | Age: 78
End: 2023-11-06
Payer: COMMERCIAL

## 2023-11-06 ENCOUNTER — HOSPITAL ENCOUNTER (OUTPATIENT)
Facility: HOSPITAL | Age: 78
Setting detail: OUTPATIENT SURGERY
Discharge: HOME | End: 2023-11-06
Attending: ORTHOPAEDIC SURGERY | Admitting: ORTHOPAEDIC SURGERY
Payer: COMMERCIAL

## 2023-11-06 VITALS
OXYGEN SATURATION: 96 % | TEMPERATURE: 97.2 F | BODY MASS INDEX: 35.55 KG/M2 | DIASTOLIC BLOOD PRESSURE: 68 MMHG | SYSTOLIC BLOOD PRESSURE: 143 MMHG | RESPIRATION RATE: 22 BRPM | HEIGHT: 69 IN | WEIGHT: 240 LBS | HEART RATE: 61 BPM

## 2023-11-06 DIAGNOSIS — G56.02 CARPAL TUNNEL SYNDROME OF LEFT WRIST: ICD-10-CM

## 2023-11-06 LAB
GLUCOSE SERPL-MCNC: 130 MG/DL (ref 65–140)
GLUCOSE SERPL-MCNC: 135 MG/DL (ref 65–140)

## 2023-11-06 PROCEDURE — 82948 REAGENT STRIP/BLOOD GLUCOSE: CPT

## 2023-11-06 PROCEDURE — 64721 CARPAL TUNNEL SURGERY: CPT | Performed by: ORTHOPAEDIC SURGERY

## 2023-11-06 PROCEDURE — 88304 TISSUE EXAM BY PATHOLOGIST: CPT | Performed by: PATHOLOGY

## 2023-11-06 PROCEDURE — 64721 CARPAL TUNNEL SURGERY: CPT | Performed by: PHYSICIAN ASSISTANT

## 2023-11-06 RX ORDER — ONDANSETRON 2 MG/ML
INJECTION INTRAMUSCULAR; INTRAVENOUS AS NEEDED
Status: DISCONTINUED | OUTPATIENT
Start: 2023-11-06 | End: 2023-11-06

## 2023-11-06 RX ORDER — ONDANSETRON 2 MG/ML
4 INJECTION INTRAMUSCULAR; INTRAVENOUS EVERY 6 HOURS PRN
Status: DISCONTINUED | OUTPATIENT
Start: 2023-11-06 | End: 2023-11-06 | Stop reason: HOSPADM

## 2023-11-06 RX ORDER — TRAMADOL HYDROCHLORIDE 50 MG/1
50 TABLET ORAL EVERY 6 HOURS PRN
Qty: 21 TABLET | Refills: 0 | Status: SHIPPED | OUTPATIENT
Start: 2023-11-06 | End: 2023-11-16

## 2023-11-06 RX ORDER — CHLORHEXIDINE GLUCONATE ORAL RINSE 1.2 MG/ML
15 SOLUTION DENTAL ONCE
Status: COMPLETED | OUTPATIENT
Start: 2023-11-06 | End: 2023-11-06

## 2023-11-06 RX ORDER — FENTANYL CITRATE/PF 50 MCG/ML
25 SYRINGE (ML) INJECTION
Status: DISCONTINUED | OUTPATIENT
Start: 2023-11-06 | End: 2023-11-06 | Stop reason: HOSPADM

## 2023-11-06 RX ORDER — ONDANSETRON 2 MG/ML
4 INJECTION INTRAMUSCULAR; INTRAVENOUS ONCE AS NEEDED
Status: DISCONTINUED | OUTPATIENT
Start: 2023-11-06 | End: 2023-11-06 | Stop reason: HOSPADM

## 2023-11-06 RX ORDER — CHLORHEXIDINE GLUCONATE 4 G/100ML
SOLUTION TOPICAL DAILY PRN
Status: DISCONTINUED | OUTPATIENT
Start: 2023-11-06 | End: 2023-11-06 | Stop reason: HOSPADM

## 2023-11-06 RX ORDER — SODIUM CHLORIDE, SODIUM LACTATE, POTASSIUM CHLORIDE, CALCIUM CHLORIDE 600; 310; 30; 20 MG/100ML; MG/100ML; MG/100ML; MG/100ML
125 INJECTION, SOLUTION INTRAVENOUS CONTINUOUS
Status: DISCONTINUED | OUTPATIENT
Start: 2023-11-06 | End: 2023-11-06

## 2023-11-06 RX ORDER — EPHEDRINE SULFATE 50 MG/ML
INJECTION INTRAVENOUS AS NEEDED
Status: DISCONTINUED | OUTPATIENT
Start: 2023-11-06 | End: 2023-11-06

## 2023-11-06 RX ORDER — FENTANYL CITRATE 50 UG/ML
INJECTION, SOLUTION INTRAMUSCULAR; INTRAVENOUS AS NEEDED
Status: DISCONTINUED | OUTPATIENT
Start: 2023-11-06 | End: 2023-11-06

## 2023-11-06 RX ORDER — SODIUM CHLORIDE, SODIUM LACTATE, POTASSIUM CHLORIDE, CALCIUM CHLORIDE 600; 310; 30; 20 MG/100ML; MG/100ML; MG/100ML; MG/100ML
125 INJECTION, SOLUTION INTRAVENOUS CONTINUOUS
Status: ACTIVE | OUTPATIENT
Start: 2023-11-06 | End: 2023-11-06

## 2023-11-06 RX ORDER — PROPOFOL 10 MG/ML
INJECTION, EMULSION INTRAVENOUS CONTINUOUS PRN
Status: DISCONTINUED | OUTPATIENT
Start: 2023-11-06 | End: 2023-11-06

## 2023-11-06 RX ORDER — MIDAZOLAM HYDROCHLORIDE 2 MG/2ML
INJECTION, SOLUTION INTRAMUSCULAR; INTRAVENOUS AS NEEDED
Status: DISCONTINUED | OUTPATIENT
Start: 2023-11-06 | End: 2023-11-06

## 2023-11-06 RX ORDER — POVIDONE-IODINE 10 MG/G
OINTMENT TOPICAL AS NEEDED
Status: DISCONTINUED | OUTPATIENT
Start: 2023-11-06 | End: 2023-11-06 | Stop reason: HOSPADM

## 2023-11-06 RX ORDER — TRAMADOL HYDROCHLORIDE 50 MG/1
50 TABLET ORAL EVERY 6 HOURS PRN
Status: DISCONTINUED | OUTPATIENT
Start: 2023-11-06 | End: 2023-11-06 | Stop reason: HOSPADM

## 2023-11-06 RX ORDER — CEFAZOLIN SODIUM 2 G/50ML
2000 SOLUTION INTRAVENOUS ONCE
Status: COMPLETED | OUTPATIENT
Start: 2023-11-06 | End: 2023-11-06

## 2023-11-06 RX ADMIN — FENTANYL CITRATE 25 MCG: 50 INJECTION INTRAMUSCULAR; INTRAVENOUS at 11:51

## 2023-11-06 RX ADMIN — CEFAZOLIN SODIUM 2000 MG: 2 SOLUTION INTRAVENOUS at 11:30

## 2023-11-06 RX ADMIN — CHLORHEXIDINE GLUCONATE 15 ML: 1.2 RINSE ORAL at 10:43

## 2023-11-06 RX ADMIN — SODIUM CHLORIDE, SODIUM LACTATE, POTASSIUM CHLORIDE, AND CALCIUM CHLORIDE 125 ML/HR: .6; .31; .03; .02 INJECTION, SOLUTION INTRAVENOUS at 10:19

## 2023-11-06 RX ADMIN — ONDANSETRON 4 MG: 2 INJECTION INTRAMUSCULAR; INTRAVENOUS at 11:56

## 2023-11-06 RX ADMIN — EPHEDRINE SULFATE 10 MG: 50 INJECTION, SOLUTION INTRAVENOUS at 11:55

## 2023-11-06 RX ADMIN — EPHEDRINE SULFATE 5 MG: 50 INJECTION, SOLUTION INTRAVENOUS at 11:49

## 2023-11-06 RX ADMIN — FENTANYL CITRATE 50 MCG: 50 INJECTION INTRAMUSCULAR; INTRAVENOUS at 11:35

## 2023-11-06 RX ADMIN — MIDAZOLAM HYDROCHLORIDE 2 MG: 1 INJECTION, SOLUTION INTRAMUSCULAR; INTRAVENOUS at 11:30

## 2023-11-06 RX ADMIN — FENTANYL CITRATE 25 MCG: 50 INJECTION INTRAMUSCULAR; INTRAVENOUS at 11:44

## 2023-11-06 RX ADMIN — PROPOFOL 100 MCG/KG/MIN: 10 INJECTION, EMULSION INTRAVENOUS at 11:35

## 2023-11-06 NOTE — OP NOTE
OPERATIVE REPORT  PATIENT NAME: Sarahy Paez    :  1945  MRN: [id number]  Pt Location: [facility] OR ROOM 03    SURGERY DATE: 2023    Surgeon(s) and Role:     * Reyna Jimenez DO - Primary     * Vivia Klinefelter, PA-C - Assisting    Preop Diagnosis:  Carpal tunnel syndrome of left wrist [G56.02]    Post-Op Diagnosis Codes:     * Carpal tunnel syndrome of left wrist [G56.02]    Procedure(s):  Left - RELEASE CARPAL TUNNEL  Subcutaneous fasciotomy  Synovial biopsy  Application of volar splint    Specimen(s):  tenosynovium    Estimated Blood Loss:   Minimal    Drains:  none    Anesthesia Type:   IV Sedation with  Local Anesthesia    Operative Indications:  Carpal tunnel syndrome of left wrist [G56.02]      Operative Findings:  TT: 17    Complications:   None    Procedure and Technique:    The patient was properly identified and brought to the operative suite. She was given a dose of intravenous antibiotics. After successful induction of the IV sedation a tourniquet was placed on patient's left proximal arm. The left upper extremity was then prepped and draped in usual sterile fashion. It was medically necessary that the physician assistant in the room that position and applying  the appropriate amount retraction the patient. A qualified resident was not available. The physician assistants roles were integral to the success of this case. He assisted in positioning, draping, retraction soft tissue, retraction neurovascular bundle, assisted with closure of the wound and placement of the splint    The surgical landmarks were outlined with a  skin marker. An Esmarch was used to exsanuate the left upper extremity. The tourniquet was then inflated. 1% lidocaine was used for local infiltration. .  Using a #15. Scalpel blade, a longitudinal   incision was made in the patient's left palm starting at the wrist crease extending distally for approximately 6-7 cm.   Hemostasis was achieved with the use of electrocautery. This layer was brought down to the  subcutaneous fat layer. Through further blunt dissection the palmar fascia layer was then located. Using 2nd #15. Scalpel blade the palmar fascia was then divided line with its fibers. The hypertrophic transverse carpal ligament then located. It is volar fibers were incised with a #15. Scalpel blade. As its terminal fibers were being  reached, a hemostat  was placed underneath this and  above the median nerve. The remaining fibers of the transverse carpal ligament ligament was then divided distally to the level of palmar arch and proximally to the level of the wrist crease. There was still some stenosis proximally and remaining fibers of the transverse carpal ligament were then  divided. A small subcutaneous fasciotomy did occur. Median nerve was inspected. The nerve was quite compressed and had a bluish tinge indicative of chronic compression as well. There was a tremendous amount of  tenosynovium present, and  a small portion was sent for biopsy purposes. The wound was then irrigated and closed with 4-0 nylon and dressed with ointment Xeroform 4 x 4 sterile a volar splint and Ace bandage    There was no complications from the procedure. He was  Transferred to his  hospital bed and went to the PACU in stable condition. I was present for the entire procedure., A qualified resident physician was not available. , and A physician assistant was required during the procedure for retraction, tissue handling, dissection and suturing.     Patient Disposition:  PACU         SIGNATURE: Marlene Tee DO  DATE: November 6, 2023  TIME: 11:33 AM

## 2023-11-06 NOTE — ANESTHESIA PREPROCEDURE EVALUATION
Procedure:  RELEASE CARPAL TUNNEL (Left: Wrist)    Relevant Problems   CARDIO   (+) Essential hypertension   (+) Hypercholesterolemia      ENDO   (+) Type 2 diabetes mellitus with complication, without long-term current use of insulin (HCC)      GI/HEPATIC   (+) Gastroesophageal reflux disease without esophagitis      /RENAL   (+) Enlarged prostate with lower urinary tract symptoms (LUTS)      MUSCULOSKELETAL   (+) Arthritis of knee, right        Physical Exam    Airway    Mallampati score: II  TM Distance: >3 FB  Neck ROM: full     Dental    upper dentures    Cardiovascular  Cardiovascular exam normal    Pulmonary  Pulmonary exam normal     Other Findings        Anesthesia Plan  ASA Score- 3     Anesthesia Type- IV sedation with anesthesia with ASA Monitors. Additional Monitors:     Airway Plan:            Plan Factors-    Chart reviewed. EKG reviewed. Existing labs reviewed. Patient summary reviewed. Obstructive sleep apnea risk education given perioperatively. Induction- intravenous. Postoperative Plan-     Informed Consent- Anesthetic plan and risks discussed with patient. I personally reviewed this patient with the CRNA. Discussed and agreed on the Anesthesia Plan with the CRNA. Carroll Speaker

## 2023-11-06 NOTE — INTERVAL H&P NOTE
H&P reviewed. After examining the patient I find no changes in the patients condition since the H&P had been written.     Vitals:    11/06/23 1009   BP: (!) 182/87   Pulse: 69   Resp: 18   Temp: (!) 97 °F (36.1 °C)   SpO2: 100%

## 2023-11-06 NOTE — DISCHARGE INSTR - AVS FIRST PAGE
POST-OPERATIVE INFORMATION  HAND SURGERY    Keep your hand elevated above heart level as much as possible during the first 48 hours after surgery to minimize swelling. Exercise your fingers and elbow regularly by moving them in all directions. Try to make gentle fists or touch each finger to your thumb. Do not remove the splint until you are seen in our office for follow up. If the bandage begins to feel to tight, you may unwrap it and loosen it. You should do this if you notice significant hand swelling. You may change the dressing after 2 days or if it gets wet or dirty  You may shower after 2 days, but keep your hand and dressing dry. Call our office if you experience pain not controlled by your medicine, develop a fever, experience increased drainage or redness around the incision site. If a post-op follow up appointment is not arranged before you leave the hospital, call our office the day following surgery to schedule this appointment. You should start therapy in about 2 days after surgery, or as previously scheduled.

## 2023-11-08 ENCOUNTER — TELEPHONE (OUTPATIENT)
Dept: OBGYN CLINIC | Facility: MEDICAL CENTER | Age: 78
End: 2023-11-08

## 2023-11-08 ENCOUNTER — TELEPHONE (OUTPATIENT)
Dept: INTERNAL MEDICINE CLINIC | Facility: CLINIC | Age: 78
End: 2023-11-08

## 2023-11-08 DIAGNOSIS — R11.2 NAUSEA AND VOMITING, UNSPECIFIED VOMITING TYPE: Primary | ICD-10-CM

## 2023-11-08 RX ORDER — ONDANSETRON HYDROCHLORIDE 8 MG/1
8 TABLET, FILM COATED ORAL EVERY 8 HOURS PRN
Qty: 20 TABLET | Refills: 0 | Status: SHIPPED | OUTPATIENT
Start: 2023-11-08

## 2023-11-08 NOTE — TELEPHONE ENCOUNTER
Called and spoke with pt, pt po 11/6/23 left wrist carpal tunnel release. 11/7/23 Patient did not feel right and he ate lunch and he thre up his lunch. He is currently dry heaving or having heart burn. Pt has not been around anyone that is sick, no diarrhea, pt had a bowel movement this morning. Pt feels he may have a little fever but has not checked. Pt has vomited today. Had a cheese sandwhich and that stayed down. Pt has not been drinking a lot of water but has been drinking coke and that has been making him burp. Pt drank a small amt of coffee. Pt took metformin, lisinopril and medication for acid reflux 11/7/23. Pt did not take any pain medication. Pt does not have any wrist pain. PT checked blood sugar and it was 125 yesterday. Pt has not checked BS today. Pt has been urinating throughout the day, pt mouth does not feel dry. Advised the pt to eat light like broth, crackers and toast if he is able to keep food down, advised to steer clear from cola products as that will increase burping and GERD, he may want to try room temp gingerale, water, hot tea. Further added to check blood sugar increase is his sick as he blood sugar can elevate with illnes, further suggested he take his temperature. He has PT tomorrow, advised to call PT to ask if he should reschedule appt.      Please advise further

## 2023-11-08 NOTE — TELEPHONE ENCOUNTER
Summer Townsend had CTS on Monday and tues he started to vomit, he has been vomiting since then, not so much now but when he eats, it comes right up. The surgeon said to call the family  for this. He is using Petobismal and tums, this is not working. Any suggestions please.  thanks

## 2023-11-08 NOTE — TELEPHONE ENCOUNTER
Caller:  Tessa Christina     Doctor:  Dr. David Norwood     Reason for call: The patient had  RELEASE CARPAL TUNNEL (Left ) and has been vomiting since On Tuesday 11/7 at lunchtime. It has not stopped. He has been taking Pepto Bismol but that is not helping.       Call back#: [phone]

## 2023-11-08 NOTE — TELEPHONE ENCOUNTER
Called pt back and relayed Dr Justin Lopes and pt took temp and its 98 and his blood sugar is 152.  Pt states he will call his PCP

## 2023-11-09 NOTE — TELEPHONE ENCOUNTER
Patient aware , he feels a little better today, he will get the medicine and see how it works, he will call back for an appt if he needs one he said.

## 2023-11-10 PROCEDURE — 88304 TISSUE EXAM BY PATHOLOGIST: CPT | Performed by: PATHOLOGY

## 2023-11-13 ENCOUNTER — EVALUATION (OUTPATIENT)
Dept: OCCUPATIONAL THERAPY | Facility: CLINIC | Age: 78
End: 2023-11-13
Payer: COMMERCIAL

## 2023-11-13 DIAGNOSIS — G56.02 CARPAL TUNNEL SYNDROME OF LEFT WRIST: Primary | ICD-10-CM

## 2023-11-13 PROCEDURE — 97166 OT EVAL MOD COMPLEX 45 MIN: CPT

## 2023-11-13 PROCEDURE — 97535 SELF CARE MNGMENT TRAINING: CPT

## 2023-11-13 NOTE — PROGRESS NOTES
OT Evaluation     Today's date: 2023  Patient name: Hany Santos  : 1945  MRN: [id number]  Referring provider: Barry Granados  Dx:   Encounter Diagnosis     ICD-10-CM    1. Carpal tunnel syndrome of left wrist  G56.02 Ambulatory Referral to Occupational Therapy                     Assessment  Assessment details: Patient presenting with OP OT services with a diagnosis of CTR. Patient had surgery completed on 2023 by Dr. Jade Winters. Patient reports symptoms started several years ago. Patient had EMG completed six months ago. Patient's next follow-up appointment is on 2023. Patient presenting with post-operative dressing. Patient presenting with increased swelling, increased pain, decreased fine motor coordination, surgical incision and decreased strength. Impairments: abnormal coordination, abnormal or restricted ROM and impaired physical strength    Symptom irritability: moderateUnderstanding of Dx/Px/POC: good   Prognosis: good    Goals  STGs    Pt will increase  strength by 5-10#. - Not Met    Pt will increase wrist strength by 1/2 grade. - Not Met    Pt will increase wrist ROM by 50%. - Not Met     Pt will demonstrate decrease in edema by 25%. - Not Met    Initiate with HEP. - Not Met    Pt will reports no more than half of current pain rate with activity. - Not Met    Pt will increase fine motor coordination as evident by an improvement in 9-hole peg test by 3 seconds. - Not Met    LTGs     Pt will increase  strength by an additional 5-10#. - Not Met    Pt will increase wrist strength by 1-2 grade. - Not Met    Pt will increase wrist ROM to [facility]. - Not Met     Pt will demonstrate decrease in edema by 50%.  - Not Met    Pt will increase pinch strength by 3-5#. - Not Met    Pt will report an increase in ADL/IADL participation. - Not Met    Pt will report no more than a 0/10 pain with activity - Not Met    Pt will increase fine motor coordination as evident by an improvement in 9-hole peg test by 5 seconds. - Not Met        Plan  Plan details: Patient has presenting to OP OT services with a dx of L CTR. Patient demonstrating increased pain, decreased strength, decreased ROM and decreased activity. Pt would benefit from continued Occupational Therapy services one time per week for 4 weeks to return to prior level of function and achieve all established goals. Once patient has their sutures removed, increased visits per week may be recommended. Thank you for the referral!    Patient would benefit from: OT eval, skilled occupational therapy and custom splinting  Referral necessary: Yes  Planned modality interventions: ultrasound, unattended electrical stimulation, thermotherapy: hydrocollator packs, cryotherapy and thermotherapy: paraffin bath  Planned therapy interventions: massage, manual therapy, joint mobilization, patient education, strengthening, stretching, fine motor coordination training, home exercise program, neuromuscular re-education, self care, therapeutic exercise and therapeutic activities  Frequency: 1x week  Duration in visits: 4  Duration in weeks: 4  Treatment plan discussed with: patient        Subjective Evaluation    History of Present Illness  Date of onset: 2023  Mechanism of injury: surgery  Mechanism of injury: S/p L CTR          Not a recurrent problem   Quality of life: good    Patient Goals  Patient goals for therapy: decreased edema, increased motion, increased strength and independence with ADLs/IADLs    Pain  Current pain ratin  At best pain ratin  At worst pain ratin  Location: L Wrist    Hand dominance: right      Diagnostic Tests  EMG: abnormal  Treatments  Current treatment: occupational therapy        Objective     Observations     Left Wrist/Hand   Positive for incision.      Additional Observation Details  Patient presenting with 3 cm incision along left wrist with sutures present and routine healing    Neurological Testing     Sensation Wrist/Hand   Left   Intact: light touch    Right   Intact: light touch    Active Range of Motion     Left Wrist   Wrist flexion: 40 degrees   Wrist extension: 40 degrees   Radial deviation: 30 degrees   Ulnar deviation: 40 degrees     Right Wrist   Wrist flexion: 30 degrees   Wrist extension: 30 degrees   Radial deviation: 15 degrees   Ulnar deviation: 30 degrees     Left Thumb     Opposition: WNL    Right Thumb   Opposition: WNL    Additional Active Range of Motion Details  Patient demonstrating with decreased left wrist ROM compared to norms  Soft composite fist noted    Strength/Myotome Testing     Left Wrist/Hand   Wrist extension: 3-  Wrist flexion: 3-  Radial deviation: 3-  Ulnar deviation: 3-     (2nd hand position)     Trial 1: 0    Thumb Strength  Key/Lateral Pinch     Trial 1: 0    Right Wrist/Hand   Wrist extension: 3-  Wrist flexion: 3-  Radial deviation: 3-  Ulnar deviation: 3-     (2nd hand position)     Trial 1: 55    Thumb Strength   Key/Lateral Pinch     Trial 1: 19    Additional Strength Details  Patient demonstrating with a decrease in wrist,  and pinch strength compared to unaffected upper extremity.       Swelling     Left Wrist/Hand   Circumference wrist: 17.9 cm    Additional Swelling Details  Slight increase in swelling in left compared to right    Right Wrist/Hand   Circumference wrist: 17.7 cm  Neuro Exam:     Functional outcomes   Left 9 peg hole test: 25.52 (seconds)  Right 9 hole peg test: 22.56 (seconds)      Flowsheet Rows      Flowsheet Row Most Recent Value   PT/OT G-Codes    Current Score 72   Projected Score 83                  Precautions s/p L CTR  Initial Evaluation completed on: 11/13/2023  Re-Evaluation needs to be completed before: 12/13/2023  Insurance: [employer] [address] [place] [address] Street: 11/13/2023  Auth Visits: N/A          Manuals 11/06/2023       Scar Massage Dressing Changed       Manual Massage        PROM Wrist All Planes        Edema Massage                Neuro Re-Ed  11/06/2023                                                               Ther Ex 11/06/2023       TGE HEP       Median Nerve Glide HEP       Prayer Stretch        Wrist AROM   Flex/Ext  RD/UD HEP       Digi-Flex        Wrist Maze        Hand Power Pro        Wrist Iso  Flexion  Extension  RD  UD                  Ther Activity 11/06/2023       Tension Pin w/ Pom Pom        Grooved Peg                                Modalities 11/06/2023       Ultrasound                CP Performed at end of session

## 2023-11-21 ENCOUNTER — OFFICE VISIT (OUTPATIENT)
Dept: OBGYN CLINIC | Facility: CLINIC | Age: 78
End: 2023-11-21

## 2023-11-21 VITALS
BODY MASS INDEX: 35.55 KG/M2 | SYSTOLIC BLOOD PRESSURE: 147 MMHG | WEIGHT: 240 LBS | DIASTOLIC BLOOD PRESSURE: 75 MMHG | HEIGHT: 69 IN | HEART RATE: 80 BPM

## 2023-11-21 DIAGNOSIS — Z98.890 HISTORY OF CARPAL TUNNEL RELEASE: Primary | ICD-10-CM

## 2023-11-21 PROCEDURE — 99024 POSTOP FOLLOW-UP VISIT: CPT | Performed by: ORTHOPAEDIC SURGERY

## 2023-11-21 NOTE — PROGRESS NOTES
Assessment/Plan:   There are no diagnoses linked to this encounter. Patient is 2 weeks s/p carpal tunnel release of his left wrist. Patient is progressing as expected post-operatively. Continue formal physical therapy as per protocol. Sutures were removed at time of visit, steri strips were applied. Patient can expose incision for hygiene purposes, but has been advised to avoid submersion. He will be seen in 4 weeks for strength and range of motion check. Patient expresses understanding and is in agreement with treatment plan. The patient is doing quite well in regards to his left carpal tunnel release. Sensation intact. Good thumb opposition. Continue home exercise program.  Follow-up in 1 month for wound check    Subjective:   Patient ID: Nelly Sutherland  1945     HPI  Patient is a 69 y.o. male who presents for post-operative evaluation of his left wrist. The patient is 2 weeks s/p CTR of his left wrist, completed on 11/6/23. The patient is doing well post-operatively. He denies pain in his wrist. He denies numbness and tingling. He reports nausea following surgery. Otherwise, he states he is satisfied with the surgical outcome.      The following portions of the patient's history were reviewed and updated as appropriate:  Past medical history, past surgical history, Family history, social history, current medications and allergies    Past Medical History:   Diagnosis Date    Arthritis of knee, right     LAST ASSESSED: 2/7/17    Diabetes mellitus ([address])     Does use hearing aid     jesus    Enlarged prostate     GERD (gastroesophageal reflux disease)     Headache     occ    History of gastroesophageal reflux (GERD)     LAST ASSESSED: 5/9/17    Hypercholesterolemia     LAST ASSESSED: AND RESOLVED: 1/9/18    Morbid (severe) obesity due to excess calories ([address])     LAST ASSESSED: 5/10/16    Pes anserine bursitis     LAST ASSESSED: 11/29/16    Testicular hypogonadism     LAST ASSESSED: 10/30/13    Tick bite 10/27/23 on abdomen    Wears dentures     full uppers       Past Surgical History:   Procedure Laterality Date    COLONOSCOPY      ELBOW SURGERY Right     PA NEUROPLASTY &/TRANSPOS MEDIAN NRV CARPAL TUNNE Left 2023    Procedure: RELEASE CARPAL TUNNEL;  Surgeon: Bo Gallagher DO;  Location: [facility];  Service: Orthopedics    UPPER GASTROINTESTINAL ENDOSCOPY      WRIST FUSION Right        Family History   Problem Relation Age of Onset    Diabetes type II Mother         MELLITUS    Rheumatic fever Mother     Heart disease Mother     No Known Problems Father        Social History     Socioeconomic History    Marital status: /Civil Union     Spouse name: None    Number of children: None    Years of education: None    Highest education level: None   Occupational History    Occupation: RETIRED   Tobacco Use    Smoking status: Former     Packs/day: 1.00     Years: 20.00     Total pack years: 20.00     Types: Cigarettes     Quit date: 3/10/1986     Years since quittin.7    Smokeless tobacco: Never   Vaping Use    Vaping Use: Never used   Substance and Sexual Activity    Alcohol use: Not Currently    Drug use: No    Sexual activity: Not Currently   Other Topics Concern    None   Social History Narrative    Retired        Lives with spouse      Social Determinants of Health     Financial Resource Strain: Low Risk  (10/10/2022)    Overall Financial Resource Strain (CARDIA)     Difficulty of Paying Living Expenses: Not hard at all   Food Insecurity: Not on file   Transportation Needs: No Transportation Needs (10/10/2022)    PRAPARE - Transportation     Lack of Transportation (Medical): No     Lack of Transportation (Non-Medical):  No   Physical Activity: Not on file   Stress: Not on file   Social Connections: Not on file   Intimate Partner Violence: Not on file   Housing Stability: Not on file         Current Outpatient Medications:     aspirin 81 MG tablet, Take 81 mg by mouth daily, Disp: , Rfl:     Coenzyme Q10 10 MG capsule, Take 10 mg by mouth daily, Disp: , Rfl:     Diclofenac Sodium (VOLTAREN) 1 %, Apply 2 g topically 4 (four) times a day (Patient taking differently: Apply 2 g topically 4 (four) times a day as needed), Disp: 60 g, Rfl: 4    fluticasone (FLONASE) 50 mcg/act nasal spray, 1 spray into each nostril daily (Patient taking differently: 1 spray into each nostril daily as needed), Disp: 48 g, Rfl: 1    glimepiride (AMARYL) 4 mg tablet, Take 1 tablet (4 mg total) by mouth 2 (two) times a day, Disp: 180 tablet, Rfl: 3    glucose blood (OneTouch Ultra) test strip, USE TO TEST BLOOD SUGAR DAILY, Disp: 200 each, Rfl: 1    lisinopril (ZESTRIL) 40 mg tablet, Take 1 tablet (40 mg total) by mouth daily, Disp: 90 tablet, Rfl: 1    metFORMIN (GLUCOPHAGE-XR) 500 mg 24 hr tablet, TAKE FOUR TABLETS BY MOUTH EVERY DAY WITH DINNER (Patient taking differently: 2 tabs am / 1 tab pm), Disp: 360 tablet, Rfl: 1    Multiple Vitamin (multivitamin) tablet, Take 1 tablet by mouth daily, Disp: , Rfl:     Omega-3 1000 MG CAPS, Take by mouth daily, Disp: , Rfl:     ondansetron (ZOFRAN) 8 mg tablet, Take 1 tablet (8 mg total) by mouth every 8 (eight) hours as needed for nausea or vomiting, Disp: 20 tablet, Rfl: 0    pantoprazole (PROTONIX) 20 mg tablet, Take 1 tablet (20 mg total) by mouth daily, Disp: 90 tablet, Rfl: 1    pioglitazone (ACTOS) 30 mg tablet, Take 1 tablet (30 mg total) by mouth daily (Patient taking differently: Take 30 mg by mouth every evening), Disp: 90 tablet, Rfl: 3    tamsulosin (FLOMAX) 0.4 mg, Take 1 capsule (0.4 mg total) by mouth daily with dinner, Disp: 90 capsule, Rfl: 3    linaGLIPtin 5 MG TABS, Take 5 mg by mouth daily (Patient not taking: Reported on 1/11/2023), Disp: , Rfl:     Allergies   Allergen Reactions    Pollen Extract Allergic Rhinitis    Tape [Medical Tape] Rash     BANDAID ADHESIVE       Review of Systems   Constitutional:  Negative for chills and fever.    HENT:  Negative for ear pain and sore throat. Eyes:  Negative for pain and visual disturbance. Respiratory:  Negative for cough and shortness of breath. Cardiovascular:  Negative for chest pain and palpitations. Gastrointestinal:  Negative for abdominal pain and vomiting. Genitourinary:  Negative for dysuria and hematuria. Musculoskeletal:  Negative for arthralgias and back pain. Skin:  Negative for color change and rash. Neurological:  Negative for seizures and syncope. All other systems reviewed and are negative. Objective:  /75   Pulse 80   Ht 5' 9" (1.753 m)   Wt 109 kg (240 lb)   BMI 35.44 kg/m²     Ortho Exam  left wrist -  Patient presents with no obvious anatomical deformity  Incision is healing well with no signs of erythema, dehiscence, infection  ROM WNL  MMT: 4/5 throughout  No soft tissue swelling or effusion noted  Full FDS, FDP, extensor mechanisms are intact  Demonstrates normal wrist, elbow, and shoulder motion  Forearm compartments are soft and supple  2+ Distal radial pulse with brisk capillary refill to the fingers  Radial, median, ulnar motor and sensory distribution intact  Sensation to light touch intact distally      Physical Exam  HENT:      Head: Normocephalic and atraumatic. Nose: Nose normal.   Eyes:      Conjunctiva/sclera: Conjunctivae normal.   Cardiovascular:      Rate and Rhythm: Normal rate. Pulmonary:      Effort: Pulmonary effort is normal.   Musculoskeletal:      Cervical back: Neck supple. Skin:     General: Skin is warm and dry. Capillary Refill: Capillary refill takes less than 2 seconds. Neurological:      Mental Status: He is alert and oriented to person, place, and time. Psychiatric:         Mood and Affect: Mood normal.         Behavior: Behavior normal.          Diagnostic Test Review:  No new imaging at time of visit. Procedures   None performed.     Scribe Attestation      I,:  Kevin Padilla am acting as a scribe while in the presence of the attending physician.:       I,:  Sole Haas DO personally performed the services described in this documentation    as scribed in my presence.:

## 2023-11-24 ENCOUNTER — OFFICE VISIT (OUTPATIENT)
Dept: OCCUPATIONAL THERAPY | Facility: CLINIC | Age: 78
End: 2023-11-24
Payer: COMMERCIAL

## 2023-11-24 DIAGNOSIS — G56.02 CARPAL TUNNEL SYNDROME OF LEFT WRIST: Primary | ICD-10-CM

## 2023-11-24 PROCEDURE — 97140 MANUAL THERAPY 1/> REGIONS: CPT

## 2023-11-24 PROCEDURE — 97530 THERAPEUTIC ACTIVITIES: CPT

## 2023-11-24 PROCEDURE — 97110 THERAPEUTIC EXERCISES: CPT

## 2023-11-24 PROCEDURE — 97035 APP MDLTY 1+ULTRASOUND EA 15: CPT

## 2023-11-24 NOTE — PROGRESS NOTES
Daily Note     Today's date: 2023  Patient name: Dipti Teague  : 1945  MRN: [id number]  Referring provider: Dianne Ashraf  Dx:   Encounter Diagnosis     ICD-10-CM    1. Carpal tunnel syndrome of left wrist  G56.02                      Subjective: "I just had my stitches taken out."      Objective: See treatment diary below      Assessment: Tolerated treatment well. Patient exhibited good technique with therapeutic exercises and would benefit from continued OT. Patient was just at Dr. Prisca Warren office on 2023 and is expected to return on 2024. Patient had sutures removed. Patient completed additional progressive exercises this date to increase UE strength and functional use. Therapist completed wound debridement and application of steri-stripes this date to increase wound healing. Plan: Continue per plan of care. Progress treatment as tolerated.           Precautions s/p L CTR  Initial Evaluation completed on: 2023  Re-Evaluation needs to be completed before: 2023  Insurance: [employer] [facility] [address]: 2023  Auth Visits: N/A          Manuals 2023      Scar Massage Dressing Changed Wound Debridement  Steri-Stripes Applied      Manual Massage  IASTM  #3  #6 10'      PROM Wrist All Planes        Edema Massage                Neuro Re-Ed  2023                                                              Ther Ex 2023      TGE HEP       Median Nerve Glide HEP       Prayer Stretch        Wrist AROM   Flex/Ext  RD/UD HEP   X 20  X 20      Digi-Flex        Wrist Maze  X 5      Hand Power Pro        Wrist Iso  Flexion  Extension  RD  UD                  Ther Activity 2023      Tension Pin w/ Pom Pom  YTP      Grooved Peg  2:39                              Modalities 2023      Ultrasound  3.3 MHz  50%  0.8  10'      [id number] [facility]        CP Performed at end of session Performed at end of session

## 2023-12-05 DIAGNOSIS — E11.8 TYPE 2 DIABETES MELLITUS WITH COMPLICATION, WITHOUT LONG-TERM CURRENT USE OF INSULIN (HCC): ICD-10-CM

## 2023-12-05 RX ORDER — METFORMIN HYDROCHLORIDE 500 MG/1
500 TABLET, EXTENDED RELEASE ORAL DAILY
Qty: 360 TABLET | Refills: 1 | Status: SHIPPED | OUTPATIENT
Start: 2023-12-05

## 2023-12-07 NOTE — PROGRESS NOTES
Daily Note     Today's date: 2023  Patient name: Cherie James  : 1945  MRN: [id number]  Referring provider: Renetta Heaton  Dx:   Encounter Diagnosis     ICD-10-CM    1. Carpal tunnel syndrome of left wrist  G56.02                      Subjective: I'm using it as much as I can. Objective: See treatment diary below      Assessment: Tolerated treatment well. Patient exhibited good technique with therapeutic exercises and would benefit from continued OT. Denies pain, numbness and tingling. Incision noted fully approximated. Advised pt to keep incision clean and dry since it's not fully closed, pt demonstrated good understanding of same. Small amount of devitalized tissue removed. Pt tolerated additional strengthening well. Plan: Continue per plan of care. Progress treatment as tolerated.        Precautions s/p L CTR  Initial Evaluation completed on: 2023  Re-Evaluation needs to be completed before: 2023  Insurance: [address]: 2023  Auth Visits: N/A          Manuals 2023     Scar Massage Dressing Changed Wound Debridement  Steri-Stripes Applied 10'     Manual Massage  IASTM  #3  #6 10' GT #3     PROM Wrist All Planes   performed     Edema Massage                Neuro Re-Ed  2023                                                             Ther Ex 2023     TGE HEP       Median Nerve Glide HEP       Prayer Stretch        Wrist AROM   Flex/Ext  RD/UD  Cw/CCW HEP   X 20  X 20   2# x 20  2# x 20  2# x 20     Digi-Flex   Blue x 20     Wrist Maze  X 5      Hand Power Pro   Blue RB x 20     Wrist Iso  Flexion  Extension  RD  UD          Flex bar  Twist  Bend  Bottle cap   Green  X 20  X 20  X 20     Ther Activity 2023     Tension Pin w/ Pom Pom  YTP RTP     Grooved Peg  2:39 2:18                             Modalities 2023 Ultrasound  3.3 MHz  50%  0.8  10'      [id number] [facility] Performed at end of session Performed at end of session

## 2023-12-08 ENCOUNTER — OFFICE VISIT (OUTPATIENT)
Dept: OCCUPATIONAL THERAPY | Facility: CLINIC | Age: 78
End: 2023-12-08
Payer: COMMERCIAL

## 2023-12-08 DIAGNOSIS — G56.02 CARPAL TUNNEL SYNDROME OF LEFT WRIST: Primary | ICD-10-CM

## 2023-12-08 PROCEDURE — 97110 THERAPEUTIC EXERCISES: CPT

## 2023-12-08 PROCEDURE — 97140 MANUAL THERAPY 1/> REGIONS: CPT

## 2023-12-15 ENCOUNTER — OFFICE VISIT (OUTPATIENT)
Dept: OCCUPATIONAL THERAPY | Facility: CLINIC | Age: 78
End: 2023-12-15
Payer: COMMERCIAL

## 2023-12-15 DIAGNOSIS — G56.02 CARPAL TUNNEL SYNDROME OF LEFT WRIST: Primary | ICD-10-CM

## 2023-12-15 PROCEDURE — 97140 MANUAL THERAPY 1/> REGIONS: CPT

## 2023-12-15 PROCEDURE — 97110 THERAPEUTIC EXERCISES: CPT

## 2023-12-15 NOTE — PROGRESS NOTES
Daily Note     Today's date: 12/15/2023  Patient name: Tasneem Tan  : 1945  MRN: [id number]  Referring provider: Jesu Barber  Dx:   Encounter Diagnosis     ICD-10-CM    1. Carpal tunnel syndrome of left wrist  G56.02                      Subjective: It feels good. Objective: See treatment diary below      Assessment: Tolerated treatment well. Patient exhibited good technique with therapeutic exercises, and would benefit from continued OT. Pt denies pain pre and post tx. Tolerated additional exercises well. Mod amount of devitalized tissue removed.  strength R 55#, L 38#, pinch lat R 15#, L 14#, 3 jaw R 15#, L 11#. Pt reports increase functional use of L hand. Plan: D/C OT tx today.      Precautions s/p L CTR  Initial Evaluation completed on: 2023  Re-Evaluation needs to be completed before: 2023  Insurance: [employer]p  FOTO: 2023  Auth Visits: N/A          Manuals 2023 2023 2023 12/15/2023    Scar Massage Dressing Changed Wound Debridement  Steri-Stripes Applied 10' 10'    Manual Massage  IASTM  #3  #6 10' GT #3     PROM Wrist All Planes   performed performed    Edema Massage                Neuro Re-Ed  2023 2023 2023 12/15/2023                                                            Ther Ex 2023 2023 2023 12/15/2023    TGE HEP       Median Nerve Glide HEP       Prayer Stretch        Wrist AROM   Flex/Ext  RD/UD  Cw/CCW HEP   X 20  X 20   2# x 20  2# x 20  2# x 20   2# x 20  2# x 20  2# x 20    Digi-Flex   Blue x 20 Blue x 20    Wrist Maze  X 5      Hand Power Pro   Blue RB x 20 Blue RB x 20  Green x 20    Power Web    Thumb  Intrinsic plus      Teracotta   X 20   X 20  X 20      Flex bar  Twist  Bend  Bottle cap   Green  X 20  X 20  X 20 Green  X 20  X 20  X 20    Ther Activity 2023 2023 2023 12/15/2023    Tension Pin w/ Pom Pom  YTP RTP RTP    Grooved Peg  2:39 2:18 2:20 Modalities 11/06/2023 11/24/2023 12/8/2023 12/15/2023    Ultrasound  3.3 MHz  50%  0.8  10' [id number] [facility]    Post tx    CP Performed at end of session Performed at end of session

## 2024-01-02 ENCOUNTER — OFFICE VISIT (OUTPATIENT)
Dept: OBGYN CLINIC | Facility: CLINIC | Age: 79
End: 2024-01-02

## 2024-01-02 VITALS
DIASTOLIC BLOOD PRESSURE: 78 MMHG | WEIGHT: 240 LBS | HEART RATE: 86 BPM | BODY MASS INDEX: 35.55 KG/M2 | HEIGHT: 69 IN | SYSTOLIC BLOOD PRESSURE: 136 MMHG

## 2024-01-02 DIAGNOSIS — Z98.890 HISTORY OF CARPAL TUNNEL RELEASE: Primary | ICD-10-CM

## 2024-01-02 PROCEDURE — 99024 POSTOP FOLLOW-UP VISIT: CPT | Performed by: ORTHOPAEDIC SURGERY

## 2024-01-02 NOTE — PROGRESS NOTES
Assessment/Plan:   Diagnoses and all orders for this visit:    History of carpal tunnel release         Patient is 6 weeks s/p carpal tunnel release of his left wrist. Patient is progressing well post-operatively. He may transition to a home exercise program, as tolerated. He may return to normal activities. He will be seen on an as needed basis. Patient expresses understanding and is in agreement with treatment plan.        The patient is doing quite well in regards to his left carpal tunnel release.  There is full strength full motion.  Incision clean and dry.  Continue home exercise program.  Follow-up on an as-needed basis.  If his condition changes, he would not hesitate to let us know    Subjective:   Patient ID: García Varela  1945     HPI  Patient is a 78 y.o. male who presents for post-operative evaluation of his left wrist. The patient is 6 weeks s/p CTR of his left wrist, completed on 11/6/23. The patient states that he has returned to his activities of daily living. He denies weakness. He denies pain in his wrist. He denies numbness and tingling. He reports nausea following surgery. The patient is pleased with his surgical outcome.     The following portions of the patient's history were reviewed and updated as appropriate:  Past medical history, past surgical history, Family history, social history, current medications and allergies    Past Medical History:   Diagnosis Date    Arthritis of knee, right     LAST ASSESSED: 2/7/17    Diabetes mellitus (HCC)     Does use hearing aid     jesus    Enlarged prostate     GERD (gastroesophageal reflux disease)     Headache     occ    History of gastroesophageal reflux (GERD)     LAST ASSESSED: 5/9/17    Hypercholesterolemia     LAST ASSESSED: AND RESOLVED: 1/9/18    Morbid (severe) obesity due to excess calories (HCC)     LAST ASSESSED: 5/10/16    Pes anserine bursitis     LAST ASSESSED: 11/29/16    Testicular hypogonadism     LAST ASSESSED: 10/30/13    Tick  bite     10/27/23 on abdomen    Wears dentures     full uppers       Past Surgical History:   Procedure Laterality Date    COLONOSCOPY      ELBOW SURGERY Right     TX NEUROPLASTY &/TRANSPOS MEDIAN NRV CARPAL TUNNE Left 2023    Procedure: RELEASE CARPAL TUNNEL;  Surgeon: Barry Lang DO;  Location: [facility];  Service: Orthopedics    UPPER GASTROINTESTINAL ENDOSCOPY      WRIST FUSION Right        Family History   Problem Relation Age of Onset    Diabetes type II Mother         MELLITUS    Rheumatic fever Mother     Heart disease Mother     No Known Problems Father        Social History     Socioeconomic History    Marital status: /Civil Union     Spouse name: None    Number of children: None    Years of education: None    Highest education level: None   Occupational History    Occupation: RETIRED   Tobacco Use    Smoking status: Former     Current packs/day: 0.00     Average packs/day: 1 pack/day for 20.0 years (20.0 ttl pk-yrs)     Types: Cigarettes     Start date: 3/10/1966     Quit date: 3/10/1986     Years since quittin.8    Smokeless tobacco: Never   Vaping Use    Vaping status: Never Used   Substance and Sexual Activity    Alcohol use: Not Currently    Drug use: No    Sexual activity: Not Currently   Other Topics Concern    None   Social History Narrative    Retired        Lives with spouse      Social Determinants of Health     Financial Resource Strain: Low Risk  (10/10/2022)    Overall Financial Resource Strain (CARDIA)     Difficulty of Paying Living Expenses: Not hard at all   Food Insecurity: Not on file   Transportation Needs: No Transportation Needs (10/10/2022)    PRAPARE - Transportation     Lack of Transportation (Medical): No     Lack of Transportation (Non-Medical): No   Physical Activity: Not on file   Stress: Not on file   Social Connections: Not on file   Intimate Partner Violence: Not on file   Housing Stability: Not on file         Current Outpatient Medications:      aspirin 81 MG tablet, Take 81 mg by mouth daily, Disp: , Rfl:     Coenzyme Q10 10 MG capsule, Take 10 mg by mouth daily, Disp: , Rfl:     Diclofenac Sodium (VOLTAREN) 1 %, Apply 2 g topically 4 (four) times a day (Patient taking differently: Apply 2 g topically 4 (four) times a day as needed), Disp: 60 g, Rfl: 4    fluticasone (FLONASE) 50 mcg/act nasal spray, 1 spray into each nostril daily (Patient taking differently: 1 spray into each nostril daily as needed), Disp: 48 g, Rfl: 1    glimepiride (AMARYL) 4 mg tablet, Take 1 tablet (4 mg total) by mouth 2 (two) times a day, Disp: 180 tablet, Rfl: 3    glucose blood (OneTouch Ultra) test strip, USE TO TEST BLOOD SUGAR DAILY, Disp: 200 each, Rfl: 1    lisinopril (ZESTRIL) 40 mg tablet, Take 1 tablet (40 mg total) by mouth daily, Disp: 90 tablet, Rfl: 1    metFORMIN (GLUCOPHAGE-XR) 500 mg 24 hr tablet, Take 1 tablet (500 mg total) by mouth daily, Disp: 360 tablet, Rfl: 1    Multiple Vitamin (multivitamin) tablet, Take 1 tablet by mouth daily, Disp: , Rfl:     Omega-3 1000 MG CAPS, Take by mouth daily, Disp: , Rfl:     ondansetron (ZOFRAN) 8 mg tablet, Take 1 tablet (8 mg total) by mouth every 8 (eight) hours as needed for nausea or vomiting, Disp: 20 tablet, Rfl: 0    pantoprazole (PROTONIX) 20 mg tablet, Take 1 tablet (20 mg total) by mouth daily, Disp: 90 tablet, Rfl: 1    pioglitazone (ACTOS) 30 mg tablet, Take 1 tablet (30 mg total) by mouth daily (Patient taking differently: Take 30 mg by mouth every evening), Disp: 90 tablet, Rfl: 3    tamsulosin (FLOMAX) 0.4 mg, Take 1 capsule (0.4 mg total) by mouth daily with dinner, Disp: 90 capsule, Rfl: 3    linaGLIPtin 5 MG TABS, Take 5 mg by mouth daily (Patient not taking: Reported on 1/11/2023), Disp: , Rfl:     Allergies   Allergen Reactions    Pollen Extract Allergic Rhinitis    Tape [Medical Tape] Rash     BANDAID ADHESIVE       Review of Systems   Constitutional:  Negative for chills and fever.   HENT:  Negative  "for ear pain and sore throat.    Eyes:  Negative for pain and visual disturbance.   Respiratory:  Negative for cough and shortness of breath.    Cardiovascular:  Negative for chest pain and palpitations.   Gastrointestinal:  Negative for abdominal pain and vomiting.   Genitourinary:  Negative for dysuria and hematuria.   Musculoskeletal:  Negative for arthralgias and back pain.   Skin:  Negative for color change and rash.   Neurological:  Negative for seizures and syncope.   All other systems reviewed and are negative.       Objective:  /78   Pulse 86   Ht 5' 9\" (1.753 m)   Wt 109 kg (240 lb)   BMI 35.44 kg/m²     Ortho Exam  left wrist -  Patient presents with no obvious anatomical deformity  Incision is well-healed with no signs of erythema, dehiscence, infection  ROM WNL  - Tinel's   MMT: 5/5 throughout  No soft tissue swelling or effusion noted  Full FDS, FDP, extensor mechanisms are intact  Demonstrates normal wrist, elbow, and shoulder motion  Forearm compartments are soft and supple  2+ Distal radial pulse with brisk capillary refill to the fingers  Radial, median, ulnar motor and sensory distribution intact  Sensation to light touch intact distally      Physical Exam  HENT:      Head: Normocephalic and atraumatic.      Nose: Nose normal.   Eyes:      Conjunctiva/sclera: Conjunctivae normal.   Cardiovascular:      Rate and Rhythm: Normal rate.   Pulmonary:      Effort: Pulmonary effort is normal.   Musculoskeletal:      Cervical back: Neck supple.   Skin:     General: Skin is warm and dry.      Capillary Refill: Capillary refill takes less than 2 seconds.   Neurological:      Mental Status: He is alert and oriented to person, place, and time.   Psychiatric:         Mood and Affect: Mood normal.         Behavior: Behavior normal.          Diagnostic Test Review:  No new imaging at time of visit.     Procedures   None performed.    Scribe Attestation      I,:  Kevin RuelasElyse am acting as a " scribe while in the presence of the attending physician.:       I,:  Barry Lang, DO personally performed the services described in this documentation    as scribed in my presence.:

## 2024-02-22 ENCOUNTER — TELEPHONE (OUTPATIENT)
Dept: INTERNAL MEDICINE CLINIC | Facility: CLINIC | Age: 79
End: 2024-02-22

## 2024-02-22 NOTE — TELEPHONE ENCOUNTER
Pt called, tested positive for Covid this morning, symptoms started Tuesday night. Runny nose, body aches, low grade fever. Wondering what he should take for it. Took OTC mucinex felt some relief.

## 2024-02-23 ENCOUNTER — OFFICE VISIT (OUTPATIENT)
Dept: INTERNAL MEDICINE CLINIC | Facility: CLINIC | Age: 79
End: 2024-02-23
Payer: COMMERCIAL

## 2024-02-23 ENCOUNTER — APPOINTMENT (OUTPATIENT)
Dept: RADIOLOGY | Facility: CLINIC | Age: 79
End: 2024-02-23
Payer: COMMERCIAL

## 2024-02-23 VITALS
HEIGHT: 69 IN | DIASTOLIC BLOOD PRESSURE: 82 MMHG | SYSTOLIC BLOOD PRESSURE: 130 MMHG | BODY MASS INDEX: 36.2 KG/M2 | OXYGEN SATURATION: 96 % | TEMPERATURE: 97.6 F | WEIGHT: 244.4 LBS | HEART RATE: 78 BPM

## 2024-02-23 DIAGNOSIS — U07.1 COVID-19: ICD-10-CM

## 2024-02-23 DIAGNOSIS — J30.1 SEASONAL ALLERGIC RHINITIS DUE TO POLLEN: ICD-10-CM

## 2024-02-23 DIAGNOSIS — E11.8 TYPE 2 DIABETES MELLITUS WITH COMPLICATION, WITHOUT LONG-TERM CURRENT USE OF INSULIN (HCC): Primary | ICD-10-CM

## 2024-02-23 DIAGNOSIS — E66.01 OBESITY, MORBID (HCC): ICD-10-CM

## 2024-02-23 PROCEDURE — 99213 OFFICE O/P EST LOW 20 MIN: CPT | Performed by: INTERNAL MEDICINE

## 2024-02-23 PROCEDURE — 71046 X-RAY EXAM CHEST 2 VIEWS: CPT

## 2024-02-23 RX ORDER — NIRMATRELVIR AND RITONAVIR 300-100 MG
3 KIT ORAL 2 TIMES DAILY
Qty: 30 TABLET | Refills: 0 | Status: SHIPPED | OUTPATIENT
Start: 2024-02-23 | End: 2024-02-28

## 2024-02-23 RX ORDER — PIOGLITAZONEHYDROCHLORIDE 30 MG/1
30 TABLET ORAL EVERY EVENING
Qty: 90 TABLET | Refills: 1 | Status: SHIPPED | OUTPATIENT
Start: 2024-02-23 | End: 2024-08-21

## 2024-02-23 RX ORDER — FLUTICASONE PROPIONATE 50 MCG
1 SPRAY, SUSPENSION (ML) NASAL DAILY PRN
Qty: 16 ML | Refills: 5 | Status: SHIPPED | OUTPATIENT
Start: 2024-02-23 | End: 2024-08-21

## 2024-02-23 NOTE — PROGRESS NOTES
Name: García Varela      : 1945      MRN: [id number]  Encounter Provider: Villa Mari DO  Encounter Date: 2024   Encounter department: [facility]    Assessment & Plan     1. Type 2 diabetes mellitus with complication, without long-term current use of insulin (HCC)  -     Albumin / creatinine urine ratio; Future  -     Diabetic foot exam; Future  -     pioglitazone (ACTOS) 30 mg tablet; Take 1 tablet (30 mg total) by mouth every evening    2. Seasonal allergic rhinitis due to pollen  -     fluticasone (FLONASE) 50 mcg/act nasal spray; 1 spray into each nostril daily as needed for rhinitis (viral infection)    3. Obesity, morbid (HCC)    4. COVID-19  -     nirmatrelvir & ritonavir (Paxlovid, 300/100,) tablet therapy pack; Take 3 tablets by mouth 2 (two) times a day for 5 days Take 2 nirmatrelvir tablets + 1 ritonavir tablet together per dose  -     XR chest pa & lateral; Future; Expected date: 2024           Subjective      The patient noted URI symptoms starting Tuesday.  He subsequently checked for COVID and that was noted to be (+).        Review of Systems   Constitutional:  Negative for chills, fatigue and fever.   HENT: Negative.     Respiratory:  Negative for cough, chest tightness and shortness of breath.    Cardiovascular:  Negative for chest pain and palpitations.   Gastrointestinal:  Negative for abdominal pain, constipation, diarrhea, nausea and vomiting.   Genitourinary: Negative.    Musculoskeletal:  Negative for back pain and myalgias.   Skin: Negative.    Neurological: Negative.    Psychiatric/Behavioral:  Negative for dysphoric mood. The patient is not nervous/anxious.        Current Outpatient Medications on File Prior to Visit   Medication Sig   • aspirin 81 MG tablet Take 81 mg by mouth daily   • Coenzyme Q10 10 MG capsule Take 10 mg by mouth daily   • glimepiride (AMARYL) 4 mg tablet Take 1 tablet (4 mg total) by mouth 2 (two) times a day   • glucose  "blood (OneTouch Ultra) test strip USE TO TEST BLOOD SUGAR DAILY   • lisinopril (ZESTRIL) 40 mg tablet Take 1 tablet (40 mg total) by mouth daily   • metFORMIN (GLUCOPHAGE-XR) 500 mg 24 hr tablet Take 1 tablet (500 mg total) by mouth daily   • Multiple Vitamin (multivitamin) tablet Take 1 tablet by mouth daily   • Omega-3 1000 MG CAPS Take by mouth daily   • ondansetron (ZOFRAN) 8 mg tablet Take 1 tablet (8 mg total) by mouth every 8 (eight) hours as needed for nausea or vomiting   • pantoprazole (PROTONIX) 20 mg tablet Take 1 tablet (20 mg total) by mouth daily   • tamsulosin (FLOMAX) 0.4 mg Take 1 capsule (0.4 mg total) by mouth daily with dinner   • [DISCONTINUED] Diclofenac Sodium (VOLTAREN) 1 % Apply 2 g topically 4 (four) times a day (Patient taking differently: Apply 2 g topically 4 (four) times a day as needed)   • [DISCONTINUED] fluticasone (FLONASE) 50 mcg/act nasal spray 1 spray into each nostril daily (Patient taking differently: 1 spray into each nostril daily as needed)   • [DISCONTINUED] pioglitazone (ACTOS) 30 mg tablet Take 1 tablet (30 mg total) by mouth daily (Patient taking differently: Take 30 mg by mouth every evening)   • [DISCONTINUED] linaGLIPtin 5 MG TABS Take 5 mg by mouth daily (Patient not taking: Reported on 1/11/2023)       Objective     /82   Pulse 78   Temp 97.6 °F (36.4 °C)   Ht 5' 9\" (1.753 m)   Wt 111 kg (244 lb 6.4 oz)   SpO2 96%   BMI 36.09 kg/m²     Physical Exam  Vitals and nursing note reviewed.   Constitutional:       Appearance: He is well-developed.   HENT:      Head: Normocephalic and atraumatic.   Eyes:      Pupils: Pupils are equal, round, and reactive to light.   Cardiovascular:      Rate and Rhythm: Normal rate and regular rhythm.      Heart sounds: Normal heart sounds. No murmur heard.  Pulmonary:      Effort: Tachypnea present.      Breath sounds: Normal breath sounds. No stridor. No rales.   Abdominal:      General: Bowel sounds are normal. There is no " distension.      Palpations: Abdomen is soft.      Tenderness: There is no abdominal tenderness.   Musculoskeletal:         General: No deformity. Normal range of motion.      Cervical back: Normal range of motion and neck supple.   Skin:     General: Skin is warm and dry.   Neurological:      Mental Status: He is alert and oriented to person, place, and time.       Villa Mari, DO

## 2024-02-23 NOTE — PROGRESS NOTES
Diabetic Foot Exam    Patient's shoes and socks removed.    Right Foot/Ankle   Right Foot Inspection  Skin Exam: skin normal, skin intact, dry skin, callus and callus. No warmth, no erythema, no maceration, no abnormal color, no pre-ulcer and no ulcer.     Toe Exam: ROM and strength within normal limits.  no right toe deformity    Sensory   Monofilament testing: diminished    Vascular  Capillary refills: < 3 seconds  The right DP pulse is 2+. The right PT pulse is 2+.     Left Foot/Ankle  Left Foot Inspection  Skin Exam: skin normal, skin intact, dry skin and callus. No warmth, no erythema, no maceration, normal color, no pre-ulcer and no ulcer.     Toe Exam: ROM and strength within normal limits. No left toe deformity.     Sensory   Monofilament testing: diminished    Vascular  Capillary refills: < 3 seconds  The left DP pulse is 2+. The left PT pulse is 2+.     Assign Risk Category  No deformity present  Loss of protective sensation  No weak pulses  Risk: 1

## 2024-02-24 ENCOUNTER — HOSPITAL ENCOUNTER (EMERGENCY)
Facility: HOSPITAL | Age: 79
Discharge: HOME | End: 2024-02-24
Attending: FAMILY MEDICINE
Payer: COMMERCIAL

## 2024-02-24 ENCOUNTER — APPOINTMENT (EMERGENCY)
Dept: CT IMAGING | Facility: HOSPITAL | Age: 79
End: 2024-02-24
Payer: COMMERCIAL

## 2024-02-24 VITALS
TEMPERATURE: 97.7 F | DIASTOLIC BLOOD PRESSURE: 59 MMHG | HEIGHT: 69 IN | RESPIRATION RATE: 20 BRPM | SYSTOLIC BLOOD PRESSURE: 121 MMHG | WEIGHT: 244.71 LBS | BODY MASS INDEX: 36.24 KG/M2 | HEART RATE: 82 BPM | OXYGEN SATURATION: 93 %

## 2024-02-24 DIAGNOSIS — J32.9 SINUSITIS: ICD-10-CM

## 2024-02-24 DIAGNOSIS — U07.1 COVID: Primary | ICD-10-CM

## 2024-02-24 DIAGNOSIS — E83.42 HYPOMAGNESEMIA: ICD-10-CM

## 2024-02-24 DIAGNOSIS — R79.89 ELEVATED TROPONIN: ICD-10-CM

## 2024-02-24 LAB
2HR DELTA HS TROPONIN: 51 NG/L
ALBUMIN SERPL BCP-MCNC: 3.6 G/DL (ref 3.5–5)
ALP SERPL-CCNC: 74 U/L (ref 34–104)
ALT SERPL W P-5'-P-CCNC: 19 U/L (ref 7–52)
ANION GAP SERPL CALCULATED.3IONS-SCNC: 11 MMOL/L
AST SERPL W P-5'-P-CCNC: 19 U/L (ref 13–39)
BASOPHILS # BLD AUTO: 0.01 THOUSANDS/ÂΜL (ref 0–0.1)
BASOPHILS NFR BLD AUTO: 0 % (ref 0–1)
BILIRUB SERPL-MCNC: 0.67 MG/DL (ref 0.2–1)
BNP SERPL-MCNC: 14 PG/ML (ref 0–100)
BUN SERPL-MCNC: 18 MG/DL (ref 5–25)
CALCIUM SERPL-MCNC: 9.6 MG/DL (ref 8.4–10.2)
CARDIAC TROPONIN I PNL SERPL HS: 19 NG/L
CARDIAC TROPONIN I PNL SERPL HS: 70 NG/L
CHLORIDE SERPL-SCNC: 97 MMOL/L (ref 96–108)
CO2 SERPL-SCNC: 26 MMOL/L (ref 21–32)
CREAT SERPL-MCNC: 1.3 MG/DL (ref 0.6–1.3)
D DIMER PPP FEU-MCNC: 1.41 UG/ML FEU
EOSINOPHIL # BLD AUTO: 0.08 THOUSAND/ÂΜL (ref 0–0.61)
EOSINOPHIL NFR BLD AUTO: 1 % (ref 0–6)
ERYTHROCYTE [DISTWIDTH] IN BLOOD BY AUTOMATED COUNT: 13.2 % (ref 11.6–15.1)
GFR SERPL CREATININE-BSD FRML MDRD: 52 ML/MIN/1.73SQ M
GLUCOSE SERPL-MCNC: 255 MG/DL (ref 65–140)
HCT VFR BLD AUTO: 49.1 % (ref 36.5–49.3)
HGB BLD-MCNC: 15.7 G/DL (ref 12–17)
IMM GRANULOCYTES # BLD AUTO: 0.03 THOUSAND/UL (ref 0–0.2)
IMM GRANULOCYTES NFR BLD AUTO: 0 % (ref 0–2)
INR PPP: 1.13 (ref 0.84–1.19)
LACTATE SERPL-SCNC: 2.5 MMOL/L (ref 0.5–2)
LACTATE SERPL-SCNC: 3.4 MMOL/L (ref 0.5–2)
LYMPHOCYTES # BLD AUTO: 1.6 THOUSANDS/ÂΜL (ref 0.6–4.47)
LYMPHOCYTES NFR BLD AUTO: 18 % (ref 14–44)
MAGNESIUM SERPL-MCNC: 1.4 MG/DL (ref 1.9–2.7)
MCH RBC QN AUTO: 29.5 PG (ref 26.8–34.3)
MCHC RBC AUTO-ENTMCNC: 32 G/DL (ref 31.4–37.4)
MCV RBC AUTO: 92 FL (ref 82–98)
MONOCYTES # BLD AUTO: 0.24 THOUSAND/ÂΜL (ref 0.17–1.22)
MONOCYTES NFR BLD AUTO: 3 % (ref 4–12)
NEUTROPHILS # BLD AUTO: 7.12 THOUSANDS/ÂΜL (ref 1.85–7.62)
NEUTS SEG NFR BLD AUTO: 78 % (ref 43–75)
NRBC BLD AUTO-RTO: 0 /100 WBCS
PLATELET # BLD AUTO: 255 THOUSANDS/UL (ref 149–390)
PMV BLD AUTO: 10.5 FL (ref 8.9–12.7)
POTASSIUM SERPL-SCNC: 4.3 MMOL/L (ref 3.5–5.3)
PROCALCITONIN SERPL-MCNC: 0.09 NG/ML
PROT SERPL-MCNC: 7.4 G/DL (ref 6.4–8.4)
PROTHROMBIN TIME: 14.7 SECONDS (ref 11.6–14.5)
RBC # BLD AUTO: 5.33 MILLION/UL (ref 3.88–5.62)
SODIUM SERPL-SCNC: 134 MMOL/L (ref 135–147)
WBC # BLD AUTO: 9.08 THOUSAND/UL (ref 4.31–10.16)

## 2024-02-24 PROCEDURE — 84145 PROCALCITONIN (PCT): CPT | Performed by: FAMILY MEDICINE

## 2024-02-24 PROCEDURE — 93005 ELECTROCARDIOGRAM TRACING: CPT

## 2024-02-24 PROCEDURE — 94640 AIRWAY INHALATION TREATMENT: CPT

## 2024-02-24 PROCEDURE — 85610 PROTHROMBIN TIME: CPT | Performed by: FAMILY MEDICINE

## 2024-02-24 PROCEDURE — 84484 ASSAY OF TROPONIN QUANT: CPT | Performed by: FAMILY MEDICINE

## 2024-02-24 PROCEDURE — 99285 EMERGENCY DEPT VISIT HI MDM: CPT | Performed by: FAMILY MEDICINE

## 2024-02-24 PROCEDURE — 83605 ASSAY OF LACTIC ACID: CPT | Performed by: FAMILY MEDICINE

## 2024-02-24 PROCEDURE — 70450 CT HEAD/BRAIN W/O DYE: CPT

## 2024-02-24 PROCEDURE — 71275 CT ANGIOGRAPHY CHEST: CPT

## 2024-02-24 PROCEDURE — 36415 COLL VENOUS BLD VENIPUNCTURE: CPT | Performed by: FAMILY MEDICINE

## 2024-02-24 PROCEDURE — 85379 FIBRIN DEGRADATION QUANT: CPT | Performed by: FAMILY MEDICINE

## 2024-02-24 PROCEDURE — 96365 THER/PROPH/DIAG IV INF INIT: CPT

## 2024-02-24 PROCEDURE — 99285 EMERGENCY DEPT VISIT HI MDM: CPT

## 2024-02-24 PROCEDURE — 80053 COMPREHEN METABOLIC PANEL: CPT | Performed by: FAMILY MEDICINE

## 2024-02-24 PROCEDURE — 96366 THER/PROPH/DIAG IV INF ADDON: CPT

## 2024-02-24 PROCEDURE — 83880 ASSAY OF NATRIURETIC PEPTIDE: CPT | Performed by: FAMILY MEDICINE

## 2024-02-24 PROCEDURE — 85025 COMPLETE CBC W/AUTO DIFF WBC: CPT | Performed by: FAMILY MEDICINE

## 2024-02-24 PROCEDURE — 96375 TX/PRO/DX INJ NEW DRUG ADDON: CPT

## 2024-02-24 PROCEDURE — 83735 ASSAY OF MAGNESIUM: CPT | Performed by: FAMILY MEDICINE

## 2024-02-24 PROCEDURE — 87040 BLOOD CULTURE FOR BACTERIA: CPT | Performed by: FAMILY MEDICINE

## 2024-02-24 RX ORDER — MAGNESIUM SULFATE HEPTAHYDRATE 40 MG/ML
2 INJECTION, SOLUTION INTRAVENOUS ONCE
Status: COMPLETED | OUTPATIENT
Start: 2024-02-24 | End: 2024-02-24

## 2024-02-24 RX ORDER — IPRATROPIUM BROMIDE AND ALBUTEROL SULFATE 2.5; .5 MG/3ML; MG/3ML
SOLUTION RESPIRATORY (INHALATION)
Status: COMPLETED
Start: 2024-02-24 | End: 2024-02-24

## 2024-02-24 RX ORDER — METHYLPREDNISOLONE SODIUM SUCCINATE 125 MG/2ML
125 INJECTION, POWDER, LYOPHILIZED, FOR SOLUTION INTRAMUSCULAR; INTRAVENOUS ONCE
Status: COMPLETED | OUTPATIENT
Start: 2024-02-24 | End: 2024-02-24

## 2024-02-24 RX ORDER — AMOXICILLIN AND CLAVULANATE POTASSIUM 875; 125 MG/1; MG/1
1 TABLET, FILM COATED ORAL EVERY 12 HOURS SCHEDULED
Qty: 14 TABLET | Refills: 0 | Status: SHIPPED | OUTPATIENT
Start: 2024-02-24 | End: 2024-03-02

## 2024-02-24 RX ORDER — MAGNESIUM 30 MG
30 TABLET ORAL DAILY
Qty: 30 TABLET | Refills: 0 | Status: SHIPPED | OUTPATIENT
Start: 2024-02-24 | End: 2024-03-25

## 2024-02-24 RX ORDER — IPRATROPIUM BROMIDE AND ALBUTEROL SULFATE 2.5; .5 MG/3ML; MG/3ML
3 SOLUTION RESPIRATORY (INHALATION)
Status: DISCONTINUED | OUTPATIENT
Start: 2024-02-24 | End: 2024-02-24

## 2024-02-24 RX ORDER — ALBUTEROL SULFATE 2.5 MG/3ML
2.5 SOLUTION RESPIRATORY (INHALATION) ONCE
Status: COMPLETED | OUTPATIENT
Start: 2024-02-24 | End: 2024-02-24

## 2024-02-24 RX ADMIN — IPRATROPIUM BROMIDE AND ALBUTEROL SULFATE 3 ML: 2.5; .5 SOLUTION RESPIRATORY (INHALATION) at 07:59

## 2024-02-24 RX ADMIN — SODIUM CHLORIDE 1000 ML: 0.9 INJECTION, SOLUTION INTRAVENOUS at 09:33

## 2024-02-24 RX ADMIN — METHYLPREDNISOLONE SODIUM SUCCINATE 125 MG: 125 INJECTION, POWDER, FOR SOLUTION INTRAMUSCULAR; INTRAVENOUS at 07:55

## 2024-02-24 RX ADMIN — MAGNESIUM SULFATE HEPTAHYDRATE 2 G: 40 INJECTION, SOLUTION INTRAVENOUS at 09:25

## 2024-02-24 RX ADMIN — ALBUTEROL SULFATE 2.5 MG: 2.5 SOLUTION RESPIRATORY (INHALATION) at 07:58

## 2024-02-24 RX ADMIN — MAGNESIUM SULFATE HEPTAHYDRATE 2 G: 40 INJECTION, SOLUTION INTRAVENOUS at 10:10

## 2024-02-24 RX ADMIN — IOHEXOL 85 ML: 350 INJECTION, SOLUTION INTRAVENOUS at 08:57

## 2024-02-24 NOTE — ED PROVIDER NOTES
Lab history  Chief Complaint   Patient presents with    Shortness of Breath     Patient arrived via EMS after suddenly collapsing at home.  Patient recently diagnosed with covid and is reporting shortness of breath.  Patient was 75% on RA upon EMS arrival.      HPI  This Is a 78-year-old male with history of type 2 diabetes COVID-positive presented to ED with the complaint of shortness of breath and syncopal episode.  Patient states he started with symptoms on Wednesday saw her PCP on Thursday who started him on Paxlovid.  He states that he did took the first dose yesterday.  Patient states he got up this morning at his breakfast was walking out of the kitchen when felt dizzy and had a fall.  He denies any head strike.  Is denying any chest pain does complain of shortness of breath.  EMS was called patient was found to be hypoxic (75%) patient was placed on oxygen by EMS.  Patient was initially on 4 L in the ED.  Otherwise awake alert oriented times GCS 15.  Denies any chest pain at this time.  Prior to Admission Medications   Prescriptions Last Dose Informant Patient Reported? Taking?   Coenzyme Q10 10 MG capsule   Yes No   Sig: Take 10 mg by mouth daily   Multiple Vitamin (multivitamin) tablet   Yes No   Sig: Take 1 tablet by mouth daily   Omega-3 1000 MG CAPS   Yes No   Sig: Take by mouth daily   aspirin 81 MG tablet  Self Yes No   Sig: Take 81 mg by mouth daily   fluticasone (FLONASE) 50 mcg/act nasal spray   No No   Si spray into each nostril daily as needed for rhinitis (viral infection)   glimepiride (AMARYL) 4 mg tablet   No No   Sig: Take 1 tablet (4 mg total) by mouth 2 (two) times a day   glucose blood (OneTouch Ultra) test strip   No No   Sig: USE TO TEST BLOOD SUGAR DAILY   lisinopril (ZESTRIL) 40 mg tablet   No No   Sig: Take 1 tablet (40 mg total) by mouth daily   metFORMIN (GLUCOPHAGE-XR) 500 mg 24 hr tablet   No No   Sig: Take 1 tablet (500 mg total) by mouth daily   nirmatrelvir & ritonavir  (Paxlovid, 300/100,) tablet therapy pack   No No   Sig: Take 3 tablets by mouth 2 (two) times a day for 5 days Take 2 nirmatrelvir tablets + 1 ritonavir tablet together per dose   ondansetron (ZOFRAN) 8 mg tablet   No No   Sig: Take 1 tablet (8 mg total) by mouth every 8 (eight) hours as needed for nausea or vomiting   pantoprazole (PROTONIX) 20 mg tablet   No No   Sig: Take 1 tablet (20 mg total) by mouth daily   pioglitazone (ACTOS) 30 mg tablet   No No   Sig: Take 1 tablet (30 mg total) by mouth every evening   tamsulosin (FLOMAX) 0.4 mg   No No   Sig: Take 1 capsule (0.4 mg total) by mouth daily with dinner      Facility-Administered Medications: None       Past Medical History:   Diagnosis Date    Arthritis of knee, right     LAST ASSESSED: 2/7/17    Diabetes mellitus (HCC)     Does use hearing aid     jesus    Enlarged prostate     GERD (gastroesophageal reflux disease)     Headache     occ    History of gastroesophageal reflux (GERD)     LAST ASSESSED: 5/9/17    Hypercholesterolemia     LAST ASSESSED: AND RESOLVED: 1/9/18    Morbid (severe) obesity due to excess calories (HCC)     LAST ASSESSED: 5/10/16    Pes anserine bursitis     LAST ASSESSED: 11/29/16    Testicular hypogonadism     LAST ASSESSED: 10/30/13    Tick bite     10/27/23 on abdomen    Wears dentures     full uppers       Past Surgical History:   Procedure Laterality Date    COLONOSCOPY      ELBOW SURGERY Right     MI NEUROPLASTY &/TRANSPOS MEDIAN NRV CARPAL TUNNE Left 11/6/2023    Procedure: RELEASE CARPAL TUNNEL;  Surgeon: Barry Lang DO;  Location: [facility];  Service: Orthopedics    UPPER GASTROINTESTINAL ENDOSCOPY      WRIST FUSION Right        Family History   Problem Relation Age of Onset    Diabetes type II Mother         MELLITUS    Rheumatic fever Mother     Heart disease Mother     No Known Problems Father      I have reviewed and agree with the history as documented.    E-Cigarette/Vaping    E-Cigarette Use Never User       E-Cigarette/Vaping Substances    Nicotine No     THC No     CBD No     Flavoring No     Other No     Unknown No      Social History     Tobacco Use    Smoking status: Former     Current packs/day: 0.00     Average packs/day: 1 pack/day for 20.0 years (20.0 ttl pk-yrs)     Types: Cigarettes     Start date: 3/10/1966     Quit date: 3/10/1986     Years since quittin.9    Smokeless tobacco: Never   Vaping Use    Vaping status: Never Used   Substance Use Topics    Alcohol use: Not Currently    Drug use: No       Review of Systems   Constitutional: Negative.    HENT:  Positive for congestion.    Eyes: Negative.    Respiratory:  Positive for cough and shortness of breath.    Cardiovascular: Negative.    Gastrointestinal: Negative.    Endocrine: Negative.    Genitourinary: Negative.    Musculoskeletal: Negative.    Allergic/Immunologic: Negative.    Neurological:  Positive for syncope.   Hematological: Negative.    Psychiatric/Behavioral: Negative.         Physical Exam  Physical Exam  Vitals and nursing note reviewed.   Constitutional:       General: He is not in acute distress.     Appearance: He is well-developed. He is not diaphoretic.   HENT:      Head: Normocephalic and atraumatic.      Right Ear: External ear normal.      Left Ear: External ear normal.      Nose: Nose normal.      Mouth/Throat:      Mouth: Mucous membranes are moist.      Pharynx: Oropharynx is clear. No posterior oropharyngeal erythema.   Eyes:      Conjunctiva/sclera: Conjunctivae normal.      Pupils: Pupils are equal, round, and reactive to light.   Cardiovascular:      Rate and Rhythm: Normal rate and regular rhythm.      Pulses: Normal pulses.      Heart sounds: Normal heart sounds.   Pulmonary:      Effort: Pulmonary effort is normal. No respiratory distress.      Breath sounds: Normal breath sounds. No wheezing.   Abdominal:      General: Bowel sounds are normal. There is no distension.      Palpations: Abdomen is soft.       Tenderness: There is no abdominal tenderness.   Musculoskeletal:         General: Normal range of motion.      Cervical back: Normal range of motion and neck supple.   Lymphadenopathy:      Cervical: No cervical adenopathy.   Skin:     General: Skin is warm and dry.      Capillary Refill: Capillary refill takes less than 2 seconds.   Neurological:      Mental Status: He is alert and oriented to person, place, and time.   Psychiatric:         Mood and Affect: Mood normal.         Behavior: Behavior normal.         Vital Signs  ED Triage Vitals   Temperature Pulse Respirations Blood Pressure SpO2   02/24/24 0734 02/24/24 0734 02/24/24 0734 02/24/24 0740 02/24/24 0739   97.7 °F (36.5 °C) 84 20 97/56 97 %      Temp Source Heart Rate Source Patient Position - Orthostatic VS BP Location FiO2 (%)   02/24/24 0734 02/24/24 0734 02/24/24 0740 02/24/24 0740 --   Tympanic Monitor Sitting Right arm       Pain Score       02/24/24 0734       No Pain           Vitals:    02/24/24 0734 02/24/24 0740 02/24/24 1015 02/24/24 1145   BP:  97/56 112/57 121/59   Pulse: 84 72 80 82   Patient Position - Orthostatic VS:  Sitting Sitting Sitting         Visual Acuity      ED Medications  Medications   methylPREDNISolone sodium succinate (Solu-MEDROL) injection 125 mg (125 mg Intravenous Given 2/24/24 0755)   albuterol inhalation solution 2.5 mg (2.5 mg Nebulization Given 2/24/24 0758)   magnesium sulfate 2 g/50 mL IVPB (premix) 2 g (0 g Intravenous Stopped 2/24/24 1010)   sodium chloride 0.9 % bolus 1,000 mL (0 mL Intravenous Stopped 2/24/24 1033)   iohexol (OMNIPAQUE) 350 MG/ML injection (MULTI-DOSE) 85 mL (85 mL Intravenous Given 2/24/24 0857)   magnesium sulfate 2 g/50 mL IVPB (premix) 2 g (0 g Intravenous Stopped 2/24/24 1152)       Diagnostic Studies  Results Reviewed       Procedure Component Value Units Date/Time    Lactic acid 2 Hours [[id number]]  (Abnormal) Collected: 02/24/24 1013    Lab Status: Final result Specimen: Blood from  Arm, Left Updated: 02/24/24 1050     LACTIC ACID 3.4 mmol/L     Narrative:      Result may be elevated if tourniquet was used during collection.    HS Troponin I 2hr [[id number]]  (Abnormal) Collected: 02/24/24 1013    Lab Status: Final result Specimen: Blood from Arm, Left Updated: 02/24/24 1041     hs TnI 2hr 70 ng/L      Delta 2hr hsTnI 51 ng/L     Lactic acid, plasma (w/reflex if result > 2.0) [[id number]]  (Abnormal) Collected: 02/24/24 0800    Lab Status: Final result Specimen: Blood from Arm, Left Updated: 02/24/24 0837     LACTIC ACID 2.5 mmol/L     Narrative:      Result may be elevated if tourniquet was used during collection.    Procalcitonin [[id number]]  (Normal) Collected: 02/24/24 0800    Lab Status: Final result Specimen: Blood from Arm, Left Updated: 02/24/24 0835     Procalcitonin 0.09 ng/ml     HS Troponin 0hr (reflex protocol) [[id number]]  (Normal) Collected: 02/24/24 0800    Lab Status: Final result Specimen: Blood from Arm, Left Updated: 02/24/24 0832     hs TnI 0hr 19 ng/L     B-Type Natriuretic Peptide(BNP) [[id number]]  (Normal) Collected: 02/24/24 0800    Lab Status: Final result Specimen: Blood from Arm, Left Updated: 02/24/24 0831     BNP 14 pg/mL     D-dimer, quantitative [[id number]]  (Abnormal) Collected: 02/24/24 0800    Lab Status: Final result Specimen: Blood from Arm, Left Updated: 02/24/24 0830     D-Dimer, Quant 1.41 ug/ml FEU     Narrative:      In the evaluation for possible pulmonary embolism, in the appropriate (Well's Score of 4 or less) patient, the age adjusted d-dimer cutoff for this patient can be calculated as:    Age x 0.01 (in ug/mL) for Age-adjusted D-dimer exclusion threshold for a patient over 50 years.    Protime-INR [[id number]]  (Abnormal) Collected: 02/24/24 0800    Lab Status: Final result Specimen: Blood from Arm, Left Updated: 02/24/24 0830     Protime 14.7 seconds      INR 1.13    Comprehensive metabolic panel [[id number]]  (Abnormal) Collected: 02/24/24 0800     Lab Status: Final result Specimen: Blood from Arm, Left Updated: 02/24/24 0826     Sodium 134 mmol/L      Potassium 4.3 mmol/L      Chloride 97 mmol/L      CO2 26 mmol/L      ANION GAP 11 mmol/L      BUN 18 mg/dL      Creatinine 1.30 mg/dL      Glucose 255 mg/dL      Calcium 9.6 mg/dL      AST 19 U/L      ALT 19 U/L      Alkaline Phosphatase 74 U/L      Total Protein 7.4 g/dL      Albumin 3.6 g/dL      Total Bilirubin 0.67 mg/dL      eGFR 52 ml/min/1.73sq m     Narrative:      National Kidney Disease Foundation guidelines for Chronic Kidney Disease (CKD):     Stage 1 with normal or high GFR (GFR > 90 mL/min/1.73 square meters)    Stage 2 Mild CKD (GFR = 60-89 mL/min/1.73 square meters)    Stage 3A Moderate CKD (GFR = 45-59 mL/min/1.73 square meters)    Stage 3B Moderate CKD (GFR = 30-44 mL/min/1.73 square meters)    Stage 4 Severe CKD (GFR = 15-29 mL/min/1.73 square meters)    Stage 5 End Stage CKD (GFR <15 mL/min/1.73 square meters)  Note: GFR calculation is accurate only with a steady state creatinine    Magnesium [[id number]]  (Abnormal) Collected: 02/24/24 0800    Lab Status: Final result Specimen: Blood from Arm, Left Updated: 02/24/24 0826     Magnesium 1.4 mg/dL     CBC and differential [[id number]]  (Abnormal) Collected: 02/24/24 0800    Lab Status: Final result Specimen: Blood from Arm, Left Updated: 02/24/24 0811     WBC 9.08 Thousand/uL      RBC 5.33 Million/uL      Hemoglobin 15.7 g/dL      Hematocrit 49.1 %      MCV 92 fL      MCH 29.5 pg      MCHC 32.0 g/dL      RDW 13.2 %      MPV 10.5 fL      Platelets 255 Thousands/uL      nRBC 0 /100 WBCs      Neutrophils Relative 78 %      Immat GRANS % 0 %      Lymphocytes Relative 18 %      Monocytes Relative 3 %      Eosinophils Relative 1 %      Basophils Relative 0 %      Neutrophils Absolute 7.12 Thousands/µL      Immature Grans Absolute 0.03 Thousand/uL      Lymphocytes Absolute 1.60 Thousands/µL      Monocytes Absolute 0.24 Thousand/µL      Eosinophils  Absolute 0.08 Thousand/µL      Basophils Absolute 0.01 Thousands/µL     Blood culture #2 [[id number]] Collected: 02/24/24 0800    Lab Status: In process Specimen: Blood from Arm, Right Updated: 02/24/24 0807    Blood culture #1 [[id number]] Collected: 02/24/24 0800    Lab Status: In process Specimen: Blood from Arm, Left Updated: 02/24/24 0807                   CTA ED chest PE Study   Final Result by Lencho Arzate MD (02/24 1011)      Suboptimal contrast opacification. No pulmonary embolus in the central through segmental pulmonary arteries. No evidence of right heart strain.      No evidence of acute thoracic process.      Few punctate noncalcified pulmonary nodules with unknown long-term stability and doubtful clinical significance. Based on current Fleischner Society 2017 Guidelines on incidental pulmonary nodule, no routine follow-up is needed if the patient is low    risk. If the patient is high risk, optional follow-up chest CT at 12 months can be considered.                  Workstation performed: [id number]         CT head wo contrast   Final Result by Pallav N Shah, MD (02/24 0915)      No acute intracranial abnormality.      Moderate ethmoid and maxillary paranasal sinus disease with frothy secretions and mucoperiosteal thickening suggesting acute on chronic sinusitis.                  Workstation performed: [id number]                    Procedures  Procedures         ED Course  ED Course as of 02/24/24 1452   Sat Feb 24, 2024   0841 MAGNESIUM(!): 1.4  Replaced   0841 D-Dimer, Quant(!): 1.41  Will obtain CTA.   1022    Suboptimal contrast opacification. No pulmonary embolus in the central through segmental pulmonary arteries. No evidence of right heart strain.     No evidence of acute thoracic process.     Few punctate noncalcified pulmonary nodules with unknown long-term stability and doubtful clinical significance. Based on current Fleischner Society 2017 Guidelines on incidental pulmonary  nodule, no routine follow-up is needed if the patient is low     1035 Patient states he is feeling much better sitting comfortably in bed son is by bedside detailed discussion with patient and son precaution provided regarding return.                               SBIRT 22yo+      Flowsheet Row Most Recent Value   Initial Alcohol Screen: US AUDIT-C     1. How often do you have a drink containing alcohol? 0 Filed at: 02/24/2024 0741   2. How many drinks containing alcohol do you have on a typical day you are drinking?  0 Filed at: 02/24/2024 0741   3a. Male UNDER 65: How often do you have five or more drinks on one occasion? 0 Filed at: 02/24/2024 0741   3b. FEMALE Any Age, or MALE 65+: How often do you have 4 or more drinks on one occassion? 0 Filed at: 02/24/2024 0741   Audit-C Score 0 Filed at: 02/24/2024 0741   MARSHALL: How many times in the past year have you...    Used an illegal drug or used a prescription medication for non-medical reasons? Never Filed at: 02/24/2024 0741                      Medical Decision Making  This Is a 78-year-old male with history of type 2 diabetes COVID-positive presented to ED with the complaint of shortness of breath and syncopal episode.  Patient states he started with symptoms on Wednesday saw her PCP on Thursday who started him on Paxlovid.  He states that he did took the first dose yesterday.  Patient states he got up this morning at his breakfast was walking out of the kitchen when felt dizzy and had a fall.  He denies any head strike.  Is denying any chest pain does complain of shortness of breath.  EMS was called patient was found to be hypoxic (75%) patient was placed on oxygen by EMS.    DDX: bacterial sinusitis, pneumonia, meningitis, endocarditis/copd exacerbation/PE  Plan will obtain-Labs CBC BMP troponin BNP D-dimer patient was initially on nasal cannula then did a trial without oxygen patient did well was saturating 95% on room air.  Patient was treated with steroids  states feeling much better.  Lab reviewed which shows magnesium of 1.4 CT shows acute sinusitis that there is no PE that was noted.  Patient started on antibiotics.  Patient states he is feeling much better.  Lab also shows a elevated troponin EKG within normal limits discussed with cardiology on-call states that he looks benign patient can follow-up outpatient.  Son is by bedside multiple discussion with patient  Feel comfortable going home recommending following up with PCP.  Continue with vitamin D vitamin C and fluid resuscitation at home.  Patient verbalized understand the plan  Disposition discharge home with the strict precaution to return to the ED if symptom does not improve or worsen.    Amount and/or Complexity of Data Reviewed  Labs: ordered. Decision-making details documented in ED Course.  Radiology: ordered.    Risk  OTC drugs.  Prescription drug management.             Disposition  Final diagnoses:   COVID   Sinusitis   Hypomagnesemia   Elevated troponin     Time reflects when diagnosis was documented in both MDM as applicable and the Disposition within this note       Time User Action Codes Description Comment    2/24/2024 10:38 AM Ahmad, John Add [U07.1] COVID     2/24/2024 10:39 AM Ahmad, John Add [J32.9] Sinusitis     2/24/2024 10:39 AM Ahmad, John Add [E83.42] Hypomagnesemia     2/24/2024 11:54 AM Ahmad, John Add [R79.89] Elevated troponin           ED Disposition       ED Disposition   Discharge    Condition   Stable    Date/Time   Sat Feb 24, 2024 1022    Comment   García Varela discharge to home/self care.                   Follow-up Information       Follow up With Specialties Details Why Contact Info    Villa Mari, DO Internal Medicine Schedule an appointment as soon as possible for a visit in 2 days If symptoms worsen [address] [place] [id number]  [phone]              Discharge Medication List as of 2/24/2024 11:55 AM        START taking these medications    Details    amoxicillin-clavulanate (AUGMENTIN) 875-125 mg per tablet Take 1 tablet by mouth every 12 (twelve) hours for 7 days, Starting Sat 2/24/2024, Until Sat 3/2/2024, Normal      magnesium 30 MG tablet Take 1 tablet (30 mg total) by mouth in the morning, Starting Sat 2/24/2024, Until Mon 3/25/2024, Normal           CONTINUE these medications which have NOT CHANGED    Details   aspirin 81 MG tablet Take 81 mg by mouth daily, Historical Med      Coenzyme Q10 10 MG capsule Take 10 mg by mouth daily, Starting Tue 2/15/2022, Historical Med      fluticasone (FLONASE) 50 mcg/act nasal spray 1 spray into each nostril daily as needed for rhinitis (viral infection), Starting Fri 2/23/2024, Until Wed 8/21/2024 at 2359, Fill Later      glimepiride (AMARYL) 4 mg tablet Take 1 tablet (4 mg total) by mouth 2 (two) times a day, Starting Tue 9/12/2023, Until Sun 3/10/2024, Fill Later      glucose blood (OneTouch Ultra) test strip USE TO TEST BLOOD SUGAR DAILY, Normal      lisinopril (ZESTRIL) 40 mg tablet Take 1 tablet (40 mg total) by mouth daily, Starting Wed 10/25/2023, Normal      metFORMIN (GLUCOPHAGE-XR) 500 mg 24 hr tablet Take 1 tablet (500 mg total) by mouth daily, Starting Tue 12/5/2023, Normal      Multiple Vitamin (multivitamin) tablet Take 1 tablet by mouth daily, Historical Med      nirmatrelvir & ritonavir (Paxlovid, 300/100,) tablet therapy pack Take 3 tablets by mouth 2 (two) times a day for 5 days Take 2 nirmatrelvir tablets + 1 ritonavir tablet together per dose, Starting Fri 2/23/2024, Until Wed 2/28/2024, Normal      Omega-3 1000 MG CAPS Take by mouth daily, Historical Med      ondansetron (ZOFRAN) 8 mg tablet Take 1 tablet (8 mg total) by mouth every 8 (eight) hours as needed for nausea or vomiting, Starting Wed 11/8/2023, Normal      pantoprazole (PROTONIX) 20 mg tablet Take 1 tablet (20 mg total) by mouth daily, Starting Tue 7/25/2023, Normal      pioglitazone (ACTOS) 30 mg tablet Take 1 tablet (30 mg total) by  mouth every evening, Starting Fri 2/23/2024, Until Wed 8/21/2024, Fill Later      tamsulosin (FLOMAX) 0.4 mg Take 1 capsule (0.4 mg total) by mouth daily with dinner, Starting Tue 8/8/2023, Normal                 PDMP Review         Value Time User    PDMP Reviewed  Yes 11/6/2023 12:20 PM Shine Rogers PA-C            ED Provider  Electronically Signed by             John Hernández MD  02/24/24 [id number]

## 2024-02-25 LAB
ATRIAL RATE: 77 BPM
P AXIS: 52 DEGREES
PR INTERVAL: 178 MS
QRS AXIS: 97 DEGREES
QRSD INTERVAL: 106 MS
QT INTERVAL: 418 MS
QTC INTERVAL: 473 MS
T WAVE AXIS: 39 DEGREES
VENTRICULAR RATE: 77 BPM

## 2024-02-25 PROCEDURE — 93010 ELECTROCARDIOGRAM REPORT: CPT | Performed by: INTERNAL MEDICINE

## 2024-02-26 ENCOUNTER — TELEPHONE (OUTPATIENT)
Dept: INTERNAL MEDICINE CLINIC | Facility: CLINIC | Age: 79
End: 2024-02-26

## 2024-02-29 ENCOUNTER — RA CDI HCC (OUTPATIENT)
Dept: OTHER | Facility: HOSPITAL | Age: 79
End: 2024-02-29

## 2024-02-29 LAB
BACTERIA BLD CULT: NORMAL
BACTERIA BLD CULT: NORMAL

## 2024-03-07 ENCOUNTER — RA CDI HCC (OUTPATIENT)
Dept: OTHER | Facility: HOSPITAL | Age: 79
End: 2024-03-07

## 2024-03-12 ENCOUNTER — LAB (OUTPATIENT)
Dept: LAB | Facility: CLINIC | Age: 79
End: 2024-03-12
Payer: COMMERCIAL

## 2024-03-12 ENCOUNTER — OFFICE VISIT (OUTPATIENT)
Dept: INTERNAL MEDICINE CLINIC | Facility: CLINIC | Age: 79
End: 2024-03-12
Payer: COMMERCIAL

## 2024-03-12 VITALS
OXYGEN SATURATION: 96 % | BODY MASS INDEX: 36.38 KG/M2 | DIASTOLIC BLOOD PRESSURE: 82 MMHG | TEMPERATURE: 97.6 F | WEIGHT: 245.6 LBS | HEIGHT: 69 IN | SYSTOLIC BLOOD PRESSURE: 136 MMHG | HEART RATE: 79 BPM

## 2024-03-12 DIAGNOSIS — E53.8 VITAMIN B 12 DEFICIENCY: ICD-10-CM

## 2024-03-12 DIAGNOSIS — I10 ESSENTIAL HYPERTENSION: ICD-10-CM

## 2024-03-12 DIAGNOSIS — E83.42 HYPOMAGNESEMIA: Primary | ICD-10-CM

## 2024-03-12 DIAGNOSIS — Z00.00 MEDICARE ANNUAL WELLNESS VISIT, SUBSEQUENT: ICD-10-CM

## 2024-03-12 DIAGNOSIS — R55 VASOVAGAL SYNCOPE: ICD-10-CM

## 2024-03-12 DIAGNOSIS — E83.42 HYPOMAGNESEMIA: ICD-10-CM

## 2024-03-12 DIAGNOSIS — E11.8 TYPE 2 DIABETES MELLITUS WITH COMPLICATION, WITHOUT LONG-TERM CURRENT USE OF INSULIN (HCC): ICD-10-CM

## 2024-03-12 LAB
MAGNESIUM SERPL-MCNC: 1.6 MG/DL (ref 1.9–2.7)
VIT B12 SERPL-MCNC: 236 PG/ML (ref 180–914)

## 2024-03-12 PROCEDURE — 99214 OFFICE O/P EST MOD 30 MIN: CPT | Performed by: INTERNAL MEDICINE

## 2024-03-12 PROCEDURE — 82607 VITAMIN B-12: CPT

## 2024-03-12 PROCEDURE — 83735 ASSAY OF MAGNESIUM: CPT

## 2024-03-12 PROCEDURE — G0439 PPPS, SUBSEQ VISIT: HCPCS | Performed by: INTERNAL MEDICINE

## 2024-03-12 PROCEDURE — 36415 COLL VENOUS BLD VENIPUNCTURE: CPT

## 2024-03-12 RX ORDER — METFORMIN HYDROCHLORIDE 500 MG/1
500 TABLET, EXTENDED RELEASE ORAL 2 TIMES DAILY WITH MEALS
Qty: 180 TABLET | Refills: 3 | Status: SHIPPED | OUTPATIENT
Start: 2024-03-12 | End: 2025-03-07

## 2024-03-12 NOTE — ASSESSMENT & PLAN NOTE
Lab Results   Component Value Date    HGBA1C 6.3 09/12/2023   Metformin  mg BID  Actos 30 mg Qday  Glimepiride 4 mg BID  [facility] noted A1c 6.5% 2/14/2024

## 2024-03-12 NOTE — PATIENT INSTRUCTIONS
Medicare Preventive Visit Patient Instructions  Thank you for completing your Welcome to Medicare Visit or Medicare Annual Wellness Visit today. Your next wellness visit will be due in one year (3/13/2025).  The screening/preventive services that you may require over the next 5-10 years are detailed below. Some tests may not apply to you based off risk factors and/or age. Screening tests ordered at today's visit but not completed yet may show as past due. Also, please note that scanned in results may not display below.  Preventive Screenings:  Service Recommendations Previous Testing/Comments   Colorectal Cancer Screening  Colonoscopy    Fecal Occult Blood Test (FOBT)/Fecal Immunochemical Test (FIT)  Fecal DNA/Cologuard Test  Flexible Sigmoidoscopy Age: 45-75 years old   Colonoscopy: every 10 years (May be performed more frequently if at higher risk)  OR  FOBT/FIT: every 1 year  OR  Cologuard: every 3 years  OR  Sigmoidoscopy: every 5 years  Screening may be recommended earlier than age 45 if at higher risk for colorectal cancer. Also, an individualized decision between you and your healthcare provider will decide whether screening between the ages of 76-85 would be appropriate. Colonoscopy: 08/27/2020  FOBT/FIT: Not on file  Cologuard: Not on file  Sigmoidoscopy: Not on file          Prostate Cancer Screening Individualized decision between patient and health care provider in men between ages of 55-69   Medicare will cover every 12 months beginning on the day after your 50th birthday PSA: 0.5 ng/mL     Screening Not Indicated     Hepatitis C Screening Once for adults born between 1945 and 1965  More frequently in patients at high risk for Hepatitis C Hep C Antibody: 09/11/2018    Screening Current   Diabetes Screening 1-2 times per year if you're at risk for diabetes or have pre-diabetes Fasting glucose: 120 mg/dL (5/5/2023)  A1C: 6.3 (9/12/2023)  Screening Not Indicated  History Diabetes   Cholesterol Screening  Once every 5 years if you don't have a lipid disorder. May order more often based on risk factors. Lipid panel: 05/05/2023  Screening Not Indicated  History Lipid Disorder      Other Preventive Screenings Covered by Medicare:  Abdominal Aortic Aneurysm (AAA) Screening: covered once if your at risk. You're considered to be at risk if you have a family history of AAA or a male between the age of 65-75 who smoking at least 100 cigarettes in your lifetime.  Lung Cancer Screening: covers low dose CT scan once per year if you meet all of the following conditions: (1) Age 55-77; (2) No signs or symptoms of lung cancer; (3) Current smoker or have quit smoking within the last 15 years; (4) You have a tobacco smoking history of at least 20 pack years (packs per day x number of years you smoked); (5) You get a written order from a healthcare provider.  Glaucoma Screening: covered annually if you're considered high risk: (1) You have diabetes OR (2) Family history of glaucoma OR (3)  aged 50 and older OR (4)  American aged 65 and older  Osteoporosis Screening: covered every 2 years if you meet one of the following conditions: (1) Have a vertebral abnormality; (2) On glucocorticoid therapy for more than 3 months; (3) Have primary hyperparathyroidism; (4) On osteoporosis medications and need to assess response to drug therapy.  HIV Screening: covered annually if you're between the age of 15-65. Also covered annually if you are younger than 15 and older than 65 with risk factors for HIV infection. For pregnant patients, it is covered up to 3 times per pregnancy.    Immunizations:  Immunization Recommendations   Influenza Vaccine Annual influenza vaccination during flu season is recommended for all persons aged >= 6 months who do not have contraindications   Pneumococcal Vaccine   * Pneumococcal conjugate vaccine = PCV13 (Prevnar 13), PCV15 (Vaxneuvance), PCV20 (Prevnar 20)  * Pneumococcal polysaccharide  vaccine = PPSV23 (Pneumovax) Adults 19-65 yo with certain risk factors or if 65+ yo  If never received any pneumonia vaccine: recommend Prevnar 20 (PCV20)  Give PCV20 if previously received 1 dose of PCV13 or PPSV23   Hepatitis B Vaccine 3 dose series if at intermediate or high risk (ex: diabetes, end stage renal disease, liver disease)   Respiratory syncytial virus (RSV) Vaccine - COVERED BY MEDICARE PART D  * RSVPreF3 (Arexvy) CDC recommends that adults 60 years of age and older may receive a single dose of RSV vaccine using shared clinical decision-making (SCDM)   Tetanus (Td) Vaccine - COST NOT COVERED BY MEDICARE PART B Following completion of primary series, a booster dose should be given every 10 years to maintain immunity against tetanus. Td may also be given as tetanus wound prophylaxis.   Tdap Vaccine - COST NOT COVERED BY MEDICARE PART B Recommended at least once for all adults. For pregnant patients, recommended with each pregnancy.   Shingles Vaccine (Shingrix) - COST NOT COVERED BY MEDICARE PART B  2 shot series recommended in those 19 years and older who have or will have weakened immune systems or those 50 years and older     Health Maintenance Due:      Topic Date Due   • Hepatitis C Screening  Completed   • Colorectal Cancer Screening  Discontinued     Immunizations Due:      Topic Date Due   • COVID-19 Vaccine (8 - 2023-24 season) 11/24/2023     Advance Directives   What are advance directives?  Advance directives are legal documents that state your wishes and plans for medical care. These plans are made ahead of time in case you lose your ability to make decisions for yourself. Advance directives can apply to any medical decision, such as the treatments you want, and if you want to donate organs.   What are the types of advance directives?  There are many types of advance directives, and each state has rules about how to use them. You may choose a combination of any of the following:  Living  will:  This is a written record of the treatment you want. You can also choose which treatments you do not want, which to limit, and which to stop at a certain time. This includes surgery, medicine, IV fluid, and tube feedings.   Durable power of  for healthcare (DPAHC):  This is a written record that states who you want to make healthcare choices for you when you are unable to make them for yourself. This person, called a proxy, is usually a family member or a friend. You may choose more than 1 proxy.  Do not resuscitate (DNR) order:  A DNR order is used in case your heart stops beating or you stop breathing. It is a request not to have certain forms of treatment, such as CPR. A DNR order may be included in other types of advance directives.  Medical directive:  This covers the care that you want if you are in a coma, near death, or unable to make decisions for yourself. You can list the treatments you want for each condition. Treatment may include pain medicine, surgery, blood transfusions, dialysis, IV or tube feedings, and a ventilator (breathing machine).  Values history:  This document has questions about your views, beliefs, and how you feel and think about life. This information can help others choose the care that you would choose.  Why are advance directives important?  An advance directive helps you control your care. Although spoken wishes may be used, it is better to have your wishes written down. Spoken wishes can be misunderstood, or not followed. Treatments may be given even if you do not want them. An advance directive may make it easier for your family to make difficult choices about your care.   Weight Management   Why it is important to manage your weight:  Being overweight increases your risk of health conditions such as heart disease, high blood pressure, type 2 diabetes, and certain types of cancer. It can also increase your risk for osteoarthritis, sleep apnea, and other respiratory  problems. Aim for a slow, steady weight loss. Even a small amount of weight loss can lower your risk of health problems.  How to lose weight safely:  A safe and healthy way to lose weight is to eat fewer calories and get regular exercise. You can lose up about 1 pound a week by decreasing the number of calories you eat by 500 calories each day.   Healthy meal plan for weight management:  A healthy meal plan includes a variety of foods, contains fewer calories, and helps you stay healthy. A healthy meal plan includes the following:  Eat whole-grain foods more often.  A healthy meal plan should contain fiber. Fiber is the part of grains, fruits, and vegetables that is not broken down by your body. Whole-grain foods are healthy and provide extra fiber in your diet. Some examples of whole-grain foods are whole-wheat breads and pastas, oatmeal, brown rice, and bulgur.  Eat a variety of vegetables every day.  Include dark, leafy greens such as spinach, kale, otilio greens, and mustard greens. Eat yellow and orange vegetables such as carrots, sweet potatoes, and winter squash.   Eat a variety of fruits every day.  Choose fresh or canned fruit (canned in its own juice or light syrup) instead of juice. Fruit juice has very little or no fiber.  Eat low-fat dairy foods.  Drink fat-free (skim) milk or 1% milk. Eat fat-free yogurt and low-fat cottage cheese. Try low-fat cheeses such as mozzarella and other reduced-fat cheeses.  Choose meat and other protein foods that are low in fat.  Choose beans or other legumes such as split peas or lentils. Choose fish, skinless poultry (chicken or turkey), or lean cuts of red meat (beef or pork). Before you cook meat or poultry, cut off any visible fat.   Use less fat and oil.  Try baking foods instead of frying them. Add less fat, such as margarine, sour cream, regular salad dressing and mayonnaise to foods. Eat fewer high-fat foods. Some examples of high-fat foods include french fries,  doughnuts, ice cream, and cakes.  Eat fewer sweets.  Limit foods and drinks that are high in sugar. This includes candy, cookies, regular soda, and sweetened drinks.  Exercise:  Exercise at least 30 minutes per day on most days of the week. Some examples of exercise include walking, biking, dancing, and swimming. You can also fit in more physical activity by taking the stairs instead of the elevator or parking farther away from stores. Ask your healthcare provider about the best exercise plan for you.      © Copyright [employer] 2018 Information is for End User's use only and may not be sold, redistributed or otherwise used for commercial purposes. All illustrations and images included in CareNotes® are the copyrighted property of [employer] or [employer]

## 2024-03-12 NOTE — PROGRESS NOTES
Assessment and Plan:     Problem List Items Addressed This Visit     Type 2 diabetes mellitus with complication, without long-term current use of insulin ([facility])       Lab Results   Component Value Date    HGBA1C 6.3 09/12/2023   Metformin  mg BID  Actos 30 mg Qday  Glimepiride 4 mg BID  [facility] noted A1c 6.5% 2/14/2024         Relevant Medications    metFORMIN (GLUCOPHAGE-XR) 500 mg 24 hr tablet    Other Relevant Orders    Albumin / creatinine urine ratio    Comprehensive metabolic panel    Hemoglobin A1C    CBC and differential    Essential hypertension     Mild elevation of the BP  Continue the Lisinopril 40 mg Qday  Follow up on the Micro-albumin and BUN/Creatinine        Other Visit Diagnoses     Hypomagnesemia    -  Primary    Check Mg+ level    Relevant Orders    Magnesium    Medicare annual wellness visit, subsequent        Vitamin B 12 deficiency        B12 is noted to be good    Relevant Orders    Vitamin B12    Vasovagal syncope        [facility]           Preventive health issues were discussed with patient, and age appropriate screening tests were ordered as noted in patient's After Visit Summary.  Personalized health advice and appropriate referrals for health education or preventive services given if needed, as noted in patient's After Visit Summary.     History of Present Illness:     Patient presents for a Medicare Wellness Visit    The patient states that he had an episode of syncope at home when he started the Paxlovid at the end of February.  The patient was noted to have a mild elevation of lactic acid, low Mg+ and low B12.  He was discharged to home.  COVID symptoms were mild and resolved spontaneously.  The patient has done well since and his glucose has been good.  No other concerns at this time.       Patient Care Team:  Villa Mari DO as PCP - General (Internal Medicine)  Villa Mari DO as PCP - PCP-[facility] ([facility])  Villa Mari DO as PCP - PCP-shaneka  (RTE)     Review of Systems:     Review of Systems   Constitutional:  Negative for chills, fatigue and fever.   HENT: Negative.     Respiratory:  Negative for cough, chest tightness and shortness of breath.    Cardiovascular:  Negative for chest pain and palpitations.   Gastrointestinal:  Negative for abdominal pain, constipation, diarrhea, nausea and vomiting.   Genitourinary: Negative.    Musculoskeletal:  Negative for back pain and myalgias.   Skin: Negative.    Neurological: Negative.    Psychiatric/Behavioral:  Negative for dysphoric mood. The patient is not nervous/anxious.         Problem List:     Patient Active Problem List   Diagnosis   • Type 2 diabetes mellitus with complication, without long-term current use of insulin (HCC)   • Essential hypertension   • Allergic rhinitis   • Arthritis of knee, right   • Enlarged prostate with lower urinary tract symptoms (LUTS)   • Gastroesophageal reflux disease without esophagitis   • Fungal dermatosis   • Hypercholesterolemia   • Obesity (BMI 30.0-34.9)   • Peyronie's disease   • Encounter for hepatitis C screening test for low risk patient   • Polycythemia   • Colon polyps   • H. pylori infection   • Plantar fasciitis   • Obesity, morbid (HCC)      Past Medical and Surgical History:     Past Medical History:   Diagnosis Date   • Arthritis of knee, right     LAST ASSESSED: 2/7/17   • Diabetes mellitus (HCC)    • Does use hearing aid     jesus   • Enlarged prostate    • GERD (gastroesophageal reflux disease)    • Headache     occ   • History of gastroesophageal reflux (GERD)     LAST ASSESSED: 5/9/17   • Hypercholesterolemia     LAST ASSESSED: AND RESOLVED: 1/9/18   • Morbid (severe) obesity due to excess calories (HCC)     LAST ASSESSED: 5/10/16   • Pes anserine bursitis     LAST ASSESSED: 11/29/16   • Testicular hypogonadism     LAST ASSESSED: 10/30/13   • Tick bite     10/27/23 on abdomen   • Wears dentures     full uppers     Past Surgical History:   Procedure  Laterality Date   • COLONOSCOPY     • ELBOW SURGERY Right    • [place] NEUROPLASTY &/TRANSPOS MEDIAN NRV CARPAL TUNNE Left 2023    Procedure: RELEASE CARPAL TUNNEL;  Surgeon: Barry Lang DO;  Location: [facility];  Service: Orthopedics   • UPPER GASTROINTESTINAL ENDOSCOPY     • WRIST FUSION Right       Family History:     Family History   Problem Relation Age of Onset   • Diabetes type II Mother         MELLITUS   • Rheumatic fever Mother    • Heart disease Mother    • No Known Problems Father       Social History:     Social History     Socioeconomic History   • Marital status: /Civil Union     Spouse name: None   • Number of children: None   • Years of education: None   • Highest education level: None   Occupational History   • Occupation: RETIRED   Tobacco Use   • Smoking status: Former     Current packs/day: 0.00     Average packs/day: 1 pack/day for 20.0 years (20.0 ttl pk-yrs)     Types: Cigarettes     Start date: 3/10/1966     Quit date: 3/10/1986     Years since quittin.0     Passive exposure: Past   • Smokeless tobacco: Never   Vaping Use   • Vaping status: Never Used   Substance and Sexual Activity   • Alcohol use: Not Currently   • Drug use: No   • Sexual activity: Not Currently   Other Topics Concern   • None   Social History Narrative    Retired        Lives with spouse      Social Determinants of Health     Financial Resource Strain: Low Risk  (3/11/2024)    Overall Financial Resource Strain (CARDIA)    • Difficulty of Paying Living Expenses: Not hard at all   Food Insecurity: No Food Insecurity (3/12/2024)    Hunger Vital Sign    • Worried About Running Out of Food in the Last Year: Never true    • Ran Out of Food in the Last Year: Never true   Transportation Needs: No Transportation Needs (3/12/2024)    PRAPARE - Transportation    • Lack of Transportation (Medical): No    • Lack of Transportation (Non-Medical): No   Physical Activity: Not on file   Stress: Not on file    Social Connections: Not on file   Intimate Partner Violence: Not on file   Housing Stability: Low Risk  (3/12/2024)    Housing Stability Vital Sign    • Unable to Pay for Housing in the Last Year: No    • Number of Places Lived in the Last Year: 1    • Unstable Housing in the Last Year: No      Medications and Allergies:     Current Outpatient Medications   Medication Sig Dispense Refill   • aspirin 81 MG tablet Take 81 mg by mouth daily     • Coenzyme Q10 10 MG capsule Take 10 mg by mouth daily     • fluticasone (FLONASE) 50 mcg/act nasal spray 1 spray into each nostril daily as needed for rhinitis (viral infection) 16 mL 5   • glimepiride (AMARYL) 4 mg tablet Take 1 tablet (4 mg total) by mouth 2 (two) times a day 180 tablet 3   • glucose blood (OneTouch Ultra) test strip USE TO TEST BLOOD SUGAR DAILY 200 each 1   • lisinopril (ZESTRIL) 40 mg tablet Take 1 tablet (40 mg total) by mouth daily 90 tablet 1   • metFORMIN (GLUCOPHAGE-XR) 500 mg 24 hr tablet Take 1 tablet (500 mg total) by mouth 2 (two) times a day with meals 180 tablet 3   • Multiple Vitamin (multivitamin) tablet Take 1 tablet by mouth daily     • Omega-3 1000 MG CAPS Take by mouth daily     • pantoprazole (PROTONIX) 20 mg tablet Take 1 tablet (20 mg total) by mouth daily 90 tablet 1   • pioglitazone (ACTOS) 30 mg tablet Take 1 tablet (30 mg total) by mouth every evening 90 tablet 1   • tamsulosin (FLOMAX) 0.4 mg Take 1 capsule (0.4 mg total) by mouth daily with dinner 90 capsule 3     No current facility-administered medications for this visit.     Allergies   Allergen Reactions   • Pollen Extract Allergic Rhinitis   • Tape [Medical Tape] Rash     BANDAID ADHESIVE      Immunizations:     Immunization History   Administered Date(s) Administered   • COVID-19 MODERNA VACC 0.25 ML IM BOOSTER 11/29/2021   • COVID-19 MODERNA VACC 0.5 ML IM 02/08/2021, 03/08/2021, 11/29/2021, 04/07/2022   • COVID-19 Moderna Vac BIVALENT 12 Yr+ IM 0.5 ML 09/26/2022   •  COVID-19 Moderna mRNA Vaccine 12 Yr+ 50 mcg/0.5 mL (Spikevax) 09/29/2023   • INFLUENZA 1945, 01/08/2003, 03/01/2003, 09/23/2003, 03/02/2006, 10/23/2007, 10/27/2008, 11/25/2009, 10/01/2010, 09/26/2011, 10/31/2012, 10/22/2013, 10/03/2014, 10/01/2017, 10/01/2018, 10/02/2019, 10/06/2020   • Influenza Quadrivalent Preservative Free 3 years and older IM 09/25/2018, 10/05/2019, 10/06/2020   • Influenza Split High Dose Preservative Free IM 10/03/2015   • Influenza, Seasonal Vaccine, Quadrivalent, Adjuvanted, .5e 09/25/2021, 09/28/2022   • Influenza, high dose seasonal 0.7 mL 10/02/2019, 09/23/2023   • Influenza, injectable, quadrivalent, preservative free 0.5 mL 09/25/2018, 10/05/2019, 10/06/2020   • Influenza, seasonal, injectable 10/01/2017   • Influenza, seasonal, injectable, preservative free 10/01/2014, 10/04/2016, 09/26/2017   • Pneumococcal 03/01/2003, 01/15/2013   • Pneumococcal Conjugate 13-Valent 01/27/2016   • Pneumococcal Polysaccharide PPV23 01/15/2013   • Tdap 01/21/2015   • Zoster 01/15/2013   • Zoster Vaccine Recombinant 02/15/2022, 06/15/2022      Health Maintenance:         Topic Date Due   • Hepatitis C Screening  Completed   • Colorectal Cancer Screening  Discontinued         Topic Date Due   • COVID-19 Vaccine (8 - 2023-24 season) 11/24/2023      Medicare Screening Tests and Risk Assessments:     García is here for his Subsequent Wellness visit. Last Medicare Wellness visit information reviewed, patient interviewed and updates made to the record today.      Health Risk Assessment:   Patient rates overall health as good. Patient feels that their physical health rating is same. Patient is very satisfied with their life. Eyesight was rated as same. Hearing was rated as same. Patient feels that their emotional and mental health rating is same. Patients states they are never, rarely angry. Patient states they are never, rarely unusually tired/fatigued. Pain experienced in the last 7 days has been none.  Patient states that he has experienced no weight loss or gain in last 6 months.     Depression Screening:   PHQ-2 Score: 0      Fall Risk Screening:   In the past year, patient has experienced: no history of falling in past year      Home Safety:  Patient does not have trouble with stairs inside or outside of their home. Patient has working smoke alarms and has working carbon monoxide detector. Home safety hazards include: none.     Nutrition:   Current diet is Regular.     Medications:   Patient is not currently taking any over-the-counter supplements. Patient is able to manage medications.     Activities of Daily Living (ADLs)/Instrumental Activities of Daily Living (IADLs):   Walk and transfer into and out of bed and chair?: Yes  Dress and groom yourself?: Yes    Bathe or shower yourself?: Yes    Feed yourself? Yes  Do your laundry/housekeeping?: Yes  Manage your money, pay your bills and track your expenses?: Yes  Make your own meals?: Yes    Do your own shopping?: Yes    Previous Hospitalizations:   Any hospitalizations or ED visits within the last 12 months?: Yes    How many hospitalizations have you had in the last year?: 1-2    Hospitalization Comments: Fainted due to covid    Advance Care Planning:   Living will: Yes    Durable POA for healthcare: Yes    Advanced directive: Yes    Advanced directive counseling given: No    Five wishes given: No    Patient declined ACP directive: No    End of Life Decisions reviewed with patient: No    Provider agrees with end of life decisions: Yes      Cognitive Screening:   Provider or family/friend/caregiver concerned regarding cognition?: No    PREVENTIVE SCREENINGS      Cardiovascular Screening:    General: Screening Not Indicated and History Lipid Disorder      Diabetes Screening:     General: Screening Not Indicated and History Diabetes      Colorectal Cancer Screening:     General: Screening Not Indicated      Prostate Cancer Screening:    General: Screening Not  "Indicated      Osteoporosis Screening:    General: Screening Not Indicated      Abdominal Aortic Aneurysm (AAA) Screening:    Risk factors include: tobacco use        General: Screening Not Indicated      Lung Cancer Screening:     General: Screening Not Indicated      Hepatitis C Screening:    General: Screening Current    Screening, Brief Intervention, and Referral to Treatment (SBIRT)    Screening  Typical number of drinks in a day: 0  Typical number of drinks in a week: 0  Interpretation: Low risk drinking behavior.    Single Item Drug Screening:  How often have you used an illegal drug (including marijuana) or a prescription medication for non-medical reasons in the past year? never    Single Item Drug Screen Score: 0  Interpretation: Negative screen for possible drug use disorder    No results found.     Physical Exam:     /82   Pulse 79   Temp 97.6 °F (36.4 °C)   Ht 5' 9\" (1.753 m)   Wt 111 kg (245 lb 9.6 oz)   SpO2 96%   BMI 36.27 kg/m²     Physical Exam  Vitals and nursing note reviewed.   Constitutional:       General: He is not in acute distress.     Appearance: He is well-developed.   HENT:      Head: Normocephalic and atraumatic.   Eyes:      Conjunctiva/sclera: Conjunctivae normal.   Cardiovascular:      Rate and Rhythm: Normal rate and regular rhythm.      Heart sounds: No murmur heard.  Pulmonary:      Effort: Pulmonary effort is normal. No respiratory distress.      Breath sounds: Normal breath sounds.   Abdominal:      Palpations: Abdomen is soft.      Tenderness: There is no abdominal tenderness.   Musculoskeletal:         General: No swelling.      Cervical back: Neck supple.   Skin:     General: Skin is warm and dry.      Capillary Refill: Capillary refill takes less than 2 seconds.   Neurological:      Mental Status: He is alert.   Psychiatric:         Mood and Affect: Mood normal.          Villa GILLIAM'Neill, DO  "

## 2024-03-12 NOTE — ASSESSMENT & PLAN NOTE
Mild elevation of the BP  Continue the Lisinopril 40 mg Qday  Follow up on the Micro-albumin and BUN/Creatinine

## 2024-03-27 DIAGNOSIS — A04.8 H. PYLORI INFECTION: ICD-10-CM

## 2024-03-27 RX ORDER — PANTOPRAZOLE SODIUM 20 MG/1
20 TABLET, DELAYED RELEASE ORAL DAILY
Qty: 90 TABLET | Refills: 1 | Status: SHIPPED | OUTPATIENT
Start: 2024-03-27

## 2024-04-08 DIAGNOSIS — E11.8 TYPE 2 DIABETES MELLITUS WITH COMPLICATION, WITHOUT LONG-TERM CURRENT USE OF INSULIN (HCC): ICD-10-CM

## 2024-04-08 RX ORDER — METFORMIN HYDROCHLORIDE 500 MG/1
500 TABLET, EXTENDED RELEASE ORAL 2 TIMES DAILY WITH MEALS
Qty: 180 TABLET | Refills: 3 | Status: SHIPPED | OUTPATIENT
Start: 2024-04-08 | End: 2025-04-03

## 2024-04-15 ENCOUNTER — OFFICE VISIT (OUTPATIENT)
Dept: CARDIOLOGY CLINIC | Facility: CLINIC | Age: 79
End: 2024-04-15
Payer: COMMERCIAL

## 2024-04-15 VITALS
HEIGHT: 69 IN | DIASTOLIC BLOOD PRESSURE: 86 MMHG | SYSTOLIC BLOOD PRESSURE: 144 MMHG | HEART RATE: 74 BPM | BODY MASS INDEX: 36.73 KG/M2 | RESPIRATION RATE: 18 BRPM | WEIGHT: 248 LBS | OXYGEN SATURATION: 96 %

## 2024-04-15 DIAGNOSIS — R79.89 ELEVATED TROPONIN: ICD-10-CM

## 2024-04-15 DIAGNOSIS — R55 SYNCOPE, UNSPECIFIED SYNCOPE TYPE: Primary | ICD-10-CM

## 2024-04-15 DIAGNOSIS — I10 ESSENTIAL HYPERTENSION: ICD-10-CM

## 2024-04-15 DIAGNOSIS — E66.01 OBESITY, MORBID (HCC): ICD-10-CM

## 2024-04-15 PROCEDURE — 99204 OFFICE O/P NEW MOD 45 MIN: CPT | Performed by: INTERNAL MEDICINE

## 2024-04-15 NOTE — PROGRESS NOTES
Patient ID: García Varela is a 79 y.o. male.        Plan:      Syncope  I just want to make sure that there is no underlying cardiomyopathy.  An echocardiogram will be ordered.  If negative no further workup is needed cardiac wise unless there is recurrence.    Obesity, morbid (HCC)  We talked at length about cutting back on potatoes and bread.    Essential hypertension  Mainly reasonably controlled.  Emphasis on weight loss to help with this.     Follow up Plan/Other summary comments:  Assuming the echo is okay, follow-up here will be as needed.    HPI: Patient is seen today in consultation from the ED regarding a syncopal episode.  In February after he had COVID he had normal breakfast.  He stood up and soon after rising felt dizzy and fell to the floor.  EMS ultimately was called.  There was initially significant hypoxia.  CT of the chest did not reveal evidence for pulmonary embolism.  Symptoms dissipated and he was released.  Follow-up was suggested.  Since then he has returned to good health.  Yesterday he was splitting wood without any difficulty.  No prior syncope and none since.  There is diabetes and weight gain recently but there has been no chest pain or change in exertional capacity.            Most recent or relevant cardiac/vascular testing:    EKG from February was essentially normal.      Past Surgical History:   Procedure Laterality Date    COLONOSCOPY      ELBOW SURGERY Right     [place] NEUROPLASTY &/TRANSPOS MEDIAN NRV CARPAL TUNNE Left 11/6/2023    Procedure: RELEASE CARPAL TUNNEL;  Surgeon: Barry Lang DO;  Location: [facility];  Service: Orthopedics    UPPER GASTROINTESTINAL ENDOSCOPY      WRIST FUSION Right        Lipid Profile: Reviewed      Review of Systems   10  point ROS  was otherwise non pertinent or negative except as per HPI or as below.   Gait:  Normal.        Objective:     /86 (BP Location: Left arm, Patient Position: Sitting, Cuff Size: Large)   Pulse 74   Resp 18    "Ht 5' 8.5\" (1.74 m)   Wt 112 kg (248 lb)   SpO2 96%   BMI 37.16 kg/m²     PHYSICAL EXAM:    General:  Normal appearance in no distress.  Eyes:  Anicteric.  Oral mucosa:  Moist.  Neck:  No JVD. Carotid upstrokes are brisk without bruits.  No masses.  Chest:  Clear to auscultation.  Cardiac:  No palpable PMI.  Normal S1 and S2.  No murmur gallop or rub.  Abdomen:  Soft and nontender. No palpable organomegaly or aortic enlargement.  Extremities:  No peripheral edema.  Musculoskeletal:  Symmetric.   Vascular:  Femoral pulses are brisk without bruits.  Popliteal pulses are intact bilaterally.   Pedal pulses are intact.  Neuro:  Grossly symmetric.  Psych:  Alert and oriented x3.      Meds reviewed.    Past Medical History:   Diagnosis Date    Arthritis of knee, right     LAST ASSESSED: 17    Diabetes mellitus (HCC)     Does use hearing aid     jesus    Enlarged prostate     GERD (gastroesophageal reflux disease)     Headache     occ    History of gastroesophageal reflux (GERD)     LAST ASSESSED: 17    Hypertension     Morbid (severe) obesity due to excess calories (HCC)     LAST ASSESSED: 5/10/16    Pes anserine bursitis     LAST ASSESSED: 16    Testicular hypogonadism     LAST ASSESSED: 10/30/13    Tick bite     10/27/23 on abdomen    Wears dentures     full uppers           Social History     Tobacco Use   Smoking Status Former    Current packs/day: 0.00    Average packs/day: 1 pack/day for 20.0 years (20.0 ttl pk-yrs)    Types: Cigarettes    Start date: 3/10/1966    Quit date: 3/10/1986    Years since quittin.1    Passive exposure: Past   Smokeless Tobacco Never             "

## 2024-04-22 DIAGNOSIS — I10 ESSENTIAL HYPERTENSION: ICD-10-CM

## 2024-04-22 RX ORDER — LISINOPRIL 40 MG/1
40 TABLET ORAL DAILY
Qty: 90 TABLET | Refills: 1 | Status: SHIPPED | OUTPATIENT
Start: 2024-04-22

## 2024-05-06 ENCOUNTER — HOSPITAL ENCOUNTER (OUTPATIENT)
Dept: NON INVASIVE DIAGNOSTICS | Facility: CLINIC | Age: 79
Discharge: HOME | End: 2024-05-06
Payer: COMMERCIAL

## 2024-05-06 VITALS
SYSTOLIC BLOOD PRESSURE: 144 MMHG | DIASTOLIC BLOOD PRESSURE: 86 MMHG | HEIGHT: 69 IN | HEART RATE: 74 BPM | WEIGHT: 248 LBS | BODY MASS INDEX: 36.73 KG/M2

## 2024-05-06 DIAGNOSIS — R55 SYNCOPE, UNSPECIFIED SYNCOPE TYPE: ICD-10-CM

## 2024-05-06 LAB
AORTIC ROOT: 3.5 CM
AORTIC VALVE MEAN VELOCITY: 8.6 M/S
APICAL FOUR CHAMBER EJECTION FRACTION: 60 %
ASCENDING AORTA: 3.4 CM
AV LVOT MEAN GRADIENT: 3 MMHG
AV LVOT PEAK GRADIENT: 7 MMHG
AV MEAN GRADIENT: 3 MMHG
AV PEAK GRADIENT: 7 MMHG
AV VELOCITY RATIO: 0.96
BSA FOR ECHO PROCEDURE: 2.25 M2
DOP CALC AO PEAK VEL: 1.35 M/S
DOP CALC AO VTI: 29.36 CM
DOP CALC LVOT PEAK VEL VTI: 29.74 CM
DOP CALC LVOT PEAK VEL: 1.3 M/S
E WAVE DECELERATION TIME: 227 MS
E/A RATIO: 0.85
FRACTIONAL SHORTENING: 39 (ref 28–44)
INTERVENTRICULAR SEPTUM IN DIASTOLE (PARASTERNAL SHORT AXIS VIEW): 1.1 CM
INTERVENTRICULAR SEPTUM: 1.1 CM (ref 0.6–1.1)
LAAS-AP2: 25.1 CM2
LAAS-AP4: 16.5 CM2
LEFT ATRIUM SIZE: 3.8 CM
LEFT ATRIUM VOLUME (MOD BIPLANE): 60 ML
LEFT ATRIUM VOLUME INDEX (MOD BIPLANE): 26.7 ML/M2
LEFT INTERNAL DIMENSION IN SYSTOLE: 3 CM (ref 2.1–4)
LEFT VENTRICULAR INTERNAL DIMENSION IN DIASTOLE: 4.9 CM (ref 3.5–6)
LEFT VENTRICULAR POSTERIOR WALL IN END DIASTOLE: 1.1 CM
LEFT VENTRICULAR STROKE VOLUME: 78 ML
LVSV (TEICH): 78 ML
MV E'TISSUE VEL-SEP: 10 CM/S
MV PEAK A VEL: 0.85 M/S
MV PEAK E VEL: 72 CM/S
MV STENOSIS PRESSURE HALF TIME: 66 MS
MV VALVE AREA P 1/2 METHOD: 3.3
RIGHT VENTRICLE ID DIMENSION: 4.3 CM
SL CV LEFT ATRIUM LENGTH A2C: 6.1 CM
SL CV LV EF: 60
SL CV PED ECHO LEFT VENTRICLE DIASTOLIC VOLUME (MOD BIPLANE) 2D: 112 ML
SL CV PED ECHO LEFT VENTRICLE SYSTOLIC VOLUME (MOD BIPLANE) 2D: 34 ML
TR MAX PG: 25 MMHG
TR PEAK VELOCITY: 2.5 M/S
TRICUSPID ANNULAR PLANE SYSTOLIC EXCURSION: 2.7 CM
TRICUSPID VALVE PEAK REGURGITATION VELOCITY: 2.52 M/S

## 2024-05-06 PROCEDURE — 93306 TTE W/DOPPLER COMPLETE: CPT

## 2024-05-06 PROCEDURE — 93306 TTE W/DOPPLER COMPLETE: CPT | Performed by: INTERNAL MEDICINE

## 2024-05-20 ENCOUNTER — LAB (OUTPATIENT)
Dept: LAB | Facility: CLINIC | Age: 79
End: 2024-05-20
Payer: COMMERCIAL

## 2024-05-20 DIAGNOSIS — E11.8 TYPE 2 DIABETES MELLITUS WITH COMPLICATION, WITHOUT LONG-TERM CURRENT USE OF INSULIN (HCC): ICD-10-CM

## 2024-05-20 LAB
ALBUMIN SERPL BCP-MCNC: 4.3 G/DL (ref 3.5–5)
ALP SERPL-CCNC: 65 U/L (ref 34–104)
ALT SERPL W P-5'-P-CCNC: 19 U/L (ref 7–52)
ANION GAP SERPL CALCULATED.3IONS-SCNC: 10 MMOL/L (ref 4–13)
AST SERPL W P-5'-P-CCNC: 20 U/L (ref 13–39)
BASOPHILS # BLD AUTO: 0.03 THOUSANDS/ÂΜL (ref 0–0.1)
BASOPHILS NFR BLD AUTO: 1 % (ref 0–1)
BILIRUB SERPL-MCNC: 0.84 MG/DL (ref 0.2–1)
BUN SERPL-MCNC: 23 MG/DL (ref 5–25)
CALCIUM SERPL-MCNC: 10.3 MG/DL (ref 8.4–10.2)
CHLORIDE SERPL-SCNC: 100 MMOL/L (ref 96–108)
CO2 SERPL-SCNC: 25 MMOL/L (ref 21–32)
CREAT SERPL-MCNC: 1.1 MG/DL (ref 0.6–1.3)
CREAT UR-MCNC: 196.8 MG/DL
EOSINOPHIL # BLD AUTO: 0.13 THOUSAND/ÂΜL (ref 0–0.61)
EOSINOPHIL NFR BLD AUTO: 2 % (ref 0–6)
ERYTHROCYTE [DISTWIDTH] IN BLOOD BY AUTOMATED COUNT: 13.6 % (ref 11.6–15.1)
EST. AVERAGE GLUCOSE BLD GHB EST-MCNC: 137 MG/DL
GFR SERPL CREATININE-BSD FRML MDRD: 63 ML/MIN/1.73SQ M
GLUCOSE P FAST SERPL-MCNC: 125 MG/DL (ref 65–99)
HBA1C MFR BLD: 6.4 %
HCT VFR BLD AUTO: 46.7 % (ref 36.5–49.3)
HGB BLD-MCNC: 15.1 G/DL (ref 12–17)
IMM GRANULOCYTES # BLD AUTO: 0.01 THOUSAND/UL (ref 0–0.2)
IMM GRANULOCYTES NFR BLD AUTO: 0 % (ref 0–2)
LYMPHOCYTES # BLD AUTO: 2.02 THOUSANDS/ÂΜL (ref 0.6–4.47)
LYMPHOCYTES NFR BLD AUTO: 31 % (ref 14–44)
MCH RBC QN AUTO: 29.9 PG (ref 26.8–34.3)
MCHC RBC AUTO-ENTMCNC: 32.3 G/DL (ref 31.4–37.4)
MCV RBC AUTO: 93 FL (ref 82–98)
MICROALBUMIN UR-MCNC: 37.5 MG/L
MICROALBUMIN/CREAT 24H UR: 19 MG/G CREATININE (ref 0–30)
MONOCYTES # BLD AUTO: 0.46 THOUSAND/ÂΜL (ref 0.17–1.22)
MONOCYTES NFR BLD AUTO: 7 % (ref 4–12)
NEUTROPHILS # BLD AUTO: 3.98 THOUSANDS/ÂΜL (ref 1.85–7.62)
NEUTS SEG NFR BLD AUTO: 59 % (ref 43–75)
NRBC BLD AUTO-RTO: 0 /100 WBCS
PLATELET # BLD AUTO: 310 THOUSANDS/UL (ref 149–390)
PMV BLD AUTO: 11.9 FL (ref 8.9–12.7)
POTASSIUM SERPL-SCNC: 4.5 MMOL/L (ref 3.5–5.3)
PROT SERPL-MCNC: 7.8 G/DL (ref 6.4–8.4)
RBC # BLD AUTO: 5.05 MILLION/UL (ref 3.88–5.62)
SODIUM SERPL-SCNC: 135 MMOL/L (ref 135–147)
WBC # BLD AUTO: 6.63 THOUSAND/UL (ref 4.31–10.16)

## 2024-05-20 PROCEDURE — 36415 COLL VENOUS BLD VENIPUNCTURE: CPT

## 2024-05-20 PROCEDURE — 82570 ASSAY OF URINE CREATININE: CPT

## 2024-05-20 PROCEDURE — 83036 HEMOGLOBIN GLYCOSYLATED A1C: CPT

## 2024-05-20 PROCEDURE — 80053 COMPREHEN METABOLIC PANEL: CPT

## 2024-05-20 PROCEDURE — 85025 COMPLETE CBC W/AUTO DIFF WBC: CPT

## 2024-05-20 PROCEDURE — 82043 UR ALBUMIN QUANTITATIVE: CPT

## 2024-06-21 ENCOUNTER — HOSPITAL ENCOUNTER (EMERGENCY)
Facility: HOSPITAL | Age: 79
Discharge: HOME | End: 2024-06-21
Attending: EMERGENCY MEDICINE
Payer: COMMERCIAL

## 2024-06-21 ENCOUNTER — APPOINTMENT (EMERGENCY)
Dept: RADIOLOGY | Facility: HOSPITAL | Age: 79
End: 2024-06-21
Payer: COMMERCIAL

## 2024-06-21 ENCOUNTER — APPOINTMENT (EMERGENCY)
Dept: CT IMAGING | Facility: HOSPITAL | Age: 79
End: 2024-06-21
Payer: COMMERCIAL

## 2024-06-21 VITALS
HEART RATE: 62 BPM | RESPIRATION RATE: 20 BRPM | DIASTOLIC BLOOD PRESSURE: 81 MMHG | SYSTOLIC BLOOD PRESSURE: 180 MMHG | OXYGEN SATURATION: 92 % | TEMPERATURE: 98.6 F

## 2024-06-21 DIAGNOSIS — K20.90 ESOPHAGITIS: Primary | ICD-10-CM

## 2024-06-21 LAB
ALBUMIN SERPL BCG-MCNC: 4.8 G/DL (ref 3.5–5)
ALP SERPL-CCNC: 72 U/L (ref 34–104)
ALT SERPL W P-5'-P-CCNC: 19 U/L (ref 7–52)
ANION GAP SERPL CALCULATED.3IONS-SCNC: 15 MMOL/L (ref 4–13)
APTT PPP: 24 SECONDS (ref 23–37)
AST SERPL W P-5'-P-CCNC: 16 U/L (ref 13–39)
ATRIAL RATE: 79 BPM
BASOPHILS # BLD AUTO: 0.03 THOUSANDS/ÂΜL (ref 0–0.1)
BASOPHILS NFR BLD AUTO: 0 % (ref 0–1)
BILIRUB SERPL-MCNC: 0.82 MG/DL (ref 0.2–1)
BILIRUB UR QL STRIP: NEGATIVE
BUN SERPL-MCNC: 27 MG/DL (ref 5–25)
CALCIUM SERPL-MCNC: 11.5 MG/DL (ref 8.4–10.2)
CARDIAC TROPONIN I PNL SERPL HS: 3 NG/L
CHLORIDE SERPL-SCNC: 100 MMOL/L (ref 96–108)
CLARITY UR: CLEAR
CO2 SERPL-SCNC: 24 MMOL/L (ref 21–32)
COLOR UR: ABNORMAL
CREAT SERPL-MCNC: 1.12 MG/DL (ref 0.6–1.3)
EOSINOPHIL # BLD AUTO: 0.05 THOUSAND/ÂΜL (ref 0–0.61)
EOSINOPHIL NFR BLD AUTO: 0 % (ref 0–6)
ERYTHROCYTE [DISTWIDTH] IN BLOOD BY AUTOMATED COUNT: 13.1 % (ref 11.6–15.1)
FLUAV RNA RESP QL NAA+PROBE: NEGATIVE
FLUBV RNA RESP QL NAA+PROBE: NEGATIVE
GFR SERPL CREATININE-BSD FRML MDRD: 62 ML/MIN/1.73SQ M
GLUCOSE SERPL-MCNC: 252 MG/DL (ref 65–140)
GLUCOSE UR STRIP-MCNC: ABNORMAL MG/DL
HCT VFR BLD AUTO: 48.9 % (ref 36.5–49.3)
HGB BLD-MCNC: 16.2 G/DL (ref 12–17)
HGB UR QL STRIP.AUTO: NEGATIVE
IMM GRANULOCYTES # BLD AUTO: 0.02 THOUSAND/UL (ref 0–0.2)
IMM GRANULOCYTES NFR BLD AUTO: 0 % (ref 0–2)
INR PPP: 1.04 (ref 0.84–1.19)
KETONES UR STRIP-MCNC: ABNORMAL MG/DL
LEUKOCYTE ESTERASE UR QL STRIP: NEGATIVE
LIPASE SERPL-CCNC: 55 U/L (ref 11–82)
LYMPHOCYTES # BLD AUTO: 2.21 THOUSANDS/ÂΜL (ref 0.6–4.47)
LYMPHOCYTES NFR BLD AUTO: 20 % (ref 14–44)
MCH RBC QN AUTO: 29.9 PG (ref 26.8–34.3)
MCHC RBC AUTO-ENTMCNC: 33.1 G/DL (ref 31.4–37.4)
MCV RBC AUTO: 90 FL (ref 82–98)
MONOCYTES # BLD AUTO: 0.47 THOUSAND/ÂΜL (ref 0.17–1.22)
MONOCYTES NFR BLD AUTO: 4 % (ref 4–12)
NEUTROPHILS # BLD AUTO: 8.57 THOUSANDS/ÂΜL (ref 1.85–7.62)
NEUTS SEG NFR BLD AUTO: 76 % (ref 43–75)
NITRITE UR QL STRIP: NEGATIVE
NRBC BLD AUTO-RTO: 0 /100 WBCS
P AXIS: 46 DEGREES
PH UR STRIP.AUTO: 7.5 [PH]
PLATELET # BLD AUTO: 306 THOUSANDS/UL (ref 149–390)
PMV BLD AUTO: 11.3 FL (ref 8.9–12.7)
POTASSIUM SERPL-SCNC: 3.8 MMOL/L (ref 3.5–5.3)
PR INTERVAL: 164 MS
PROT SERPL-MCNC: 8.4 G/DL (ref 6.4–8.4)
PROT UR STRIP-MCNC: NEGATIVE MG/DL
PROTHROMBIN TIME: 13.8 SECONDS (ref 11.6–14.5)
QRS AXIS: 79 DEGREES
QRSD INTERVAL: 104 MS
QT INTERVAL: 438 MS
QTC INTERVAL: 502 MS
RBC # BLD AUTO: 5.41 MILLION/UL (ref 3.88–5.62)
RSV RNA RESP QL NAA+PROBE: NEGATIVE
SARS-COV-2 RNA RESP QL NAA+PROBE: NEGATIVE
SODIUM SERPL-SCNC: 139 MMOL/L (ref 135–147)
SP GR UR STRIP.AUTO: 1.01
T WAVE AXIS: 43 DEGREES
UROBILINOGEN UR QL STRIP.AUTO: 0.2 E.U./DL
VENTRICULAR RATE: 79 BPM
WBC # BLD AUTO: 11.35 THOUSAND/UL (ref 4.31–10.16)

## 2024-06-21 PROCEDURE — 0241U HB NFCT DS VIR RESP RNA 4 TRGT: CPT | Performed by: EMERGENCY MEDICINE

## 2024-06-21 PROCEDURE — 74177 CT ABD & PELVIS W/CONTRAST: CPT

## 2024-06-21 PROCEDURE — 85730 THROMBOPLASTIN TIME PARTIAL: CPT | Performed by: EMERGENCY MEDICINE

## 2024-06-21 PROCEDURE — 81003 URINALYSIS AUTO W/O SCOPE: CPT | Performed by: EMERGENCY MEDICINE

## 2024-06-21 PROCEDURE — 93010 ELECTROCARDIOGRAM REPORT: CPT | Performed by: INTERNAL MEDICINE

## 2024-06-21 PROCEDURE — 83690 ASSAY OF LIPASE: CPT | Performed by: EMERGENCY MEDICINE

## 2024-06-21 PROCEDURE — 71045 X-RAY EXAM CHEST 1 VIEW: CPT

## 2024-06-21 PROCEDURE — 80053 COMPREHEN METABOLIC PANEL: CPT | Performed by: EMERGENCY MEDICINE

## 2024-06-21 PROCEDURE — 96374 THER/PROPH/DIAG INJ IV PUSH: CPT

## 2024-06-21 PROCEDURE — C9113 INJ PANTOPRAZOLE SODIUM, VIA: HCPCS | Performed by: EMERGENCY MEDICINE

## 2024-06-21 PROCEDURE — 99285 EMERGENCY DEPT VISIT HI MDM: CPT

## 2024-06-21 PROCEDURE — 93005 ELECTROCARDIOGRAM TRACING: CPT

## 2024-06-21 PROCEDURE — 96361 HYDRATE IV INFUSION ADD-ON: CPT

## 2024-06-21 PROCEDURE — 84484 ASSAY OF TROPONIN QUANT: CPT | Performed by: EMERGENCY MEDICINE

## 2024-06-21 PROCEDURE — 96375 TX/PRO/DX INJ NEW DRUG ADDON: CPT

## 2024-06-21 PROCEDURE — 36415 COLL VENOUS BLD VENIPUNCTURE: CPT | Performed by: EMERGENCY MEDICINE

## 2024-06-21 PROCEDURE — 85025 COMPLETE CBC W/AUTO DIFF WBC: CPT | Performed by: EMERGENCY MEDICINE

## 2024-06-21 PROCEDURE — 85610 PROTHROMBIN TIME: CPT | Performed by: EMERGENCY MEDICINE

## 2024-06-21 PROCEDURE — 99285 EMERGENCY DEPT VISIT HI MDM: CPT | Performed by: EMERGENCY MEDICINE

## 2024-06-21 RX ORDER — SODIUM CHLORIDE 9 MG/ML
150 INJECTION, SOLUTION INTRAVENOUS CONTINUOUS
Status: DISCONTINUED | OUTPATIENT
Start: 2024-06-21 | End: 2024-06-21 | Stop reason: HOSPADM

## 2024-06-21 RX ORDER — METOCLOPRAMIDE HYDROCHLORIDE 5 MG/ML
10 INJECTION INTRAMUSCULAR; INTRAVENOUS ONCE
Status: COMPLETED | OUTPATIENT
Start: 2024-06-21 | End: 2024-06-21

## 2024-06-21 RX ORDER — METOCLOPRAMIDE 10 MG/1
10 TABLET ORAL EVERY 6 HOURS
Qty: 10 TABLET | Refills: 0 | Status: SHIPPED | OUTPATIENT
Start: 2024-06-21 | End: 2024-06-24

## 2024-06-21 RX ORDER — SUCRALFATE 1 G/1
1 TABLET ORAL ONCE
Status: COMPLETED | OUTPATIENT
Start: 2024-06-21 | End: 2024-06-21

## 2024-06-21 RX ORDER — ONDANSETRON 2 MG/ML
1 INJECTION INTRAMUSCULAR; INTRAVENOUS ONCE
Status: COMPLETED | OUTPATIENT
Start: 2024-06-21 | End: 2024-06-21

## 2024-06-21 RX ORDER — SUCRALFATE 1 G/1
1 TABLET ORAL 4 TIMES DAILY
Qty: 40 TABLET | Refills: 0 | Status: SHIPPED | OUTPATIENT
Start: 2024-06-21

## 2024-06-21 RX ORDER — ATROPINE SULFATE 0.1 MG/ML
1 SYRINGE (ML) INJECTION ONCE
Status: COMPLETED | OUTPATIENT
Start: 2024-06-21 | End: 2024-06-21

## 2024-06-21 RX ORDER — PANTOPRAZOLE SODIUM 40 MG/10ML
40 INJECTION, POWDER, LYOPHILIZED, FOR SOLUTION INTRAVENOUS ONCE
Status: COMPLETED | OUTPATIENT
Start: 2024-06-21 | End: 2024-06-21

## 2024-06-21 RX ADMIN — PANTOPRAZOLE SODIUM 40 MG: 40 INJECTION, POWDER, FOR SOLUTION INTRAVENOUS at 01:03

## 2024-06-21 RX ADMIN — IOHEXOL 100 ML: 350 INJECTION, SOLUTION INTRAVENOUS at 00:47

## 2024-06-21 RX ADMIN — METOCLOPRAMIDE 10 MG: 5 INJECTION, SOLUTION INTRAMUSCULAR; INTRAVENOUS at 01:27

## 2024-06-21 RX ADMIN — SUCRALFATE 1 G: 1 TABLET ORAL at 01:27

## 2024-06-21 RX ADMIN — SODIUM CHLORIDE 150 ML/HR: 0.9 INJECTION, SOLUTION INTRAVENOUS at 00:16

## 2024-06-21 NOTE — ED PROVIDER NOTES
History  Chief Complaint   Patient presents with    Nausea    Weakness - Generalized     Patient presents with nausea (acid reflux) that started this morning along with generalized weakness. Reports that it has gotten worse throughout the day and is feeling short of breath as well.      79-year-old male presents emergency department complaining of feeling poorly.  The patient has diaphoresis bradycardia and had a low blood pressure.  911 was called, and the patient was found to have these issues, and notes he has been complaining of nausea most of the day.  The patient states he is belching frequently and had some relief with Tums and Pepto-Bismol.  When the patient was found, he was given atropine, one half a milligram as well as Zofran.  Upon improving the patient's heart rate from the high 40s to 70s, the patient became more nauseated and is having dry heaves.  The patient had a second dose of 0.5 mg of atropine while in route with a rebound of the patient's heart rate to the mid 70s to 80s.  Patient has no pain in his chest and admits to mild shortness of breath.  Patient arrives belching, stating he feels poorly.  Notes he has been sickly ever since agus COVID earlier this year.  Patient notes he feels better than he did when he first called 911.  Patient has mild discomfort in the epigastrium.        Prior to Admission Medications   Prescriptions Last Dose Informant Patient Reported? Taking?   Coenzyme Q10 10 MG capsule   Yes No   Sig: Take 10 mg by mouth daily   Magnesium Chloride (MAGNESIUM DR PO)   Yes No   Sig: Take by mouth   Multiple Vitamin (multivitamin) tablet   Yes No   Sig: Take 1 tablet by mouth daily   Omega-3 1000 MG CAPS   Yes No   Sig: Take by mouth daily   cholecalciferol 1,000 units tablet   Yes No   Sig: Take 1,000 Units by mouth daily   fluticasone (FLONASE) 50 mcg/act nasal spray   No No   Si spray into each nostril daily as needed for rhinitis (viral infection)   glimepiride  (AMARYL) 4 mg tablet   No No   Sig: Take 1 tablet (4 mg total) by mouth 2 (two) times a day   glucose blood (OneTouch Ultra) test strip   No No   Sig: USE TO TEST BLOOD SUGAR DAILY   lisinopril (ZESTRIL) 40 mg tablet   No No   Sig: Take 1 tablet  by mouth daily   metFORMIN (GLUCOPHAGE-XR) 500 mg 24 hr tablet   No No   Sig: Take 1 tablet (500 mg total) by mouth 2 (two) times a day with meals   pantoprazole (PROTONIX) 20 mg tablet   No No   Sig: Take 1 tablet (20 mg total) by mouth daily   pioglitazone (ACTOS) 30 mg tablet   No No   Sig: Take 1 tablet (30 mg total) by mouth every evening   tamsulosin (FLOMAX) 0.4 mg   No No   Sig: Take 1 capsule (0.4 mg total) by mouth daily with dinner      Facility-Administered Medications: None       Past Medical History:   Diagnosis Date    Arthritis of knee, right     LAST ASSESSED: 2/7/17    Diabetes mellitus (HCC)     Does use hearing aid     jesus    Enlarged prostate     GERD (gastroesophageal reflux disease)     Headache     occ    History of gastroesophageal reflux (GERD)     LAST ASSESSED: 5/9/17    Hypertension     Morbid (severe) obesity due to excess calories (HCC)     LAST ASSESSED: 5/10/16    Pes anserine bursitis     LAST ASSESSED: 11/29/16    Testicular hypogonadism     LAST ASSESSED: 10/30/13    Tick bite     10/27/23 on abdomen    Wears dentures     full uppers       Past Surgical History:   Procedure Laterality Date    COLONOSCOPY      ELBOW SURGERY Right     CO NEUROPLASTY &/TRANSPOS MEDIAN NRV CARPAL TUNNE Left 11/6/2023    Procedure: RELEASE CARPAL TUNNEL;  Surgeon: Barry Lang DO;  Location: [facility];  Service: Orthopedics    UPPER GASTROINTESTINAL ENDOSCOPY      WRIST FUSION Right        Family History   Problem Relation Age of Onset    Diabetes type II Mother         MELLITUS    Rheumatic fever Mother     Heart disease Mother     No Known Problems Father      I have reviewed and agree with the history as documented.    E-Cigarette/Vaping     E-Cigarette Use Never User      E-Cigarette/Vaping Substances    Nicotine No     THC No     CBD No     Flavoring No     Other No     Unknown No      Social History     Tobacco Use    Smoking status: Former     Current packs/day: 0.00     Average packs/day: 1 pack/day for 20.0 years (20.0 ttl pk-yrs)     Types: Cigarettes     Start date: 3/10/1966     Quit date: 3/10/1986     Years since quittin.3     Passive exposure: Past    Smokeless tobacco: Never   Vaping Use    Vaping status: Never Used   Substance Use Topics    Alcohol use: Not Currently    Drug use: No       Review of Systems   Constitutional:  Positive for activity change and diaphoresis. Negative for chills and fever.   HENT:  Negative for ear pain and sore throat.    Eyes:  Negative for pain and visual disturbance.   Respiratory:  Positive for shortness of breath. Negative for cough.    Cardiovascular:  Negative for chest pain and palpitations.   Gastrointestinal:  Positive for nausea. Negative for abdominal pain and vomiting.   Genitourinary:  Negative for dysuria and hematuria.   Musculoskeletal:  Negative for arthralgias and back pain.   Skin:  Negative for color change and rash.   Neurological:  Positive for weakness. Negative for seizures and syncope.   All other systems reviewed and are negative.      Physical Exam  Physical Exam  Constitutional:       General: He is not in acute distress.     Appearance: Normal appearance. He is normal weight. He is ill-appearing. He is not toxic-appearing or diaphoretic.   HENT:      Head: Normocephalic and atraumatic.      Right Ear: External ear normal.      Left Ear: External ear normal.      Nose: Nose normal.      Mouth/Throat:      Mouth: Mucous membranes are moist.   Eyes:      Conjunctiva/sclera: Conjunctivae normal.   Cardiovascular:      Rate and Rhythm: Normal rate and regular rhythm.      Pulses: Normal pulses.      Heart sounds: Normal heart sounds.   Pulmonary:      Effort: Pulmonary effort is  normal.      Breath sounds: Normal breath sounds.   Abdominal:      General: Abdomen is flat. There is no distension.      Palpations: Abdomen is soft. There is no mass.      Tenderness: There is abdominal tenderness.      Comments: Mild discomfort to the epigastrium with palpation.  Doing so makes him belch.   Musculoskeletal:         General: No swelling, tenderness or deformity. Normal range of motion.      Cervical back: Normal range of motion.   Skin:     General: Skin is warm and dry.      Capillary Refill: Capillary refill takes 2 to 3 seconds.      Coloration: Skin is pale.   Neurological:      General: No focal deficit present.      Mental Status: He is alert and oriented to person, place, and time. Mental status is at baseline.      Sensory: No sensory deficit.      Motor: No weakness.      Coordination: Coordination normal.   Psychiatric:         Mood and Affect: Mood normal.         Vital Signs  ED Triage Vitals   Temperature Pulse Respirations Blood Pressure SpO2   06/21/24 0009 06/21/24 0009 06/21/24 0009 06/21/24 0009 06/21/24 0009   98.6 °F (37 °C) 78 (!) 26 (!) 188/95 99 %      Temp src Heart Rate Source Patient Position - Orthostatic VS BP Location FiO2 (%)   -- 06/21/24 0100 -- 06/21/24 0100 --    Monitor  Left arm       Pain Score       --                  Vitals:    06/21/24 0009 06/21/24 0100 06/21/24 0200   BP: (!) 188/95 167/84 (!) 180/81   Pulse: 78 66 62         Visual Acuity      ED Medications  Medications   atropine (FOR EMS ONLY) 1 mg/10 mL injection 1 mg (0 mg Does not apply Given to EMS 6/21/24 0014)   ondansetron (FOR EMS ONLY) (ZOFRAN) 4 mg/2 mL injection 4 mg (0 mg Does not apply Given to EMS 6/21/24 0014)   iohexol (OMNIPAQUE) 350 MG/ML injection (MULTI-DOSE) 100 mL (100 mL Intravenous Given 6/21/24 0047)   pantoprazole (PROTONIX) injection 40 mg (40 mg Intravenous Given 6/21/24 0103)   sucralfate (CARAFATE) tablet 1 g (1 g Oral Given 6/21/24 0127)   metoclopramide (REGLAN)  injection 10 mg (10 mg Intravenous Given 6/21/24 0127)       Diagnostic Studies  Results Reviewed       Procedure Component Value Units Date/Time    UA w Reflex to Microscopic w Reflex to Culture [[id number]]  (Abnormal) Collected: 06/21/24 0131    Lab Status: Final result Specimen: Urine, Clean Catch Updated: 06/21/24 0136     Color, UA Straw     Clarity, UA Clear     Specific Gravity, UA 1.015     pH, UA 7.5     Leukocytes, UA Negative     Nitrite, UA Negative     Protein, UA Negative mg/dl      Glucose, UA Trace mg/dl      Ketones, UA 15 (1+) mg/dl      Urobilinogen, UA 0.2 E.U./dl      Bilirubin, UA Negative     Occult Blood, UA Negative    FLU/RSV/COVID - if FLU/RSV clinically relevant [[id number]]  (Normal) Collected: 06/21/24 0011    Lab Status: Final result Specimen: Nares from Nose Updated: 06/21/24 0055     SARS-CoV-2 Negative     INFLUENZA A PCR Negative     INFLUENZA B PCR Negative     RSV PCR Negative    Narrative:      FOR PEDIATRIC PATIENTS - copy/paste COVID Guidelines URL to browser: [email or web address]    SARS-CoV-2 assay is a Nucleic Acid Amplification assay intended for the  qualitative detection of nucleic acid from SARS-CoV-2 in nasopharyngeal  swabs. Results are for the presumptive identification of SARS-CoV-2 RNA.    Positive results are indicative of infection with SARS-CoV-2, the virus  causing COVID-19, but do not rule out bacterial infection or co-infection  with other viruses. Laboratories within the [place] and its  territories are required to report all positive results to the appropriate  public health authorities. Negative results do not preclude SARS-CoV-2  infection and should not be used as the sole basis for treatment or other  patient management decisions. Negative results must be combined with  clinical observations, patient history, and epidemiological information.  This test has not been FDA cleared or  approved.    This test has been authorized by FDA under an Emergency Use Authorization  (EUA). This test is only authorized for the duration of time the  declaration that circumstances exist justifying the authorization of the  emergency use of an in vitro diagnostic tests for detection of SARS-CoV-2  virus and/or diagnosis of COVID-19 infection under section 564(b)(1) of  the Act, 21 U.S.C. 360bbb-3(b)(1), unless the authorization is terminated  or revoked sooner. The test has been validated but independent review by FDA  and CLIA is pending.    Test performed using [employer] GeneXpert: This RT-PCR assay targets N2,  a region unique to SARS-CoV-2. A conserved region in the E-gene was chosen  for pan-Sarbecovirus detection which includes SARS-CoV-2.    According to CMS-2020-01-R, this platform meets the definition of high-throughput technology.    HS Troponin 0hr (reflex protocol) [[id number]]  (Normal) Collected: 06/21/24 0011    Lab Status: Final result Specimen: Blood from Arm, Right Updated: 06/21/24 0041     hs TnI 0hr 3 ng/L     Lipase [[id number]]  (Normal) Collected: 06/21/24 0011    Lab Status: Final result Specimen: Blood from Arm, Right Updated: 06/21/24 0035     Lipase 55 u/L     Comprehensive metabolic panel [[id number]]  (Abnormal) Collected: 06/21/24 0011    Lab Status: Final result Specimen: Blood from Arm, Right Updated: 06/21/24 0035     Sodium 139 mmol/L      Potassium 3.8 mmol/L      Chloride 100 mmol/L      CO2 24 mmol/L      ANION GAP 15 mmol/L      BUN 27 mg/dL      Creatinine 1.12 mg/dL      Glucose 252 mg/dL      Calcium 11.5 mg/dL      AST 16 U/L      ALT 19 U/L      Alkaline Phosphatase 72 U/L      Total Protein 8.4 g/dL      Albumin 4.8 g/dL      Total Bilirubin 0.82 mg/dL      eGFR 62 ml/min/1.73sq m     Narrative:      National Kidney Disease Foundation guidelines for Chronic Kidney Disease (CKD):     Stage 1 with normal or high GFR (GFR > 90 mL/min/1.73 square meters)    Stage 2 Mild CKD  (GFR = 60-89 mL/min/1.73 square meters)    Stage 3A Moderate CKD (GFR = 45-59 mL/min/1.73 square meters)    Stage 3B Moderate CKD (GFR = 30-44 mL/min/1.73 square meters)    Stage 4 Severe CKD (GFR = 15-29 mL/min/1.73 square meters)    Stage 5 End Stage CKD (GFR <15 mL/min/1.73 square meters)  Note: GFR calculation is accurate only with a steady state creatinine    Protime-INR [[id number]]  (Normal) Collected: 06/21/24 0011    Lab Status: Final result Specimen: Blood from Arm, Right Updated: 06/21/24 0031     Protime 13.8 seconds      INR 1.04    APTT [[id number]]  (Normal) Collected: 06/21/24 0011    Lab Status: Final result Specimen: Blood from Arm, Right Updated: 06/21/24 0031     PTT 24 seconds     CBC and differential [[id number]]  (Abnormal) Collected: 06/21/24 0011    Lab Status: Final result Specimen: Blood from Arm, Right Updated: 06/21/24 0018     WBC 11.35 Thousand/uL      RBC 5.41 Million/uL      Hemoglobin 16.2 g/dL      Hematocrit 48.9 %      MCV 90 fL      MCH 29.9 pg      MCHC 33.1 g/dL      RDW 13.1 %      MPV 11.3 fL      Platelets 306 Thousands/uL      nRBC 0 /100 WBCs      Segmented % 76 %      Immature Grans % 0 %      Lymphocytes % 20 %      Monocytes % 4 %      Eosinophils Relative 0 %      Basophils Relative 0 %      Absolute Neutrophils 8.57 Thousands/µL      Absolute Immature Grans 0.02 Thousand/uL      Absolute Lymphocytes 2.21 Thousands/µL      Absolute Monocytes 0.47 Thousand/µL      Eosinophils Absolute 0.05 Thousand/µL      Basophils Absolute 0.03 Thousands/µL                    CT abdomen pelvis with contrast   Final Result by Darion Dietz MD (06/21 0114)         Findings suggestive of mild esophagitis at the gastroesophageal junction. Small hiatal hernia. No findings to indicate colitis, diverticulitis or bowel obstruction.         Workstation performed: [id number]         XR chest 1 view portable   ED Interpretation by Gustavo Botello Jr., DO (06/21 0101)   Mild interstitial  changes but no definitive acute pulmonary pathology.                 Procedures  ECG 12 Lead Documentation Only    Date/Time: 6/21/2024 12:10 AM    Performed by: Gustavo Botello Jr., DO  Authorized by: Gustavo Botello Jr., DO    ECG reviewed by me, the ED Provider: yes    Patient location:  ED  Comments:      EKG shows a sinus rhythm at 79 beats a minute.  There is a normal axis.  There is no definitive acute ST or T wave changes present.  There is mild baseline artifact appreciated.           ED Course  ED Course as of 06/21/24 0449   Fri Jun 21, 2024   0020 WBC(!): 11.35   0202 After GI medication in the ER, patient notes he is feeling much better.  EKG looks nonacute and troponin is 3.  CT scan is positive for esophagitis.  Symptoms are likely due to distal esophagitis and nausea secondary to it with vagal reaction causing decreased heart rate from the nausea.                               SBIRT 22yo+      Flowsheet Row Most Recent Value   Initial Alcohol Screen: US AUDIT-C     1. How often do you have a drink containing alcohol? 0 Filed at: 06/21/2024 0010   2. How many drinks containing alcohol do you have on a typical day you are drinking?  0 Filed at: 06/21/2024 0010   3a. Male UNDER 65: How often do you have five or more drinks on one occasion? 0 Filed at: 06/21/2024 0010   3b. FEMALE Any Age, or MALE 65+: How often do you have 4 or more drinks on one occassion? 0 Filed at: 06/21/2024 0010   Audit-C Score 0 Filed at: 06/21/2024 0010   MARSHALL: How many times in the past year have you...    Used an illegal drug or used a prescription medication for non-medical reasons? Never Filed at: 06/21/2024 0010                      Medical Decision Making  79-year-old male presents emergency department secondary to nausea as well as mild discomfort in the epigastrium which was been an ongoing issue for greater part of the day.  The patient denies any fever chills chest pain shortness of breath or syncope.  The  patient was sitting out on the stoop of his porch when EMS arrived.  The patient was diaphoretic and found to be bradycardic.  The patient is complaining of nausea.  Patient is having some dry heaves as well.  The patient was given half doses of atropine and route as well as Zofran.  The patient was evaluated by myself and found to be nauseated without chest pain.  The patient was slightly uncomfortable with palpation of the epigastrium and is belching frequently while in the ER.  Patient was given IV fluids as well as Carafate and Protonix IV.  Patient was also given Reglan with good relief of the patient's symptoms.  EKG looks nonacute and troponin is 3.  CT scan done of the abdomen pelvis, which was originally ordered to rule out bleed or possible bowel obstruction, was found to be positive for mild distal esophagitis.  This is where the patient has discomfort.  Other differential diagnoses considered was pancreatitis acute coronary syndrome or arrhythmia.  EMS tracings just show sinus bradycardia and route in the 50s.  Patient felt much better after medication in the ER, and being that the patient has had symptoms for a great majority of the day and having a negative troponin in the ER, I feel the patient is safe to be discharged with GI medication as well as referral to GI as outpatient.  Symptoms of nausea are likely because of vagal reaction for this patient creating bradycardia.  Since the patient has felt better patient's heart rate has been in the mid 60s to 70s.  Patient discharged    Amount and/or Complexity of Data Reviewed  Labs: ordered. Decision-making details documented in ED Course.  Radiology: ordered and independent interpretation performed.    Risk  Prescription drug management.             Disposition  Final diagnoses:   Esophagitis     Time reflects when diagnosis was documented in both MDM as applicable and the Disposition within this note       Time User Action Codes Description Comment     6/21/2024  1:55 AM Gustavo Botello [K20.90] Esophagitis           ED Disposition       ED Disposition   Discharge    Condition   Stable    Date/Time   Fri Jun 21, 2024 0155    Comment   García Varela discharge to home/self care.                   Follow-up Information    None         Discharge Medication List as of 6/21/2024  2:06 AM        START taking these medications    Details   metoclopramide (Reglan) 10 mg tablet Take 1 tablet (10 mg total) by mouth every 6 (six) hours for 10 doses, Starting Fri 6/21/2024, Until Mon 6/24/2024, Normal      sucralfate (CARAFATE) 1 g tablet Take 1 tablet (1 g total) by mouth 4 (four) times a day Take 1 tablet before meals and at bedtime.  Remember do not take any other medications for 30 to 45 minutes after taking the Carafate., Starting Fri 6/21/2024, Normal           CONTINUE these medications which have NOT CHANGED    Details   cholecalciferol 1,000 units tablet Take 1,000 Units by mouth daily, Historical Med      Coenzyme Q10 10 MG capsule Take 10 mg by mouth daily, Starting Tue 2/15/2022, Historical Med      fluticasone (FLONASE) 50 mcg/act nasal spray 1 spray into each nostril daily as needed for rhinitis (viral infection), Starting Fri 2/23/2024, Until Wed 8/21/2024 at 2359, Fill Later      glimepiride (AMARYL) 4 mg tablet Take 1 tablet (4 mg total) by mouth 2 (two) times a day, Starting Tue 9/12/2023, Until Tue 3/12/2024, Fill Later      glucose blood (OneTouch Ultra) test strip USE TO TEST BLOOD SUGAR DAILY, Normal      lisinopril (ZESTRIL) 40 mg tablet Take 1 tablet  by mouth daily, Starting Mon 4/22/2024, Normal      Magnesium Chloride (MAGNESIUM DR PO) Take by mouth, Historical Med      metFORMIN (GLUCOPHAGE-XR) 500 mg 24 hr tablet Take 1 tablet (500 mg total) by mouth 2 (two) times a day with meals, Starting Mon 4/8/2024, Until Thu 4/3/2025, Normal      Multiple Vitamin (multivitamin) tablet Take 1 tablet by mouth daily, Historical Med      Omega-3 1000 MG CAPS  Take by mouth daily, Historical Med      pantoprazole (PROTONIX) 20 mg tablet Take 1 tablet (20 mg total) by mouth daily, Starting Wed 3/27/2024, Normal      pioglitazone (ACTOS) 30 mg tablet Take 1 tablet (30 mg total) by mouth every evening, Starting Fri 2/23/2024, Until Wed 8/21/2024, Fill Later      tamsulosin (FLOMAX) 0.4 mg Take 1 capsule (0.4 mg total) by mouth daily with dinner, Starting Tue 8/8/2023, Normal                 PDMP Review         Value Time User    PDMP Reviewed  Yes 11/6/2023 12:20 PM Shine Rogers PA-C            ED Provider  Electronically Signed by             Gustavo Botello Jr.,   06/21/24 3249

## 2024-06-21 NOTE — DISCHARGE INSTRUCTIONS
Use a liquid diet for the next 48 hours.    For nausea use Reglan 1 pill every 6 hours as needed.    Continue your pantoprazole to decrease stomach acid.    Use Carafate before meals and at bedtime.  Remember, for 30 minutes after taking Carafate, do not take any other medicines as they may not absorb into your body.    Return to the ER for any new, concerning, or worsening issues.    I have contacted the scheduling team to set you up with an evaluation by a GI doctor.  A repeat scope may be necessary.

## 2024-07-01 ENCOUNTER — RA CDI HCC (OUTPATIENT)
Dept: OTHER | Facility: HOSPITAL | Age: 79
End: 2024-07-01

## 2024-07-05 ENCOUNTER — RA CDI HCC (OUTPATIENT)
Dept: OTHER | Facility: HOSPITAL | Age: 79
End: 2024-07-05

## 2024-07-12 ENCOUNTER — OFFICE VISIT (OUTPATIENT)
Dept: INTERNAL MEDICINE CLINIC | Facility: CLINIC | Age: 79
End: 2024-07-12
Payer: COMMERCIAL

## 2024-07-12 VITALS
TEMPERATURE: 97.4 F | SYSTOLIC BLOOD PRESSURE: 124 MMHG | OXYGEN SATURATION: 94 % | BODY MASS INDEX: 34.72 KG/M2 | HEIGHT: 69 IN | HEART RATE: 58 BPM | DIASTOLIC BLOOD PRESSURE: 80 MMHG | WEIGHT: 234.4 LBS

## 2024-07-12 DIAGNOSIS — K52.9 GASTROENTERITIS: ICD-10-CM

## 2024-07-12 DIAGNOSIS — I10 ESSENTIAL HYPERTENSION: Primary | ICD-10-CM

## 2024-07-12 DIAGNOSIS — E11.8 TYPE 2 DIABETES MELLITUS WITH COMPLICATION, WITHOUT LONG-TERM CURRENT USE OF INSULIN (HCC): ICD-10-CM

## 2024-07-12 DIAGNOSIS — E78.00 HYPERCHOLESTEROLEMIA: ICD-10-CM

## 2024-07-12 PROCEDURE — 99214 OFFICE O/P EST MOD 30 MIN: CPT | Performed by: INTERNAL MEDICINE

## 2024-07-12 PROCEDURE — G2211 COMPLEX E/M VISIT ADD ON: HCPCS | Performed by: INTERNAL MEDICINE

## 2024-07-12 RX ORDER — METFORMIN HYDROCHLORIDE 500 MG/1
1000 TABLET, EXTENDED RELEASE ORAL 2 TIMES DAILY WITH MEALS
Qty: 360 TABLET | Refills: 3 | Status: SHIPPED | OUTPATIENT
Start: 2024-07-12 | End: 2025-07-07

## 2024-07-12 NOTE — ASSESSMENT & PLAN NOTE
Lab Results   Component Value Date    HGBA1C 6.4 (H) 05/20/2024   The patient is noted to have a controlled A1c  Restart the Metformin XR 1000 mg BID  Follow up on the A1c

## 2024-07-12 NOTE — PROGRESS NOTES
"Ambulatory Visit  Name: García Varela      : 1945      MRN: [id number]  Encounter Provider: Villa Mari DO  Encounter Date: 2024   Encounter department: [facility]    Assessment & Plan   1. Essential hypertension  Assessment & Plan:  BP noted to be good and no concerns at this time  Continue he Lisinopril 40 mg Qday  BUN/Creatinine and Micro-albumin ordered  2. Type 2 diabetes mellitus with complication, without long-term current use of insulin (HCC)  Assessment & Plan:    Lab Results   Component Value Date    HGBA1C 6.4 (H) 2024   The patient is noted to have a controlled A1c  Restart the Metformin XR 1000 mg BID  Follow up on the A1c  Orders:  -     metFORMIN (GLUCOPHAGE-XR) 500 mg 24 hr tablet; Take 2 tablets (1,000 mg total) by mouth 2 (two) times a day with meals  -     Albumin / creatinine urine ratio; Future  -     Comprehensive metabolic panel; Future  -     Hemoglobin A1C; Future  -     Lipid Panel with Direct LDL reflex; Future  -     CBC and differential; Future  3. Hypercholesterolemia  Assessment & Plan:  Check the patient lipid  4. Gastroenteritis  Comments:  Reviewed ER note  Follow up with GI pending  continue Pantoprazole and Sulcrafate  Leukocytosis noted and patient declined follow CBC       History of Present Illness     HPI    Review of Systems    Objective     /80   Pulse 58   Temp (!) 97.4 °F (36.3 °C)   Ht 5' 8.5\" (1.74 m)   Wt 106 kg (234 lb 6.4 oz)   SpO2 94%   BMI 35.12 kg/m²     Physical Exam  Administrative Statements           "

## 2024-07-12 NOTE — ASSESSMENT & PLAN NOTE
BP noted to be good and no concerns at this time  Continue he Lisinopril 40 mg Qday  BUN/Creatinine and Micro-albumin ordered

## 2024-07-15 ENCOUNTER — OFFICE VISIT (OUTPATIENT)
Age: 79
End: 2024-07-15
Payer: COMMERCIAL

## 2024-07-15 VITALS
DIASTOLIC BLOOD PRESSURE: 74 MMHG | WEIGHT: 233.4 LBS | HEIGHT: 69 IN | TEMPERATURE: 97.6 F | BODY MASS INDEX: 34.57 KG/M2 | SYSTOLIC BLOOD PRESSURE: 122 MMHG | OXYGEN SATURATION: 96 % | HEART RATE: 69 BPM

## 2024-07-15 DIAGNOSIS — K21.9 GASTROESOPHAGEAL REFLUX DISEASE WITHOUT ESOPHAGITIS: ICD-10-CM

## 2024-07-15 DIAGNOSIS — K20.90 ESOPHAGITIS: Primary | ICD-10-CM

## 2024-07-15 PROCEDURE — 99203 OFFICE O/P NEW LOW 30 MIN: CPT | Performed by: NURSE PRACTITIONER

## 2024-07-15 NOTE — PROGRESS NOTES
[facility] - Outpatient Consultation  García Varela 79 y.o. male MRN: [id number]  Encounter: [id number]          ASSESSMENT AND PLAN:    The patient presents today for an initial consultation for his acute esophagitis with chronic GERD symptoms.     Exam:  Oral mucosa normal upon visual inspection, without any sores, lesions, or ulcerations. Sclera without icterus and benign. Lung sounds CTA b/l. Normal S1 & S2 upon exam. Abdomen is softly distended, nontender, with normal bowel sounds x 4. No edema noted of the b/l lower extremities upon exam today. Skin is non-icteric.     1. Esophagitis  -     Ambulatory Referral to Gastroenterology  2. Gastroesophageal reflux disease without esophagitis  Assessment & Plan:  Stable  While the patient was in the office today, I discussed with the patient that since he has only had the nausea and vomiting episode once and most likely could correlate with some kind of food poisoning or enteritis and since the imaging was only suggestive of esophagitis and he is not having any issues regarding swallowing or reflux symptoms and is already on Protonix, I see no reason to proceed with any endoscopic procedures at this time.  However, I did discuss with the patient that if his symptoms start to return or worsen, he should contact our office and we can discuss the possibility of an EGD in the future if necessary.  The patient was agreeable and verbalized an understanding.    Continue Protonix as prescribed.  Encouraged the patient to avoid acidic or trigger foods including alcohol as much as possible.  Encouraged the patient to consider purchasing a wedge pillow to help prevent reflux symptoms while sleeping.  Encouraged the patient not to lay down or sleep for at least 3 to 4 hours after eating and to try to avoid late night snacking.     Red flag symptoms reviewed while the patient was in the office today.    The patient will schedule a follow  up office visit as needed, but understands to call or contact our office if there are any issues or concerns in the mean time.    ______________________________________________________________________    HPI: The patient is a 79 y.o. male who presents today for a consultation regarding his acute esophagitis with chronic GERD symptoms.  The patient reports that since his visit to the ER and up until the day or so before he has not had any issues with GERD or reflux as long as he takes his Protonix every day.  He reports he has not had any issues since and feels it was directly related to possible food poisoning or enteritis that he had.  The patient reports that he almost canceled this appointment because he did not think there was anything that we could do and just want to make sure that there was not anything else he should follow-up on.    The patient denies any reflux, nausea, dysphagia, vomiting, decreased appetite, unplanned weight loss, or abdominal pain. Water Intake: 3-4 bottles daily.     The patient reports that they have a BM every other day and reports that it is relieving, without any consistent diarrhea, nocturnal BMs, constipation, straining, melena or bloody stools. Last BM: This morning. Flatus: Minimal.    PMH/PSH:   Abdominal/Chest Surgery: Denied  Colon Cancer: Denied  Any Cancer: Denied  Pre-Cancerous Polyps: Denied  Crohn's: Denied  Ulcerative Colitis: Denied  Lau's Esophagus: N/A  Celiac Disease: N/A  Liver Disease: Denied    Tobacco/Vaping: Denied  ETOH: Denied  Marijuana: Denied  Illicit Drug Use: Denied    FH:  Colon Cancer: Denied  Any Cancer: Denied  Family Members with Pre-Cancerous Polyps: Denied  Crohn's: Denied  Ulcerative Colitis: Denied  Celiac Disease: Denied  Liver Disease: Denied    Meds: Protonix 20 mg daily.   Daily NSAID Use: Denied  Any New Meds: Denied  Daily Tylenol Use: Denied    Imaging: (6/21/24) CT of the abdomen pelvis with contrast: Suggestive of mild  esophagitis.    Endoscopy History: EGD: (None):     COLONOSCOPY: (8/27/20): Small internal hemorrhoids with 2 benign polyps removed.    DUE: N/A secondary to age.    REVIEW OF SYSTEMS:    CONSTITUTIONAL: Denies any fever, chills, rigors, and weight loss.  HEENT: No earache or tinnitus. Denies hearing loss or visual disturbances.  CARDIOVASCULAR: No chest pain or palpitations.   RESPIRATORY: Denies any cough, hemoptysis, shortness of breath or dyspnea on exertion.  GASTROINTESTINAL: As noted in the History of Present Illness.   GENITOURINARY: No problems with urination. Denies any hematuria or dysuria.  NEUROLOGIC: No dizziness or vertigo, denies headaches.   MUSCULOSKELETAL: Denies any muscle or joint pain.   SKIN: Denies skin rashes or itching.   ENDOCRINE: Denies excessive thirst. Denies intolerance to heat or cold.  PSYCHOSOCIAL: Denies depression or anxiety. Denies any recent memory loss.       Historical Information   Past Medical History:   Diagnosis Date    Arthritis of knee, right     LAST ASSESSED: 2/7/17    Diabetes mellitus (HCC)     Does use hearing aid     jesus    Enlarged prostate     GERD (gastroesophageal reflux disease)     Headache     occ    History of gastroesophageal reflux (GERD)     LAST ASSESSED: 5/9/17    Hypertension     Morbid (severe) obesity due to excess calories (HCC)     LAST ASSESSED: 5/10/16    Obesity (BMI 30.0-34.9) 02/02/2016    Pes anserine bursitis     LAST ASSESSED: 11/29/16    Testicular hypogonadism     LAST ASSESSED: 10/30/13    Tick bite     10/27/23 on abdomen    Wears dentures     full uppers     Past Surgical History:   Procedure Laterality Date    COLONOSCOPY      ELBOW SURGERY Right     [facility] NEUROPLASTY &/TRANSPOS MEDIAN NRV CARPAL TUNNE Left 11/6/2023    Procedure: RELEASE CARPAL TUNNEL;  Surgeon: Barry Lang DO;  Location: [facility];  Service: Orthopedics    UPPER GASTROINTESTINAL ENDOSCOPY      WRIST FUSION Right      Social History   Social History      Substance and Sexual Activity   Alcohol Use Not Currently     Social History     Substance and Sexual Activity   Drug Use No     Social History     Tobacco Use   Smoking Status Former    Current packs/day: 0.00    Average packs/day: 1 pack/day for 20.0 years (20.0 ttl pk-yrs)    Types: Cigarettes    Start date: 3/10/1966    Quit date: 3/10/1986    Years since quittin.3    Passive exposure: Past   Smokeless Tobacco Never     Family History   Problem Relation Age of Onset    Diabetes type II Mother         MELLITUS    Rheumatic fever Mother     Heart disease Mother     No Known Problems Father        Meds/Allergies       Current Outpatient Medications:     cholecalciferol 1,000 units tablet    Coenzyme Q10 10 MG capsule    fluticasone (FLONASE) 50 mcg/act nasal spray    glucose blood (OneTouch Ultra) test strip    lisinopril (ZESTRIL) 40 mg tablet    Magnesium Chloride (MAGNESIUM DR PO)    metFORMIN (GLUCOPHAGE-XR) 500 mg 24 hr tablet    Multiple Vitamin (multivitamin) tablet    Omega-3 1000 MG CAPS    pantoprazole (PROTONIX) 20 mg tablet    pioglitazone (ACTOS) 30 mg tablet    sucralfate (CARAFATE) 1 g tablet    tamsulosin (FLOMAX) 0.4 mg    glimepiride (AMARYL) 4 mg tablet    Allergies   Allergen Reactions    Pollen Extract Allergic Rhinitis    Tape [Medical Tape] Rash     BANDAID ADHESIVE           Objective     There were no vitals taken for this visit. There is no height or weight on file to calculate BMI.        PHYSICAL EXAM:      General Appearance:   Alert, cooperative, no distress   HEENT:   Normocephalic, atraumatic, anicteric.     Neck:  Supple, symmetrical, trachea midline   Lungs:   Clear to auscultation bilaterally; no rales, rhonchi or wheezing; respirations unlabored    Heart::   Regular rate and rhythm; no murmur, rub, or gallop.   Abdomen:   Soft, non-tender, non-distended; normal bowel sounds; no masses, no organomegaly    Genitalia:   Deferred    Rectal:   Deferred    Extremities:  No  cyanosis, clubbing or edema    Pulses:  2+ and symmetric    Skin:  No jaundice, rashes, or lesions    Lymph nodes:  No palpable cervical lymphadenopathy        Lab Results:   No visits with results within 1 Day(s) from this visit.   Latest known visit with results is:   Admission on 06/21/2024, Discharged on 06/21/2024   Component Date Value    WBC 06/21/2024 11.35 (H)     RBC 06/21/2024 5.41     Hemoglobin 06/21/2024 16.2     Hematocrit 06/21/2024 48.9     MCV 06/21/2024 90     MCH 06/21/2024 29.9     MCHC 06/21/2024 33.1     RDW 06/21/2024 13.1     MPV 06/21/2024 11.3     Platelets 06/21/2024 306     nRBC 06/21/2024 0     Segmented % 06/21/2024 76 (H)     Immature Grans % 06/21/2024 0     Lymphocytes % 06/21/2024 20     Monocytes % 06/21/2024 4     Eosinophils Relative 06/21/2024 0     Basophils Relative 06/21/2024 0     Absolute Neutrophils 06/21/2024 8.57 (H)     Absolute Immature Grans 06/21/2024 0.02     Absolute Lymphocytes 06/21/2024 2.21     Absolute Monocytes 06/21/2024 0.47     Eosinophils Absolute 06/21/2024 0.05     Basophils Absolute 06/21/2024 0.03     Protime 06/21/2024 13.8     INR 06/21/2024 1.04     PTT 06/21/2024 24     Sodium 06/21/2024 139     Potassium 06/21/2024 3.8     Chloride 06/21/2024 100     CO2 06/21/2024 24     ANION GAP 06/21/2024 15 (H)     BUN 06/21/2024 27 (H)     Creatinine 06/21/2024 1.12     Glucose 06/21/2024 252 (H)     Calcium 06/21/2024 11.5 (H)     AST 06/21/2024 16     ALT 06/21/2024 19     Alkaline Phosphatase 06/21/2024 72     Total Protein 06/21/2024 8.4     Albumin 06/21/2024 4.8     Total Bilirubin 06/21/2024 0.82     eGFR 06/21/2024 62     Lipase 06/21/2024 55     hs TnI 0hr 06/21/2024 3     Color, UA 06/21/2024 Straw     Clarity, UA 06/21/2024 Clear     Specific Gravity, UA 06/21/2024 1.015     pH, UA 06/21/2024 7.5     Leukocytes, UA 06/21/2024 Negative     Nitrite, UA 06/21/2024 Negative     Protein, UA 06/21/2024 Negative     Glucose, UA 06/21/2024 Trace (A)      Ketones, UA 06/21/2024 15 (1+) (A)     Urobilinogen, UA 06/21/2024 0.2     Bilirubin, UA 06/21/2024 Negative     Occult Blood, UA 06/21/2024 Negative     SARS-CoV-2 06/21/2024 Negative     INFLUENZA A PCR 06/21/2024 Negative     INFLUENZA B PCR 06/21/2024 Negative     RSV PCR 06/21/2024 Negative     Ventricular Rate 06/21/2024 79     Atrial Rate 06/21/2024 79     GA Interval 06/21/2024 164     QRSD Interval 06/21/2024 104     QT Interval 06/21/2024 438     QTC Interval 06/21/2024 502     P Axis 06/21/2024 46     QRS Axis 06/21/2024 79     T Wave Axis 06/21/2024 43          Radiology Results:   XR chest 1 view portable    Result Date: 6/21/2024  Narrative: XR CHEST PORTABLE INDICATION: N/V. COMPARISON: February 23, 2024. FINDINGS: Clear lungs. No pneumothorax or pleural effusion. Normal cardiomediastinal silhouette. Bones are unremarkable for age. Normal upper abdomen.     Impression: No acute disease in the chest. Workstation performed: [id number]     CT abdomen pelvis with contrast    Result Date: 6/21/2024  Narrative: CT ABDOMEN AND PELVIS WITH IV CONTRAST INDICATION: Nausea vomiting bradycardia. COMPARISON: None. TECHNIQUE: CT examination of the abdomen and pelvis was performed. Multiplanar 2D reformatted images were created from the source data. This examination, like all CT scans performed in the [facility], was performed utilizing techniques to minimize radiation dose exposure, including the use of iterative reconstruction and automated exposure control. Radiation dose length product (DLP) for this visit: 1075 mGy-cm IV Contrast: 100 mL of iohexol (OMNIPAQUE) Enteric Contrast: Not administered. FINDINGS: ABDOMEN LOWER CHEST: Clear lung bases. Mild wall thickening in the distal esophagus at the gastroesophageal junction could represent esophagitis LIVER/BILIARY TREE: Unremarkable. GALLBLADDER: No calcified gallstones. No pericholecystic inflammatory change. SPLEEN: Unremarkable. PANCREAS:  Unremarkable. ADRENAL GLANDS: Unremarkable. KIDNEYS/URETERS: Unremarkable. No hydronephrosis. STOMACH AND BOWEL: Small hiatal hernia. No diverticulosis without evidence of diverticulitis or colitis APPENDIX: Normal appendix. ABDOMINOPELVIC CAVITY: No ascites. No pneumoperitoneum. No lymphadenopathy. VESSELS: No abdominal aortic aneurysm. PELVIS Mild prostate enlargement. URINARY BLADDER: Unremarkable. ABDOMINAL WALL/INGUINAL REGIONS: Unremarkable. BONES: No acute fracture or suspicious osseous lesion.     Impression: Findings suggestive of mild esophagitis at the gastroesophageal junction. Small hiatal hernia. No findings to indicate colitis, diverticulitis or bowel obstruction. Workstation performed: [id number]

## 2024-07-15 NOTE — ASSESSMENT & PLAN NOTE
Stable  While the patient was in the office today, I discussed with the patient that since he has only had the nausea and vomiting episode once and most likely could correlate with some kind of food poisoning or enteritis and since the imaging was only suggestive of esophagitis and he is not having any issues regarding swallowing or reflux symptoms and is already on Protonix, I see no reason to proceed with any endoscopic procedures at this time.  However, I did discuss with the patient that if his symptoms start to return or worsen, he should contact our office and we can discuss the possibility of an EGD in the future if necessary.  The patient was agreeable and verbalized an understanding.    Continue Protonix as prescribed.  Encouraged the patient to avoid acidic or trigger foods including alcohol as much as possible.  Encouraged the patient to consider purchasing a wedge pillow to help prevent reflux symptoms while sleeping.  Encouraged the patient not to lay down or sleep for at least 3 to 4 hours after eating and to try to avoid late night snacking.     Red flag symptoms reviewed while the patient was in the office today.

## 2024-07-15 NOTE — PATIENT INSTRUCTIONS
Encouraged the patient to avoid acidic or trigger foods including alcohol as much as possible.  Encouraged the patient to consider purchasing a wedge pillow to help prevent reflux symptoms while sleeping.  Encouraged the patient not to lay down or sleep for at least 3 to 4 hours after eating and to try to avoid late night snacking.   Hold of EGD for now, unless symptoms return or worsen.   Continue Protonix as prescribed.   Continue to watch for red flag symptoms.   Follow up as needed.       We did review GI red flag symptoms, including, but not limited to: chronic nausea, vomiting, diarrhea, chills, fever, and unintentional weight loss and should call or contact our office with any changes or concerns. I reviewed with the patient that if they notice any blood while vomiting or in their stool they should contact or office or go to the nearest emergency room for immediate evaluation. The patient was agreeable and verbalized an understanding.

## 2024-07-16 DIAGNOSIS — J30.1 SEASONAL ALLERGIC RHINITIS DUE TO POLLEN: ICD-10-CM

## 2024-07-16 RX ORDER — FLUTICASONE PROPIONATE 50 MCG
SPRAY, SUSPENSION (ML) NASAL
Qty: 48 G | Refills: 1 | Status: SHIPPED | OUTPATIENT
Start: 2024-07-16

## 2024-08-21 DIAGNOSIS — R35.0 BENIGN PROSTATIC HYPERPLASIA WITH URINARY FREQUENCY: ICD-10-CM

## 2024-08-21 DIAGNOSIS — E11.8 TYPE 2 DIABETES MELLITUS WITH COMPLICATION, WITHOUT LONG-TERM CURRENT USE OF INSULIN (HCC): ICD-10-CM

## 2024-08-21 DIAGNOSIS — N40.1 BENIGN PROSTATIC HYPERPLASIA WITH URINARY FREQUENCY: ICD-10-CM

## 2024-08-21 RX ORDER — PIOGLITAZONEHYDROCHLORIDE 30 MG/1
30 TABLET ORAL DAILY
Qty: 100 TABLET | Refills: 1 | Status: SHIPPED | OUTPATIENT
Start: 2024-08-21

## 2024-08-21 RX ORDER — TAMSULOSIN HYDROCHLORIDE 0.4 MG/1
0.4 CAPSULE ORAL
Qty: 100 CAPSULE | Refills: 1 | Status: SHIPPED | OUTPATIENT
Start: 2024-08-21

## 2024-09-02 DIAGNOSIS — E11.9 TYPE 2 DIABETES MELLITUS WITHOUT COMPLICATION, WITHOUT LONG-TERM CURRENT USE OF INSULIN (HCC): ICD-10-CM

## 2024-09-02 RX ORDER — GLIMEPIRIDE 4 MG/1
8 TABLET ORAL DAILY
Qty: 180 TABLET | Refills: 1 | Status: SHIPPED | OUTPATIENT
Start: 2024-09-02

## 2024-09-20 DIAGNOSIS — A04.8 H. PYLORI INFECTION: ICD-10-CM

## 2024-09-20 RX ORDER — PANTOPRAZOLE SODIUM 20 MG/1
20 TABLET, DELAYED RELEASE ORAL DAILY
Qty: 90 TABLET | Refills: 1 | Status: SHIPPED | OUTPATIENT
Start: 2024-09-20

## 2024-10-14 DIAGNOSIS — I10 ESSENTIAL HYPERTENSION: ICD-10-CM

## 2024-10-14 RX ORDER — LISINOPRIL 40 MG/1
40 TABLET ORAL DAILY
Qty: 90 TABLET | Refills: 1 | Status: SHIPPED | OUTPATIENT
Start: 2024-10-14

## 2024-11-19 DIAGNOSIS — E11.9 TYPE 2 DIABETES MELLITUS WITHOUT COMPLICATION, WITHOUT LONG-TERM CURRENT USE OF INSULIN (HCC): ICD-10-CM

## 2024-11-20 RX ORDER — BLOOD SUGAR DIAGNOSTIC
STRIP MISCELLANEOUS DAILY
Qty: 200 STRIP | Refills: 1 | Status: SHIPPED | OUTPATIENT
Start: 2024-11-20

## 2024-12-02 ENCOUNTER — APPOINTMENT (OUTPATIENT)
Dept: LAB | Facility: CLINIC | Age: 79
End: 2024-12-02
Payer: COMMERCIAL

## 2024-12-02 ENCOUNTER — RA CDI HCC (OUTPATIENT)
Dept: OTHER | Facility: HOSPITAL | Age: 79
End: 2024-12-02

## 2024-12-02 DIAGNOSIS — E11.8 TYPE 2 DIABETES MELLITUS WITH COMPLICATION, WITHOUT LONG-TERM CURRENT USE OF INSULIN (HCC): ICD-10-CM

## 2024-12-02 LAB
ALBUMIN SERPL BCG-MCNC: 4.6 G/DL (ref 3.5–5)
ALP SERPL-CCNC: 74 U/L (ref 34–104)
ALT SERPL W P-5'-P-CCNC: 22 U/L (ref 7–52)
ANION GAP SERPL CALCULATED.3IONS-SCNC: 10 MMOL/L (ref 4–13)
AST SERPL W P-5'-P-CCNC: 48 U/L (ref 13–39)
BASOPHILS # BLD AUTO: 0.03 THOUSANDS/ΜL (ref 0–0.1)
BASOPHILS NFR BLD AUTO: 0 % (ref 0–1)
BILIRUB SERPL-MCNC: 0.59 MG/DL (ref 0.2–1)
BUN SERPL-MCNC: 23 MG/DL (ref 5–25)
CALCIUM SERPL-MCNC: 10.4 MG/DL (ref 8.4–10.2)
CHLORIDE SERPL-SCNC: 97 MMOL/L (ref 96–108)
CHOLEST SERPL-MCNC: 173 MG/DL (ref ?–200)
CO2 SERPL-SCNC: 28 MMOL/L (ref 21–32)
CREAT SERPL-MCNC: 1.08 MG/DL (ref 0.6–1.3)
CREAT UR-MCNC: 145.4 MG/DL
EOSINOPHIL # BLD AUTO: 0.18 THOUSAND/ΜL (ref 0–0.61)
EOSINOPHIL NFR BLD AUTO: 2 % (ref 0–6)
ERYTHROCYTE [DISTWIDTH] IN BLOOD BY AUTOMATED COUNT: 13.3 % (ref 11.6–15.1)
EST. AVERAGE GLUCOSE BLD GHB EST-MCNC: 148 MG/DL
GFR SERPL CREATININE-BSD FRML MDRD: 65 ML/MIN/1.73SQ M
GLUCOSE P FAST SERPL-MCNC: 132 MG/DL (ref 65–99)
HBA1C MFR BLD: 6.8 %
HCT VFR BLD AUTO: 47.7 % (ref 36.5–49.3)
HDLC SERPL-MCNC: 40 MG/DL
HGB BLD-MCNC: 15.5 G/DL (ref 12–17)
IMM GRANULOCYTES # BLD AUTO: 0.01 THOUSAND/UL (ref 0–0.2)
IMM GRANULOCYTES NFR BLD AUTO: 0 % (ref 0–2)
LDLC SERPL CALC-MCNC: 109 MG/DL (ref 0–100)
LYMPHOCYTES # BLD AUTO: 2.43 THOUSANDS/ΜL (ref 0.6–4.47)
LYMPHOCYTES NFR BLD AUTO: 30 % (ref 14–44)
MCH RBC QN AUTO: 30 PG (ref 26.8–34.3)
MCHC RBC AUTO-ENTMCNC: 32.5 G/DL (ref 31.4–37.4)
MCV RBC AUTO: 92 FL (ref 82–98)
MICROALBUMIN UR-MCNC: 89.2 MG/L
MICROALBUMIN/CREAT 24H UR: 61 MG/G CREATININE (ref 0–30)
MONOCYTES # BLD AUTO: 0.64 THOUSAND/ΜL (ref 0.17–1.22)
MONOCYTES NFR BLD AUTO: 8 % (ref 4–12)
NEUTROPHILS # BLD AUTO: 4.75 THOUSANDS/ΜL (ref 1.85–7.62)
NEUTS SEG NFR BLD AUTO: 60 % (ref 43–75)
NRBC BLD AUTO-RTO: 0 /100 WBCS
PLATELET # BLD AUTO: 316 THOUSANDS/UL (ref 149–390)
PMV BLD AUTO: 11.5 FL (ref 8.9–12.7)
POTASSIUM SERPL-SCNC: 4.9 MMOL/L (ref 3.5–5.3)
PROT SERPL-MCNC: 8.1 G/DL (ref 6.4–8.4)
RBC # BLD AUTO: 5.17 MILLION/UL (ref 3.88–5.62)
SODIUM SERPL-SCNC: 135 MMOL/L (ref 135–147)
TRIGL SERPL-MCNC: 122 MG/DL (ref ?–150)
WBC # BLD AUTO: 8.04 THOUSAND/UL (ref 4.31–10.16)

## 2024-12-02 PROCEDURE — 80053 COMPREHEN METABOLIC PANEL: CPT

## 2024-12-02 PROCEDURE — 83036 HEMOGLOBIN GLYCOSYLATED A1C: CPT

## 2024-12-02 PROCEDURE — 85025 COMPLETE CBC W/AUTO DIFF WBC: CPT

## 2024-12-02 PROCEDURE — 82570 ASSAY OF URINE CREATININE: CPT

## 2024-12-02 PROCEDURE — 80061 LIPID PANEL: CPT

## 2024-12-02 PROCEDURE — 82043 UR ALBUMIN QUANTITATIVE: CPT

## 2024-12-02 PROCEDURE — 36415 COLL VENOUS BLD VENIPUNCTURE: CPT

## 2024-12-02 NOTE — PROGRESS NOTES
HCC coding opportunities          Chart Reviewed number of suggestions sent to Provider: 2  E11.22  Recommend adding I12.9 - combination code- hypertensive renal disease.  Patient's eGFR values have been in the CKD stage 2 range going back to 2018     Patients Insurance     Medicare Insurance: [employer]age

## 2024-12-04 ENCOUNTER — RESULTS FOLLOW-UP (OUTPATIENT)
Dept: INTERNAL MEDICINE CLINIC | Facility: CLINIC | Age: 79
End: 2024-12-04

## 2024-12-10 ENCOUNTER — OFFICE VISIT (OUTPATIENT)
Dept: INTERNAL MEDICINE CLINIC | Facility: CLINIC | Age: 79
End: 2024-12-10
Payer: COMMERCIAL

## 2024-12-10 VITALS
TEMPERATURE: 98 F | DIASTOLIC BLOOD PRESSURE: 84 MMHG | SYSTOLIC BLOOD PRESSURE: 126 MMHG | HEIGHT: 69 IN | OXYGEN SATURATION: 94 % | BODY MASS INDEX: 36.4 KG/M2 | WEIGHT: 245.8 LBS | HEART RATE: 71 BPM

## 2024-12-10 DIAGNOSIS — E11.8 TYPE 2 DIABETES MELLITUS WITH COMPLICATION, WITHOUT LONG-TERM CURRENT USE OF INSULIN (HCC): ICD-10-CM

## 2024-12-10 DIAGNOSIS — E11.9 TYPE 2 DIABETES MELLITUS WITHOUT COMPLICATION, WITHOUT LONG-TERM CURRENT USE OF INSULIN (HCC): Primary | ICD-10-CM

## 2024-12-10 DIAGNOSIS — K20.90 ESOPHAGITIS: ICD-10-CM

## 2024-12-10 DIAGNOSIS — I10 ESSENTIAL HYPERTENSION: ICD-10-CM

## 2024-12-10 DIAGNOSIS — N40.1 BENIGN PROSTATIC HYPERPLASIA WITH LOWER URINARY TRACT SYMPTOMS, SYMPTOM DETAILS UNSPECIFIED: ICD-10-CM

## 2024-12-10 PROCEDURE — G2211 COMPLEX E/M VISIT ADD ON: HCPCS | Performed by: INTERNAL MEDICINE

## 2024-12-10 PROCEDURE — 99214 OFFICE O/P EST MOD 30 MIN: CPT | Performed by: INTERNAL MEDICINE

## 2024-12-10 RX ORDER — SUCRALFATE 1 G/1
1 TABLET ORAL DAILY
Qty: 90 TABLET | Refills: 1 | Status: SHIPPED | OUTPATIENT
Start: 2024-12-10 | End: 2025-06-08

## 2024-12-10 NOTE — PROGRESS NOTES
"Name: García Varela      : 1945      MRN: [id number]  Encounter Provider: Villa Mari DO  Encounter Date: 12/10/2024   Encounter department: [facility]  :  Assessment & Plan  Type 2 diabetes mellitus without complication, without long-term current use of insulin (Formerly [facility])  Glimepiride 4 mg  (2) Tabs PO Qday, Metformin  mg (2) BID and Actos 30 mg Qday  A1c is good   No hypoglycemia noted by the patient   Continue to follow  Lab Results   Component Value Date    HGBA1C 6.8 (H) 2024            Esophagitis  GERD controlled with a combination of Protonix 20 mg and Carafate 1 Cap Daily  Orders:  •  sucralfate (CARAFATE) 1 g tablet; Take 1 tablet (1 g total) by mouth in the morning Take 1 tablet before meals and at bedtime.  Remember do not take any other medications for 30 to 45 minutes after taking the Carafate.    Essential hypertension  BP good with the Current dose of Lisinopril 40 mg Qday       Type 2 diabetes mellitus with complication, without long-term current use of insulin (Formerly [facility])    Lab Results   Component Value Date    HGBA1C 6.8 (H) 2024          Benign prostatic hyperplasia with lower urinary tract symptoms, symptom details unspecified  Flomax 0.4 mg Qday                History of Present Illness     HPI  Review of Systems   Constitutional:  Negative for chills, fatigue and fever.   HENT: Negative.     Respiratory:  Negative for cough, chest tightness and shortness of breath.    Cardiovascular:  Negative for chest pain and palpitations.   Gastrointestinal:  Negative for abdominal pain, constipation, diarrhea, nausea and vomiting.   Genitourinary: Negative.    Musculoskeletal:  Negative for back pain and myalgias.   Skin: Negative.    Neurological: Negative.    Psychiatric/Behavioral:  Negative for dysphoric mood. The patient is not nervous/anxious.        Objective   /84   Pulse 71   Temp 98 °F (36.7 °C)   Ht 5' 8.5\" (1.74 m)   Wt 111 kg (245 lb 12.8 oz)  "  SpO2 94%   BMI 36.83 kg/m²      Physical Exam  Vitals and nursing note reviewed.   Constitutional:       General: He is not in acute distress.     Appearance: He is well-developed.   HENT:      Head: Normocephalic and atraumatic.   Eyes:      Conjunctiva/sclera: Conjunctivae normal.   Cardiovascular:      Rate and Rhythm: Normal rate and regular rhythm.      Heart sounds: No murmur heard.  Pulmonary:      Effort: Pulmonary effort is normal. No respiratory distress.      Breath sounds: Normal breath sounds.   Abdominal:      Palpations: Abdomen is soft.      Tenderness: There is no abdominal tenderness.   Musculoskeletal:         General: No swelling.      Cervical back: Neck supple.   Skin:     General: Skin is warm and dry.      Capillary Refill: Capillary refill takes less than 2 seconds.   Neurological:      Mental Status: He is alert.   Psychiatric:         Mood and Affect: Mood normal.

## 2024-12-10 NOTE — ASSESSMENT & PLAN NOTE
GERD controlled with a combination of Protonix 20 mg and Carafate 1 Cap Daily  Orders:  •  sucralfate (CARAFATE) 1 g tablet; Take 1 tablet (1 g total) by mouth in the morning Take 1 tablet before meals and at bedtime.  Remember do not take any other medications for 30 to 45 minutes after taking the Carafate.

## 2025-01-12 DIAGNOSIS — J30.1 SEASONAL ALLERGIC RHINITIS DUE TO POLLEN: ICD-10-CM

## 2025-01-12 RX ORDER — FLUTICASONE PROPIONATE 50 MCG
SPRAY, SUSPENSION (ML) NASAL
Qty: 48 G | Refills: 1 | Status: SHIPPED | OUTPATIENT
Start: 2025-01-12

## 2025-03-01 DIAGNOSIS — E11.9 TYPE 2 DIABETES MELLITUS WITHOUT COMPLICATION, WITHOUT LONG-TERM CURRENT USE OF INSULIN (HCC): ICD-10-CM

## 2025-03-01 RX ORDER — GLIMEPIRIDE 4 MG/1
8 TABLET ORAL DAILY
Qty: 180 TABLET | Refills: 1 | Status: SHIPPED | OUTPATIENT
Start: 2025-03-01

## 2025-03-09 DIAGNOSIS — N40.1 BENIGN PROSTATIC HYPERPLASIA WITH URINARY FREQUENCY: ICD-10-CM

## 2025-03-09 DIAGNOSIS — E11.8 TYPE 2 DIABETES MELLITUS WITH COMPLICATION, WITHOUT LONG-TERM CURRENT USE OF INSULIN (HCC): ICD-10-CM

## 2025-03-09 DIAGNOSIS — R35.0 BENIGN PROSTATIC HYPERPLASIA WITH URINARY FREQUENCY: ICD-10-CM

## 2025-03-09 RX ORDER — TAMSULOSIN HYDROCHLORIDE 0.4 MG/1
0.4 CAPSULE ORAL
Qty: 100 CAPSULE | Refills: 1 | Status: SHIPPED | OUTPATIENT
Start: 2025-03-09

## 2025-03-09 RX ORDER — PIOGLITAZONE 30 MG/1
30 TABLET ORAL DAILY
Qty: 100 TABLET | Refills: 1 | Status: SHIPPED | OUTPATIENT
Start: 2025-03-09

## 2025-03-16 DIAGNOSIS — A04.8 H. PYLORI INFECTION: ICD-10-CM

## 2025-03-17 RX ORDER — PANTOPRAZOLE SODIUM 20 MG/1
20 TABLET, DELAYED RELEASE ORAL DAILY
Qty: 90 TABLET | Refills: 1 | Status: SHIPPED | OUTPATIENT
Start: 2025-03-17

## 2025-04-07 DIAGNOSIS — I10 ESSENTIAL HYPERTENSION: ICD-10-CM

## 2025-04-08 RX ORDER — LISINOPRIL 40 MG/1
40 TABLET ORAL DAILY
Qty: 90 TABLET | Refills: 1 | Status: SHIPPED | OUTPATIENT
Start: 2025-04-08

## 2025-04-14 ENCOUNTER — RA CDI HCC (OUTPATIENT)
Dept: OTHER | Facility: HOSPITAL | Age: 80
End: 2025-04-14

## 2025-04-14 NOTE — PROGRESS NOTES
HCC coding opportunities    E11.22, E11.36  GR     Chart Reviewed number of suggestions sent to Provider: 2     Patients Insurance     Medicare Insurance: [employer]age

## 2025-04-15 ENCOUNTER — OFFICE VISIT (OUTPATIENT)
Dept: INTERNAL MEDICINE CLINIC | Facility: CLINIC | Age: 80
End: 2025-04-15
Payer: COMMERCIAL

## 2025-04-15 VITALS
OXYGEN SATURATION: 97 % | HEART RATE: 75 BPM | HEIGHT: 69 IN | DIASTOLIC BLOOD PRESSURE: 64 MMHG | WEIGHT: 255 LBS | BODY MASS INDEX: 37.77 KG/M2 | SYSTOLIC BLOOD PRESSURE: 134 MMHG

## 2025-04-15 DIAGNOSIS — Z00.00 MEDICARE ANNUAL WELLNESS VISIT, SUBSEQUENT: Primary | ICD-10-CM

## 2025-04-15 DIAGNOSIS — E11.36 TYPE 2 DIABETES MELLITUS WITH DIABETIC CATARACT, WITHOUT LONG-TERM CURRENT USE OF INSULIN (HCC): ICD-10-CM

## 2025-04-15 DIAGNOSIS — E66.01 OBESITY, MORBID (HCC): ICD-10-CM

## 2025-04-15 DIAGNOSIS — I10 ESSENTIAL HYPERTENSION: ICD-10-CM

## 2025-04-15 DIAGNOSIS — E11.8 TYPE 2 DIABETES MELLITUS WITH COMPLICATION, WITHOUT LONG-TERM CURRENT USE OF INSULIN (HCC): ICD-10-CM

## 2025-04-15 PROCEDURE — G2211 COMPLEX E/M VISIT ADD ON: HCPCS | Performed by: INTERNAL MEDICINE

## 2025-04-15 PROCEDURE — 99214 OFFICE O/P EST MOD 30 MIN: CPT | Performed by: INTERNAL MEDICINE

## 2025-04-15 PROCEDURE — G0439 PPPS, SUBSEQ VISIT: HCPCS | Performed by: INTERNAL MEDICINE

## 2025-04-15 RX ORDER — AMLODIPINE BESYLATE 2.5 MG/1
2.5 TABLET ORAL DAILY
Qty: 100 TABLET | Refills: 2 | Status: SHIPPED | OUTPATIENT
Start: 2025-04-15 | End: 2026-02-09

## 2025-04-15 NOTE — PATIENT INSTRUCTIONS
Medicare Preventive Visit Patient Instructions  Thank you for completing your Welcome to Medicare Visit or Medicare Annual Wellness Visit today. Your next wellness visit will be due in one year (4/16/2026).  The screening/preventive services that you may require over the next 5-10 years are detailed below. Some tests may not apply to you based off risk factors and/or age. Screening tests ordered at today's visit but not completed yet may show as past due. Also, please note that scanned in results may not display below.  Preventive Screenings:  Service Recommendations Previous Testing/Comments   Colorectal Cancer Screening  Colonoscopy    Fecal Occult Blood Test (FOBT)/Fecal Immunochemical Test (FIT)  Fecal DNA/Cologuard Test  Flexible Sigmoidoscopy Age: 45-75 years old   Colonoscopy: every 10 years (May be performed more frequently if at higher risk)  OR  FOBT/FIT: every 1 year  OR  Cologuard: every 3 years  OR  Sigmoidoscopy: every 5 years  Screening may be recommended earlier than age 45 if at higher risk for colorectal cancer. Also, an individualized decision between you and your healthcare provider will decide whether screening between the ages of 76-85 would be appropriate. Colonoscopy: 08/27/2020  FOBT/FIT: Not on file  Cologuard: Not on file  Sigmoidoscopy: Not on file          Prostate Cancer Screening Individualized decision between patient and health care provider in men between ages of 55-69   Medicare will cover every 12 months beginning on the day after your 50th birthday PSA: 0.5 ng/mL     Screening Not Indicated     Hepatitis C Screening Once for adults born between 1945 and 1965  More frequently in patients at high risk for Hepatitis C Hep C Antibody: 09/11/2018    Screening Current   Diabetes Screening 1-2 times per year if you're at risk for diabetes or have pre-diabetes Fasting glucose: 132 mg/dL (12/2/2024)  A1C: 6.8 % (12/2/2024)  Screening Not Indicated  History Diabetes   Cholesterol Screening  Once every 5 years if you don't have a lipid disorder. May order more often based on risk factors. Lipid panel: 12/02/2024  Screening Not Indicated  History Lipid Disorder      Other Preventive Screenings Covered by Medicare:  Abdominal Aortic Aneurysm (AAA) Screening: covered once if your at risk. You're considered to be at risk if you have a family history of AAA or a male between the age of 65-75 who smoking at least 100 cigarettes in your lifetime.  Lung Cancer Screening: covers low dose CT scan once per year if you meet all of the following conditions: (1) Age 55-77; (2) No signs or symptoms of lung cancer; (3) Current smoker or have quit smoking within the last 15 years; (4) You have a tobacco smoking history of at least 20 pack years (packs per day x number of years you smoked); (5) You get a written order from a healthcare provider.  Glaucoma Screening: covered annually if you're considered high risk: (1) You have diabetes OR (2) Family history of glaucoma OR (3)  aged 50 and older OR (4)  American aged 65 and older  Osteoporosis Screening: covered every 2 years if you meet one of the following conditions: (1) Have a vertebral abnormality; (2) On glucocorticoid therapy for more than 3 months; (3) Have primary hyperparathyroidism; (4) On osteoporosis medications and need to assess response to drug therapy.  HIV Screening: covered annually if you're between the age of 15-65. Also covered annually if you are younger than 15 and older than 65 with risk factors for HIV infection. For pregnant patients, it is covered up to 3 times per pregnancy.    Immunizations:  Immunization Recommendations   Influenza Vaccine Annual influenza vaccination during flu season is recommended for all persons aged >= 6 months who do not have contraindications   Pneumococcal Vaccine   * Pneumococcal conjugate vaccine = PCV13 (Prevnar 13), PCV15 (Vaxneuvance), PCV20 (Prevnar 20)  * Pneumococcal polysaccharide  vaccine = PPSV23 (Pneumovax) Adults 19-63 yo with certain risk factors or if 65+ yo  If never received any pneumonia vaccine: recommend Prevnar 20 (PCV20)  Give PCV20 if previously received 1 dose of PCV13 or PPSV23   Hepatitis B Vaccine 3 dose series if at intermediate or high risk (ex: diabetes, end stage renal disease, liver disease)   Respiratory syncytial virus (RSV) Vaccine - COVERED BY MEDICARE PART D  * RSVPreF3 (Arexvy) CDC recommends that adults 60 years of age and older may receive a single dose of RSV vaccine using shared clinical decision-making (SCDM)   Tetanus (Td) Vaccine - COST NOT COVERED BY MEDICARE PART B Following completion of primary series, a booster dose should be given every 10 years to maintain immunity against tetanus. Td may also be given as tetanus wound prophylaxis.   Tdap Vaccine - COST NOT COVERED BY MEDICARE PART B Recommended at least once for all adults. For pregnant patients, recommended with each pregnancy.   Shingles Vaccine (Shingrix) - COST NOT COVERED BY MEDICARE PART B  2 shot series recommended in those 19 years and older who have or will have weakened immune systems or those 50 years and older     Health Maintenance Due:      Topic Date Due   • Hepatitis C Screening  Completed   • Colorectal Cancer Screening  Discontinued     Immunizations Due:      Topic Date Due   • COVID-19 Vaccine (9 - 2024-25 season) 03/12/2025     Advance Directives   What are advance directives?  Advance directives are legal documents that state your wishes and plans for medical care. These plans are made ahead of time in case you lose your ability to make decisions for yourself. Advance directives can apply to any medical decision, such as the treatments you want, and if you want to donate organs.   What are the types of advance directives?  There are many types of advance directives, and each state has rules about how to use them. You may choose a combination of any of the following:  Living  will:  This is a written record of the treatment you want. You can also choose which treatments you do not want, which to limit, and which to stop at a certain time. This includes surgery, medicine, IV fluid, and tube feedings.   Durable power of  for healthcare (DPAHC):  This is a written record that states who you want to make healthcare choices for you when you are unable to make them for yourself. This person, called a proxy, is usually a family member or a friend. You may choose more than 1 proxy.  Do not resuscitate (DNR) order:  A DNR order is used in case your heart stops beating or you stop breathing. It is a request not to have certain forms of treatment, such as CPR. A DNR order may be included in other types of advance directives.  Medical directive:  This covers the care that you want if you are in a coma, near death, or unable to make decisions for yourself. You can list the treatments you want for each condition. Treatment may include pain medicine, surgery, blood transfusions, dialysis, IV or tube feedings, and a ventilator (breathing machine).  Values history:  This document has questions about your views, beliefs, and how you feel and think about life. This information can help others choose the care that you would choose.  Why are advance directives important?  An advance directive helps you control your care. Although spoken wishes may be used, it is better to have your wishes written down. Spoken wishes can be misunderstood, or not followed. Treatments may be given even if you do not want them. An advance directive may make it easier for your family to make difficult choices about your care.   Weight Management   Why it is important to manage your weight:  Being overweight increases your risk of health conditions such as heart disease, high blood pressure, type 2 diabetes, and certain types of cancer. It can also increase your risk for osteoarthritis, sleep apnea, and other respiratory  problems. Aim for a slow, steady weight loss. Even a small amount of weight loss can lower your risk of health problems.  How to lose weight safely:  A safe and healthy way to lose weight is to eat fewer calories and get regular exercise. You can lose up about 1 pound a week by decreasing the number of calories you eat by 500 calories each day.   Healthy meal plan for weight management:  A healthy meal plan includes a variety of foods, contains fewer calories, and helps you stay healthy. A healthy meal plan includes the following:  Eat whole-grain foods more often.  A healthy meal plan should contain fiber. Fiber is the part of grains, fruits, and vegetables that is not broken down by your body. Whole-grain foods are healthy and provide extra fiber in your diet. Some examples of whole-grain foods are whole-wheat breads and pastas, oatmeal, brown rice, and bulgur.  Eat a variety of vegetables every day.  Include dark, leafy greens such as spinach, kale, otilio greens, and mustard greens. Eat yellow and orange vegetables such as carrots, sweet potatoes, and winter squash.   Eat a variety of fruits every day.  Choose fresh or canned fruit (canned in its own juice or light syrup) instead of juice. Fruit juice has very little or no fiber.  Eat low-fat dairy foods.  Drink fat-free (skim) milk or 1% milk. Eat fat-free yogurt and low-fat cottage cheese. Try low-fat cheeses such as mozzarella and other reduced-fat cheeses.  Choose meat and other protein foods that are low in fat.  Choose beans or other legumes such as split peas or lentils. Choose fish, skinless poultry (chicken or turkey), or lean cuts of red meat (beef or pork). Before you cook meat or poultry, cut off any visible fat.   Use less fat and oil.  Try baking foods instead of frying them. Add less fat, such as margarine, sour cream, regular salad dressing and mayonnaise to foods. Eat fewer high-fat foods. Some examples of high-fat foods include french fries,  doughnuts, ice cream, and cakes.  Eat fewer sweets.  Limit foods and drinks that are high in sugar. This includes candy, cookies, regular soda, and sweetened drinks.  Exercise:  Exercise at least 30 minutes per day on most days of the week. Some examples of exercise include walking, biking, dancing, and swimming. You can also fit in more physical activity by taking the stairs instead of the elevator or parking farther away from stores. Ask your healthcare provider about the best exercise plan for you.      © Copyright [employer] 2018 Information is for End User's use only and may not be sold, redistributed or otherwise used for commercial purposes. All illustrations and images included in CareNotes® are the copyrighted property of A.D.A.M., Inc. or [employer]

## 2025-04-15 NOTE — PROGRESS NOTES
Name: García Varela      : 1945      MRN: [id number]  Encounter Provider: Villa Mari DO  Encounter Date: 4/15/2025   Encounter department: [facility]  :  Assessment & Plan  Type 2 diabetes mellitus with complication, without long-term current use of insulin (HCC)  Continue the dose of Metfromin XR 1000 mg Qday with the Actos 30 mg Qday and Glimepiride 4 mg (2) Qday  A1c okay  Lab Results   Component Value Date    HGBA1C 6.8 (H) 2024            Medicare annual wellness visit, subsequent  Completed       Essential hypertension  Continue the Lisinopril 40 mg Qday and add a CCB  Orders:  •  amLODIPine (NORVASC) 2.5 mg tablet; Take 1 tablet (2.5 mg total) by mouth daily    Type 2 diabetes mellitus with diabetic cataract, without long-term current use of insulin (HCC)  See above  Lab Results   Component Value Date    HGBA1C 6.8 (H) 2024          Obesity, morbid (HCC)  The patien is interested in increasing activity and we will work on diet            Preventive health issues were discussed with patient, and age appropriate screening tests were ordered as noted in patient's After Visit Summary. Personalized health advice and appropriate referrals for health education or preventive services given if needed, as noted in patient's After Visit Summary.    History of Present Illness     HPI   Patient Care Team:  Villa Mari DO as PCP - General (Internal Medicine)  Villa Mari DO as PCP - PCP-[facility] ([facility])  Villa Mari DO as PCP - PCP-[facility] ([facility])    Review of Systems   Constitutional:  Negative for chills and fever.   HENT:  Negative for ear pain and sore throat.    Eyes:  Negative for pain and visual disturbance.   Respiratory:  Negative for cough and shortness of breath.    Cardiovascular:  Negative for chest pain and palpitations.   Gastrointestinal:  Negative for abdominal pain and vomiting.   Genitourinary:  Negative for dysuria and hematuria.    Musculoskeletal:  Negative for arthralgias and back pain.   Skin:  Negative for color change and rash.   Neurological:  Negative for seizures and syncope.   All other systems reviewed and are negative.    Medical History Reviewed by provider this encounter:       Annual Wellness Visit Questionnaire   García is here for his Subsequent Wellness visit. Last Medicare Wellness visit information reviewed, patient interviewed and updates made to the record today.      Health Risk Assessment:   Patient rates overall health as very good. Patient feels that their physical health rating is slightly worse. Patient is very satisfied with their life. Eyesight was rated as same. Hearing was rated as same. Patient feels that their emotional and mental health rating is same. Patients states they are never, rarely angry. Patient states they are sometimes unusually tired/fatigued. Pain experienced in the last 7 days has been none. Patient states that he has experienced no weight loss or gain in last 6 months.     Depression Screening:   PHQ-2 Score: 0      Fall Risk Screening:   In the past year, patient has experienced: no history of falling in past year      Home Safety:  Patient does not have trouble with stairs inside or outside of their home. Patient has working smoke alarms and has working carbon monoxide detector. Home safety hazards include: none.     Nutrition:   Current diet is Regular.     Medications:   Patient is not currently taking any over-the-counter supplements. Patient is able to manage medications.     Activities of Daily Living (ADLs)/Instrumental Activities of Daily Living (IADLs):   Walk and transfer into and out of bed and chair?: Yes  Dress and groom yourself?: Yes    Bathe or shower yourself?: Yes    Feed yourself? Yes  Do your laundry/housekeeping?: Yes  Manage your money, pay your bills and track your expenses?: Yes  Make your own meals?: Yes    Do your own shopping?: Yes    Previous Hospitalizations:    Any hospitalizations or ED visits within the last 12 months?: No      Advance Care Planning:   Living will: Yes    Durable POA for healthcare: Yes    Advanced directive: Yes    Advanced directive counseling given: No    Five wishes given: No    Patient declined ACP directive: No    End of Life Decisions reviewed with patient: Yes    Provider agrees with end of life decisions: Yes      Cognitive Screening:   Provider or family/friend/caregiver concerned regarding cognition?: No    Preventive Screenings      Cardiovascular Screening:    General: Screening Not Indicated and History Lipid Disorder      Diabetes Screening:     General: Screening Not Indicated and History Diabetes      Colorectal Cancer Screening:     General: Screening Not Indicated      Prostate Cancer Screening:    General: Screening Not Indicated      Osteoporosis Screening:    General: Patient Declines      Abdominal Aortic Aneurysm (AAA) Screening:    Risk factors include: tobacco use        General: Screening Not Indicated      Lung Cancer Screening:     General: Screening Not Indicated      Hepatitis C Screening:    General: Screening Current    Screening, Brief Intervention, and Referral to Treatment (SBIRT)     Screening    Typical number of drinks in a week: 0    Single Item Drug Screening:  How often have you used an illegal drug (including marijuana) or a prescription medication for non-medical reasons in the past year? never    Single Item Drug Screen Score: 0  Interpretation: Negative screen for possible drug use disorder    Social Drivers of Health     Financial Resource Strain: Low Risk  (3/11/2024)    Overall Financial Resource Strain (CARDIA)    • Difficulty of Paying Living Expenses: Not hard at all   Food Insecurity: No Food Insecurity (4/15/2025)    Hunger Vital Sign    • Worried About Running Out of Food in the Last Year: Never true    • Ran Out of Food in the Last Year: Never true   Transportation Needs: No Transportation Needs  "(4/15/2025)    PRAPARE - Transportation    • Lack of Transportation (Medical): No    • Lack of Transportation (Non-Medical): No   Housing Stability: Low Risk  (4/15/2025)    Housing Stability Vital Sign    • Unable to Pay for Housing in the Last Year: No    • Number of Times Moved in the Last Year: 0    • Homeless in the Last Year: No   Utilities: Not At Risk (4/15/2025)    [facility] Utilities    • Threatened with loss of utilities: No     No results found.    Objective   /64   Pulse 75   Ht 5' 8.5\" (1.74 m)   Wt 116 kg (255 lb)   SpO2 97%   BMI 38.21 kg/m²     Physical Exam  Vitals and nursing note reviewed.   Constitutional:       General: He is not in acute distress.     Appearance: He is well-developed.   HENT:      Head: Normocephalic and atraumatic.   Eyes:      Conjunctiva/sclera: Conjunctivae normal.   Cardiovascular:      Rate and Rhythm: Normal rate and regular rhythm.      Heart sounds: No murmur heard.  Pulmonary:      Effort: Pulmonary effort is normal. No respiratory distress.      Breath sounds: Normal breath sounds.   Abdominal:      Palpations: Abdomen is soft.      Tenderness: There is no abdominal tenderness.   Musculoskeletal:         General: No swelling.      Cervical back: Neck supple.   Skin:     General: Skin is warm and dry.      Capillary Refill: Capillary refill takes less than 2 seconds.   Neurological:      Mental Status: He is alert.   Psychiatric:         Mood and Affect: Mood normal.         "

## 2025-04-15 NOTE — ASSESSMENT & PLAN NOTE
Continue the Lisinopril 40 mg Qday and add a CCB  Orders:  •  amLODIPine (NORVASC) 2.5 mg tablet; Take 1 tablet (2.5 mg total) by mouth daily

## 2025-04-15 NOTE — ASSESSMENT & PLAN NOTE
Continue the dose of Metfromin XR 1000 mg Qday with the Actos 30 mg Qday and Glimepiride 4 mg (2) Qday  A1c okay  Lab Results   Component Value Date    HGBA1C 6.8 (H) 12/02/2024

## 2025-05-23 ENCOUNTER — TELEPHONE (OUTPATIENT)
Dept: CARDIOLOGY CLINIC | Facility: CLINIC | Age: 80
End: 2025-05-23

## 2025-05-23 ENCOUNTER — HOSPITAL ENCOUNTER (EMERGENCY)
Facility: HOSPITAL | Age: 80
Discharge: HOME | End: 2025-05-23
Attending: FAMILY MEDICINE
Payer: COMMERCIAL

## 2025-05-23 ENCOUNTER — APPOINTMENT (EMERGENCY)
Dept: RADIOLOGY | Facility: HOSPITAL | Age: 80
End: 2025-05-23
Payer: COMMERCIAL

## 2025-05-23 ENCOUNTER — APPOINTMENT (EMERGENCY)
Dept: CT IMAGING | Facility: HOSPITAL | Age: 80
End: 2025-05-23
Payer: COMMERCIAL

## 2025-05-23 VITALS
RESPIRATION RATE: 18 BRPM | HEART RATE: 62 BPM | SYSTOLIC BLOOD PRESSURE: 141 MMHG | DIASTOLIC BLOOD PRESSURE: 80 MMHG | OXYGEN SATURATION: 93 % | TEMPERATURE: 96.9 F

## 2025-05-23 DIAGNOSIS — F41.9 ANXIETY: ICD-10-CM

## 2025-05-23 DIAGNOSIS — R55 NEAR SYNCOPE: Primary | ICD-10-CM

## 2025-05-23 DIAGNOSIS — E83.42 HYPOMAGNESEMIA: ICD-10-CM

## 2025-05-23 LAB
ANION GAP SERPL CALCULATED.3IONS-SCNC: 9 MMOL/L (ref 4–13)
ATRIAL RATE: 64 BPM
BASOPHILS # BLD AUTO: 0.02 THOUSANDS/ÂΜL (ref 0–0.1)
BASOPHILS NFR BLD AUTO: 0 % (ref 0–1)
BNP SERPL-MCNC: 56 PG/ML (ref 0–100)
BUN SERPL-MCNC: 22 MG/DL (ref 5–25)
CALCIUM SERPL-MCNC: 10.2 MG/DL (ref 8.4–10.2)
CHLORIDE SERPL-SCNC: 102 MMOL/L (ref 96–108)
CO2 SERPL-SCNC: 26 MMOL/L (ref 21–32)
CREAT SERPL-MCNC: 1.13 MG/DL (ref 0.6–1.3)
EOSINOPHIL # BLD AUTO: 0.07 THOUSAND/ÂΜL (ref 0–0.61)
EOSINOPHIL NFR BLD AUTO: 1 % (ref 0–6)
ERYTHROCYTE [DISTWIDTH] IN BLOOD BY AUTOMATED COUNT: 13 % (ref 11.6–15.1)
GFR SERPL CREATININE-BSD FRML MDRD: 61 ML/MIN/1.73SQ M
GLUCOSE SERPL-MCNC: 235 MG/DL (ref 65–140)
HCT VFR BLD AUTO: 47.3 % (ref 36.5–49.3)
HGB BLD-MCNC: 15.3 G/DL (ref 12–17)
IMM GRANULOCYTES # BLD AUTO: 0.08 THOUSAND/UL (ref 0–0.2)
IMM GRANULOCYTES NFR BLD AUTO: 1 % (ref 0–2)
LYMPHOCYTES # BLD AUTO: 2.12 THOUSANDS/ÂΜL (ref 0.6–4.47)
LYMPHOCYTES NFR BLD AUTO: 16 % (ref 14–44)
MAGNESIUM SERPL-MCNC: 1.5 MG/DL (ref 1.9–2.7)
MCH RBC QN AUTO: 29.9 PG (ref 26.8–34.3)
MCHC RBC AUTO-ENTMCNC: 32.3 G/DL (ref 31.4–37.4)
MCV RBC AUTO: 92 FL (ref 82–98)
MONOCYTES # BLD AUTO: 0.56 THOUSAND/ÂΜL (ref 0.17–1.22)
MONOCYTES NFR BLD AUTO: 4 % (ref 4–12)
NEUTROPHILS # BLD AUTO: 10.11 THOUSANDS/ÂΜL (ref 1.85–7.62)
NEUTS SEG NFR BLD AUTO: 78 % (ref 43–75)
NRBC BLD AUTO-RTO: 0 /100 WBCS
P AXIS: 30 DEGREES
PLATELET # BLD AUTO: 286 THOUSANDS/UL (ref 149–390)
PMV BLD AUTO: 10.8 FL (ref 8.9–12.7)
POTASSIUM SERPL-SCNC: 4.3 MMOL/L (ref 3.5–5.3)
PR INTERVAL: 172 MS
QRS AXIS: 93 DEGREES
QRSD INTERVAL: 106 MS
QT INTERVAL: 420 MS
QTC INTERVAL: 433 MS
RBC # BLD AUTO: 5.12 MILLION/UL (ref 3.88–5.62)
SODIUM SERPL-SCNC: 137 MMOL/L (ref 135–147)
T WAVE AXIS: 33 DEGREES
VENTRICULAR RATE: 64 BPM
WBC # BLD AUTO: 12.96 THOUSAND/UL (ref 4.31–10.16)

## 2025-05-23 PROCEDURE — 93010 ELECTROCARDIOGRAM REPORT: CPT | Performed by: INTERNAL MEDICINE

## 2025-05-23 PROCEDURE — 36415 COLL VENOUS BLD VENIPUNCTURE: CPT | Performed by: FAMILY MEDICINE

## 2025-05-23 PROCEDURE — 96365 THER/PROPH/DIAG IV INF INIT: CPT

## 2025-05-23 PROCEDURE — 70450 CT HEAD/BRAIN W/O DYE: CPT

## 2025-05-23 PROCEDURE — 83735 ASSAY OF MAGNESIUM: CPT | Performed by: FAMILY MEDICINE

## 2025-05-23 PROCEDURE — 80048 BASIC METABOLIC PNL TOTAL CA: CPT | Performed by: FAMILY MEDICINE

## 2025-05-23 PROCEDURE — 85025 COMPLETE CBC W/AUTO DIFF WBC: CPT | Performed by: EMERGENCY MEDICINE

## 2025-05-23 PROCEDURE — 99285 EMERGENCY DEPT VISIT HI MDM: CPT

## 2025-05-23 PROCEDURE — 99285 EMERGENCY DEPT VISIT HI MDM: CPT | Performed by: EMERGENCY MEDICINE

## 2025-05-23 PROCEDURE — 83880 ASSAY OF NATRIURETIC PEPTIDE: CPT | Performed by: FAMILY MEDICINE

## 2025-05-23 PROCEDURE — 93005 ELECTROCARDIOGRAM TRACING: CPT

## 2025-05-23 PROCEDURE — 71045 X-RAY EXAM CHEST 1 VIEW: CPT

## 2025-05-23 RX ORDER — MAGNESIUM SULFATE HEPTAHYDRATE 40 MG/ML
4 INJECTION, SOLUTION INTRAVENOUS ONCE
Status: COMPLETED | OUTPATIENT
Start: 2025-05-23 | End: 2025-05-23

## 2025-05-23 RX ORDER — SODIUM CHLORIDE 9 MG/ML
3 INJECTION INTRAVENOUS
Status: DISCONTINUED | OUTPATIENT
Start: 2025-05-23 | End: 2025-05-23 | Stop reason: HOSPADM

## 2025-05-23 RX ADMIN — MAGNESIUM SULFATE HEPTAHYDRATE 4 G: 40 INJECTION, SOLUTION INTRAVENOUS at 08:09

## 2025-05-23 NOTE — ED CARE HANDOFF
Emergency Department Sign Out Note        Sign out and transfer of care from Dr. Mock. See Separate Emergency Department note.     The patient, García Varela, was evaluated by the previous provider for dizziness.    Workup Completed:    Labs CT x-ray  ED Course / Workup Pending (followup):    80-year-old male presented with a single episode of feeling lightheaded called EMS found to be hypoxic and hypotensive however quickly resolved patient was given 300 mL of saline everything returned to baseline  Patient has been observed in the ED for 2 hours awake alert oriented GCS 15 is not requiring any oxygen.  Patient states that this had happened to him about a year ago where his magnesium was low once replaced he was back to baseline  Labs reviewed magnesium is 1.5 will replace  Patient is given fluid.  Patient walked to the bathroom without any difficulty or dizziness.  Assessed at bedside multiple time patient is at baseline family at bedside has no concern patient not requesting to be discharged  Disposition discharge home with strict precaution to return worsening worsening symptoms patient verbalized understand the plan DC home      No data recorded                          ED Course as of 05/23/25 0927   Fri May 23, 2025   0805 MAGNESIUM(!): 1.5  replaced     Procedures  Medical Decision Making  Amount and/or Complexity of Data Reviewed  Labs: ordered. Decision-making details documented in ED Course.    Risk  Prescription drug management.            Disposition  Final diagnoses:   Near syncope   Anxiety   Hypomagnesemia     Time reflects when diagnosis was documented in both MDM as applicable and the Disposition within this note       Time User Action Codes Description Comment    5/23/2025  7:02 AM Villa Cody Add [R55] Near syncope     5/23/2025  8:50 AM John Hernández Add [F41.9] Anxiety     5/23/2025  8:50 AM John Hernández Add [E83.42] Hypomagnesemia           ED Disposition       ED Disposition    Discharge    Condition   Stable    Date/Time   Fri May 23, 2025  8:50 AM    Comment   García Varela discharge to home/self care.                   Follow-up Information       Follow up With Specialties Details Why Contact Info    Villa Mari, DO Internal Medicine Call today  [address] [place] [id number] [phone]            Patient's Medications   Discharge Prescriptions    No medications on file     Outpatient Discharge Orders   Ambulatory Referral to Cardiology   Standing Status: Future Standing Exp. Date: 05/23/26      Holter monitor   Standing Status: Future Standing Exp. Date: 05/23/29          ED Provider  Electronically Signed by     John Hernández MD  05/23/25 0936

## 2025-05-23 NOTE — ED PROVIDER NOTES
Time reflects when diagnosis was documented in both MDM as applicable and the Disposition within this note       Time User Action Codes Description Comment    5/23/2025  7:02 AM Villa Cody Add [R55] Near syncope           ED Disposition       None          Assessment & Plan       Medical Decision Making  80-year-old male presented with what appeared to be diaphoresis altered mental status improved greatly with oxygenation supplemental and IV fluid resuscitation.  He is awake and alert at this time we will check CT head CBC chemistry troponin EKG chest normal sinus rhythm no acute changes.  No recent evidence of GI distress fever, no evidence of sepsis dehydration or current alteration in mental status.  Patient will be endorsed to the a.m. ER physician for further evaluation disposition.    Amount and/or Complexity of Data Reviewed  Labs: ordered.  Radiology: ordered.    Risk  Prescription drug management.             Medications   sodium chloride (PF) 0.9 % injection 3 mL (has no administration in time range)       ED Risk Strat Scores                    No data recorded        SBIRT 22yo+      Flowsheet Row Most Recent Value   Initial Alcohol Screen: US AUDIT-C     1. How often do you have a drink containing alcohol? 0 Filed at: 05/23/2025 0657   2. How many drinks containing alcohol do you have on a typical day you are drinking?  0 Filed at: 05/23/2025 0657   3b. FEMALE Any Age, or MALE 65+: How often do you have 4 or more drinks on one occassion? 0 Filed at: 05/23/2025 0657   Audit-C Score 0 Filed at: 05/23/2025 0657                            History of Present Illness       Chief Complaint   Patient presents with    Dizziness     Pt found unresponsive by family with low bp and low o2 sat per ems       Past Medical History[1]   Past Surgical History[2]   Family History[3]   Social History[4]   E-Cigarette/Vaping    E-Cigarette Use Never User       E-Cigarette/Vaping Substances    Nicotine No     THC No      CBD No     Flavoring No     Other No     Unknown No       I have reviewed and agree with the history as documented.     80-year-old male brought in by EMS for an episode of similar responsiveness diaphoresis.  Patient was well this morning getting ready for breakfast take his wife to the doctor's office later when he suddenly became sweaty dizzy diaphoretic said he had a slight headache.  Sat down EMS was called they arrived his blood sugar was over 200 blood pressure 80/60 saturating mid 80s they gave him oxygen IV fluid 300 cc and improved.  He has no chest pain no diarrhea no abdominal pain no recent illness history of hypertension type 2 diabetes no history of heart disease.  No recent fever chills or diarrhea or dysuria.      Dizziness  Associated symptoms: no chest pain, no diarrhea, no nausea, no palpitations, no shortness of breath and no vomiting        Review of Systems   Constitutional:  Positive for diaphoresis. Negative for chills and fever.   HENT:  Negative for ear pain and sore throat.    Eyes:  Negative for pain and visual disturbance.   Respiratory:  Negative for cough and shortness of breath.    Cardiovascular:  Negative for chest pain and palpitations.   Gastrointestinal:  Negative for abdominal pain, diarrhea, nausea and vomiting.   Genitourinary:  Negative for dysuria and hematuria.   Musculoskeletal:  Negative for arthralgias and back pain.   Skin:  Negative for color change and rash.   Neurological:  Positive for dizziness. Negative for seizures and syncope.   All other systems reviewed and are negative.          Objective       ED Triage Vitals [05/23/25 0655]   Temperature Pulse Blood Pressure Resp SpO2 Patient Position - Orthostatic VS   (!) 96.6 °F (35.9 °C) 68 136/90 -- 96 % --      Temp Source Heart Rate Source BP Location FiO2 (%) Pain Score    Temporal Monitor Left arm -- No Pain      Vitals      Date and Time Temp Pulse SpO2 Resp BP Pain Score FACES Pain Rating User   05/23/25  0655 96.6 °F (35.9 °C) 68 96 % -- 136/90 No Pain -- JJ            Physical Exam  Vitals and nursing note reviewed.   Constitutional:       General: He is not in acute distress.     Appearance: Normal appearance. He is well-developed.      Comments: Awake alert normal coherence normal mental status.  No respiratory distress noted.   HENT:      Head: Normocephalic and atraumatic.      Right Ear: External ear normal.      Left Ear: External ear normal.      Nose: Nose normal.      Mouth/Throat:      Mouth: Mucous membranes are moist.     Eyes:      Extraocular Movements: Extraocular movements intact.      Conjunctiva/sclera: Conjunctivae normal.      Pupils: Pupils are equal, round, and reactive to light.       Cardiovascular:      Rate and Rhythm: Normal rate and regular rhythm.      Heart sounds: No murmur heard.  Pulmonary:      Effort: Pulmonary effort is normal. No respiratory distress.      Breath sounds: Normal breath sounds. No wheezing, rhonchi or rales.   Abdominal:      Palpations: Abdomen is soft.      Tenderness: There is no abdominal tenderness.     Musculoskeletal:         General: No swelling.      Cervical back: Neck supple.     Skin:     General: Skin is warm and dry.      Capillary Refill: Capillary refill takes less than 2 seconds.     Neurological:      Mental Status: He is alert.     Psychiatric:         Mood and Affect: Mood normal.         Results Reviewed       Procedure Component Value Units Date/Time    CBC and differential [[id number]]     Lab Status: No result Specimen: Blood     HS Troponin 0hr (reflex protocol) [[id number]]     Lab Status: No result Specimen: Blood     Comprehensive metabolic panel [[id number]]     Lab Status: No result Specimen: Blood             X-ray chest 1 view portable    (Results Pending)   CT head without contrast    (Results Pending)       ECG 12 Lead Documentation Only    Date/Time: 5/23/2025 7:01 AM    Performed by: Villa Cody MD  Authorized by: Villa ARREOLA  MD Escobar    Indications / Diagnosis:  Altered mental status  ECG reviewed by me, the ED Provider: yes    Patient location:  ED  Previous ECG:     Previous ECG:  Unavailable  Interpretation:     Interpretation: normal    Rate:     ECG rate:  64    ECG rate assessment: normal    Rhythm:     Rhythm: sinus rhythm    Ectopy:     Ectopy: none    QRS:     QRS axis:  Right  Conduction:     Conduction: normal    ST segments:     ST segments:  Normal  T waves:     T waves: normal    Comments:      Normal sinus rhythm sinus arrhythmia right axis no acute changes.      ED Medication and Procedure Management   Prior to Admission Medications   Prescriptions Last Dose Informant Patient Reported? Taking?   Coenzyme Q10 10 MG capsule  Self Yes No   Sig: Take 10 mg by mouth daily   Magnesium Chloride (MAGNESIUM DR PO)  Self Yes No   Sig: Take by mouth   Multiple Vitamin (multivitamin) tablet  Self Yes No   Sig: Take 1 tablet by mouth daily   Omega-3 1000 MG CAPS  Self Yes No   Sig: Take by mouth daily   OneTouch Ultra test strip   No No   Sig: USE TO TEST BLOOD SUGAR DAILY   amLODIPine (NORVASC) 2.5 mg tablet   No No   Sig: Take 1 tablet (2.5 mg total) by mouth daily   cholecalciferol 1,000 units tablet  Self Yes No   Sig: Take 1,000 Units by mouth daily   fluticasone (FLONASE) 50 mcg/act nasal spray   No No   Sig: administer 1 spray into each nostril daily   glimepiride (AMARYL) 4 mg tablet   No No   Sig: Take 2 tablets by mouth daily   lisinopril (ZESTRIL) 40 mg tablet   No No   Sig: TAKE 1 TABLET BY MOUTH DAILY.   metFORMIN (GLUCOPHAGE-XR) 500 mg 24 hr tablet  Self No No   Sig: Take 2 tablets (1,000 mg total) by mouth 2 (two) times a day with meals   pantoprazole (PROTONIX) 20 mg tablet   No No   Sig: take one tablet by mouth every day   pioglitazone (ACTOS) 30 mg tablet   No No   Sig: Take 1 tablet by mouth daily   sucralfate (CARAFATE) 1 g tablet   No No   Sig: Take 1 tablet (1 g total) by mouth in the morning Take 1  tablet before meals and at bedtime.  Remember do not take any other medications for 30 to 45 minutes after taking the Carafate.   tamsulosin (FLOMAX) 0.4 mg   No No   Sig: Take 1 capsule by mouth daily with dinner      Facility-Administered Medications: None     Patient's Medications   Discharge Prescriptions    No medications on file     No discharge procedures on file.  ED SEPSIS DOCUMENTATION   Time reflects when diagnosis was documented in both MDM as applicable and the Disposition within this note       Time User Action Codes Description Comment    5/23/2025  7:02 AM Villa Cody Add [R55] Near syncope                    Villa Cody MD  05/23/25 0702         [1]   Past Medical History:  Diagnosis Date    Arthritis of knee, right     LAST ASSESSED: 2/7/17    Diabetes mellitus (HCC)     Does use hearing aid     jesus    Enlarged prostate     GERD (gastroesophageal reflux disease)     Headache     occ    History of gastroesophageal reflux (GERD)     LAST ASSESSED: 5/9/17    Hypertension     Morbid (severe) obesity due to excess calories (HCC)     LAST ASSESSED: 5/10/16    Obesity (BMI 30.0-34.9) 02/02/2016    Pes anserine bursitis     LAST ASSESSED: 11/29/16    Testicular hypogonadism     LAST ASSESSED: 10/30/13    Tick bite     10/27/23 on abdomen    Wears dentures     full uppers   [2]   Past Surgical History:  Procedure Laterality Date    COLONOSCOPY      ELBOW SURGERY Right     HI NEUROPLASTY &/TRANSPOS MEDIAN NRV CARPAL TUNNE Left 11/6/2023    Procedure: RELEASE CARPAL TUNNEL;  Surgeon: Barry Lang DO;  Location: [facility];  Service: Orthopedics    UPPER GASTROINTESTINAL ENDOSCOPY      WRIST FUSION Right    [3]   Family History  Problem Relation Name Age of Onset    Diabetes type II Mother          MELLITUS    Rheumatic fever Mother      Heart disease Mother      No Known Problems Father     [4]   Social History  Tobacco Use    Smoking status: Former     Current packs/day: 0.00     Average  packs/day: 1 pack/day for 20.0 years (20.0 ttl pk-yrs)     Types: Cigarettes     Start date: 3/10/1966     Quit date: 3/10/1986     Years since quittin.2     Passive exposure: Past    Smokeless tobacco: Never   Vaping Use    Vaping status: Never Used   Substance Use Topics    Alcohol use: Not Currently    Drug use: No        Villa Cody MD  25 0703

## 2025-05-23 NOTE — TELEPHONE ENCOUNTER
"Hello,    The following message was sent via e-mail to the leadership team:     Please advise if you can help facilitate the following overbook request:    Patient Name: García Varela     Patient MRN: [id number]     Call back #: [phone]     Insurance: [employer] SIGAL[employer] [facility]     Department:Cardiology    Speciality: General Cardiology    Reason for overbook request: STAT REFERRAL    Comments (Write \"N/a\" if no comments): N/A    Requested doctor and location: Dr. Batista/[facility]    Date of current appointment: 07/09/25      Thank you.      "

## 2025-05-27 ENCOUNTER — APPOINTMENT (OUTPATIENT)
Dept: LAB | Facility: CLINIC | Age: 80
End: 2025-05-27
Payer: COMMERCIAL

## 2025-05-27 ENCOUNTER — OFFICE VISIT (OUTPATIENT)
Dept: INTERNAL MEDICINE CLINIC | Facility: CLINIC | Age: 80
End: 2025-05-27
Payer: COMMERCIAL

## 2025-05-27 VITALS
HEIGHT: 69 IN | BODY MASS INDEX: 36.73 KG/M2 | OXYGEN SATURATION: 98 % | TEMPERATURE: 97.8 F | WEIGHT: 248 LBS | DIASTOLIC BLOOD PRESSURE: 80 MMHG | SYSTOLIC BLOOD PRESSURE: 150 MMHG | HEART RATE: 64 BPM

## 2025-05-27 DIAGNOSIS — E11.9 TYPE 2 DIABETES MELLITUS WITHOUT COMPLICATION, WITHOUT LONG-TERM CURRENT USE OF INSULIN (HCC): ICD-10-CM

## 2025-05-27 DIAGNOSIS — I10 ESSENTIAL HYPERTENSION: ICD-10-CM

## 2025-05-27 DIAGNOSIS — D72.829 LEUKOCYTOSIS, UNSPECIFIED TYPE: ICD-10-CM

## 2025-05-27 DIAGNOSIS — E83.42 HYPOMAGNESEMIA: Primary | ICD-10-CM

## 2025-05-27 DIAGNOSIS — E83.42 HYPOMAGNESEMIA: ICD-10-CM

## 2025-05-27 LAB
ANION GAP SERPL CALCULATED.3IONS-SCNC: 12 MMOL/L (ref 4–13)
BASOPHILS # BLD AUTO: 0.03 THOUSANDS/ÂΜL (ref 0–0.1)
BASOPHILS NFR BLD AUTO: 0 % (ref 0–1)
BUN SERPL-MCNC: 22 MG/DL (ref 5–25)
CALCIUM SERPL-MCNC: 10.4 MG/DL (ref 8.4–10.2)
CHLORIDE SERPL-SCNC: 100 MMOL/L (ref 96–108)
CO2 SERPL-SCNC: 27 MMOL/L (ref 21–32)
CREAT SERPL-MCNC: 0.98 MG/DL (ref 0.6–1.3)
CREAT UR-MCNC: 24.2 MG/DL
EOSINOPHIL # BLD AUTO: 0.19 THOUSAND/ÂΜL (ref 0–0.61)
EOSINOPHIL NFR BLD AUTO: 2 % (ref 0–6)
ERYTHROCYTE [DISTWIDTH] IN BLOOD BY AUTOMATED COUNT: 13.3 % (ref 11.6–15.1)
EST. AVERAGE GLUCOSE BLD GHB EST-MCNC: 166 MG/DL
GFR SERPL CREATININE-BSD FRML MDRD: 73 ML/MIN/1.73SQ M
GLUCOSE P FAST SERPL-MCNC: 99 MG/DL (ref 65–99)
HBA1C MFR BLD: 7.4 %
HCT VFR BLD AUTO: 47.6 % (ref 36.5–49.3)
HGB BLD-MCNC: 15.6 G/DL (ref 12–17)
IMM GRANULOCYTES # BLD AUTO: 0.03 THOUSAND/UL (ref 0–0.2)
IMM GRANULOCYTES NFR BLD AUTO: 0 % (ref 0–2)
LYMPHOCYTES # BLD AUTO: 3.3 THOUSANDS/ÂΜL (ref 0.6–4.47)
LYMPHOCYTES NFR BLD AUTO: 36 % (ref 14–44)
MAGNESIUM SERPL-MCNC: 1.9 MG/DL (ref 1.9–2.7)
MCH RBC QN AUTO: 29.6 PG (ref 26.8–34.3)
MCHC RBC AUTO-ENTMCNC: 32.8 G/DL (ref 31.4–37.4)
MCV RBC AUTO: 90 FL (ref 82–98)
MICROALBUMIN UR-MCNC: 25.1 MG/L
MICROALBUMIN/CREAT 24H UR: 104 MG/G CREATININE (ref 0–30)
MONOCYTES # BLD AUTO: 0.62 THOUSAND/ÂΜL (ref 0.17–1.22)
MONOCYTES NFR BLD AUTO: 7 % (ref 4–12)
NEUTROPHILS # BLD AUTO: 4.97 THOUSANDS/ÂΜL (ref 1.85–7.62)
NEUTS SEG NFR BLD AUTO: 55 % (ref 43–75)
NRBC BLD AUTO-RTO: 0 /100 WBCS
PLATELET # BLD AUTO: 329 THOUSANDS/UL (ref 149–390)
PMV BLD AUTO: 11.4 FL (ref 8.9–12.7)
POTASSIUM SERPL-SCNC: 3.9 MMOL/L (ref 3.5–5.3)
RBC # BLD AUTO: 5.27 MILLION/UL (ref 3.88–5.62)
SODIUM SERPL-SCNC: 139 MMOL/L (ref 135–147)
WBC # BLD AUTO: 9.14 THOUSAND/UL (ref 4.31–10.16)

## 2025-05-27 PROCEDURE — G2211 COMPLEX E/M VISIT ADD ON: HCPCS | Performed by: INTERNAL MEDICINE

## 2025-05-27 PROCEDURE — 82043 UR ALBUMIN QUANTITATIVE: CPT

## 2025-05-27 PROCEDURE — 99214 OFFICE O/P EST MOD 30 MIN: CPT | Performed by: INTERNAL MEDICINE

## 2025-05-27 PROCEDURE — 80048 BASIC METABOLIC PNL TOTAL CA: CPT

## 2025-05-27 PROCEDURE — 83036 HEMOGLOBIN GLYCOSYLATED A1C: CPT

## 2025-05-27 PROCEDURE — 85025 COMPLETE CBC W/AUTO DIFF WBC: CPT

## 2025-05-27 PROCEDURE — 82570 ASSAY OF URINE CREATININE: CPT

## 2025-05-27 PROCEDURE — 83735 ASSAY OF MAGNESIUM: CPT

## 2025-05-27 PROCEDURE — 36415 COLL VENOUS BLD VENIPUNCTURE: CPT

## 2025-05-27 RX ORDER — LISINOPRIL 20 MG/1
20 TABLET ORAL DAILY
Qty: 90 TABLET | Refills: 1 | Status: SHIPPED | OUTPATIENT
Start: 2025-05-27 | End: 2025-11-23

## 2025-05-27 NOTE — ASSESSMENT & PLAN NOTE
Hypotension noted and the patient went to the ER for evaluation (x3).  Evaluation noted a mild leukocytosis and hypomagnesemia  The patient has not been noted to be hypotensive per home BP and BP here.  Refusing Amlodipine and requesting that the Lisinopril be decreased to 20 mg  Holter ordered by the ER and will be done Thursday into Friday.  We will review when completed   Orders:  •  lisinopril (ZESTRIL) 20 mg tablet; Take 1 tablet (20 mg total) by mouth daily

## 2025-05-27 NOTE — PROGRESS NOTES
"Name: García Varela      : 1945      MRN: [id number]  Encounter Provider: Villa Mari DO  Encounter Date: 2025   Encounter department: [facility]  :  Assessment & Plan  Essential hypertension  Hypotension noted and the patient went to the ER for evaluation (x3).  Evaluation noted a mild leukocytosis and hypomagnesemia  The patient has not been noted to be hypotensive per home BP and BP here.  Refusing Amlodipine and requesting that the Lisinopril be decreased to 20 mg  Holter ordered by the ER and will be done Thursday into Friday.  We will review when completed   Orders:  •  lisinopril (ZESTRIL) 20 mg tablet; Take 1 tablet (20 mg total) by mouth daily    Hypomagnesemia  Continue magnesium supplementation  Follow up on serum Mg+  Orders:  •  Magnesium; Future  •  Basic metabolic panel; Future    Type 2 diabetes mellitus without complication, without long-term current use of insulin (HCC)  No hypoglycemia noted and follow up with A1c and Micro-albumin  Lab Results   Component Value Date    HGBA1C 6.8 (H) 2024       Orders:  •  Albumin / creatinine urine ratio; Future  •  Hemoglobin A1C; Future    Leukocytosis, unspecified type  Mild elevation of WBC.  Orders:  •  CBC and differential; Future      Assessment & Plan           History of Present Illness   HPI  Review of Systems    Objective   /80   Pulse 64   Temp 97.8 °F (36.6 °C)   Ht 5' 8.5\" (1.74 m)   Wt 112 kg (248 lb)   SpO2 98%   BMI 37.16 kg/m²      Physical Exam    "

## 2025-05-29 ENCOUNTER — RESULTS FOLLOW-UP (OUTPATIENT)
Dept: INTERNAL MEDICINE CLINIC | Facility: CLINIC | Age: 80
End: 2025-05-29

## 2025-05-29 ENCOUNTER — HOSPITAL ENCOUNTER (OUTPATIENT)
Dept: NON INVASIVE DIAGNOSTICS | Facility: HOSPITAL | Age: 80
Discharge: HOME | End: 2025-05-29
Attending: FAMILY MEDICINE
Payer: COMMERCIAL

## 2025-05-29 DIAGNOSIS — R55 NEAR SYNCOPE: ICD-10-CM

## 2025-05-29 PROCEDURE — 93226 XTRNL ECG REC<48 HR SCAN A/R: CPT

## 2025-05-29 PROCEDURE — 93225 XTRNL ECG REC<48 HRS REC: CPT

## 2025-06-08 ENCOUNTER — RESULTS FOLLOW-UP (OUTPATIENT)
Dept: INTERNAL MEDICINE CLINIC | Facility: CLINIC | Age: 80
End: 2025-06-08

## 2025-06-10 ENCOUNTER — OFFICE VISIT (OUTPATIENT)
Dept: INTERNAL MEDICINE CLINIC | Facility: CLINIC | Age: 80
End: 2025-06-10
Payer: COMMERCIAL

## 2025-06-10 VITALS
TEMPERATURE: 97.3 F | BODY MASS INDEX: 36.29 KG/M2 | HEART RATE: 83 BPM | WEIGHT: 245 LBS | SYSTOLIC BLOOD PRESSURE: 132 MMHG | OXYGEN SATURATION: 98 % | HEIGHT: 69 IN | DIASTOLIC BLOOD PRESSURE: 76 MMHG

## 2025-06-10 DIAGNOSIS — I10 ESSENTIAL HYPERTENSION: Primary | ICD-10-CM

## 2025-06-10 DIAGNOSIS — E11.8 TYPE 2 DIABETES MELLITUS WITH COMPLICATION, WITHOUT LONG-TERM CURRENT USE OF INSULIN (HCC): ICD-10-CM

## 2025-06-10 DIAGNOSIS — E78.00 HYPERCHOLESTEROLEMIA: ICD-10-CM

## 2025-06-10 PROCEDURE — G2211 COMPLEX E/M VISIT ADD ON: HCPCS | Performed by: INTERNAL MEDICINE

## 2025-06-10 PROCEDURE — 99214 OFFICE O/P EST MOD 30 MIN: CPT | Performed by: INTERNAL MEDICINE

## 2025-06-10 NOTE — PROGRESS NOTES
"Name: García Varela      : 1945      MRN: [id number]  Encounter Provider: Villa Mari DO  Encounter Date: 6/10/2025   Encounter department: [facility]  :  Assessment & Plan  Essential hypertension  The patient is doing well with the Lisinopril 20 mg Qday (reduced dose)  No hypotension  Feels good and BP elevated today  Reviewed the Holter  Reviewed the  Echo from Dr Batista       Type 2 diabetes mellitus with complication, without long-term current use of insulin ([facility])  A1c is elevated  States Fasting BG is 110-140 and no hyperglycemia symptoms noted for the rest of the day  Request BG readings from prior to lunch, dinner and bed time also (only one daily)  Continue current medications and no change at this time  Lab Results   Component Value Date    HGBA1C 7.4 (H) 2025            Hypercholesterolemia  Fish oil pills  Lipid panel good and no concerns         Assessment & Plan           History of Present Illness   HPI  Review of Systems   Constitutional:  Negative for chills, fatigue and fever.   HENT: Negative.     Respiratory:  Negative for cough, chest tightness and shortness of breath.    Cardiovascular:  Negative for chest pain and palpitations.   Gastrointestinal:  Negative for abdominal pain, constipation, diarrhea, nausea and vomiting.   Genitourinary: Negative.    Musculoskeletal:  Negative for back pain and myalgias.   Skin: Negative.    Neurological: Negative.    Psychiatric/Behavioral:  Negative for dysphoric mood. The patient is not nervous/anxious.        Objective   /76 (Patient Position: Sitting, Cuff Size: Large)   Pulse 83   Temp (!) 97.3 °F (36.3 °C) (Tympanic)   Ht 5' 8.5\" (1.74 m)   Wt 111 kg (245 lb)   SpO2 98%   BMI 36.71 kg/m²      Physical Exam  Vitals and nursing note reviewed.   Constitutional:       General: He is not in acute distress.     Appearance: He is well-developed.   HENT:      Head: Normocephalic and atraumatic.     Eyes:     "  Conjunctiva/sclera: Conjunctivae normal.       Cardiovascular:      Rate and Rhythm: Normal rate and regular rhythm.      Heart sounds: No murmur heard.  Pulmonary:      Effort: Pulmonary effort is normal. No respiratory distress.      Breath sounds: Normal breath sounds.   Abdominal:      Palpations: Abdomen is soft.      Tenderness: There is no abdominal tenderness.     Musculoskeletal:         General: No swelling.      Cervical back: Neck supple.     Skin:     General: Skin is warm and dry.      Capillary Refill: Capillary refill takes less than 2 seconds.     Neurological:      Mental Status: He is alert.     Psychiatric:         Mood and Affect: Mood normal.

## 2025-06-10 NOTE — ASSESSMENT & PLAN NOTE
A1c is elevated  States Fasting BG is 110-140 and no hyperglycemia symptoms noted for the rest of the day  Request BG readings from prior to lunch, dinner and bed time also (only one daily)  Continue current medications and no change at this time  Lab Results   Component Value Date    HGBA1C 7.4 (H) 05/27/2025

## 2025-06-10 NOTE — ASSESSMENT & PLAN NOTE
The patient is doing well with the Lisinopril 20 mg Qday (reduced dose)  No hypotension  Feels good and BP elevated today  Reviewed the Holter  Reviewed the 5/24 Echo from Dr Batista

## 2025-07-09 ENCOUNTER — OFFICE VISIT (OUTPATIENT)
Dept: CARDIOLOGY CLINIC | Facility: CLINIC | Age: 80
End: 2025-07-09
Payer: COMMERCIAL

## 2025-07-09 VITALS
WEIGHT: 247.8 LBS | HEART RATE: 68 BPM | SYSTOLIC BLOOD PRESSURE: 132 MMHG | BODY MASS INDEX: 36.7 KG/M2 | HEIGHT: 69 IN | DIASTOLIC BLOOD PRESSURE: 80 MMHG

## 2025-07-09 DIAGNOSIS — R55 SYNCOPE, UNSPECIFIED SYNCOPE TYPE: ICD-10-CM

## 2025-07-09 DIAGNOSIS — E78.00 HYPERCHOLESTEROLEMIA: Primary | ICD-10-CM

## 2025-07-09 DIAGNOSIS — R55 NEAR SYNCOPE: ICD-10-CM

## 2025-07-09 DIAGNOSIS — I10 ESSENTIAL HYPERTENSION: ICD-10-CM

## 2025-07-09 PROCEDURE — 99214 OFFICE O/P EST MOD 30 MIN: CPT | Performed by: PHYSICIAN ASSISTANT

## 2025-07-09 NOTE — PROGRESS NOTES
[facility]   Outpatient Note  García Varela  1945  [id number]  [facility] [place] [address] [address] [facility] [address] [phone]    García Varela is a 80 y.o. male    Assessment and Plan:   Hypercholesterolemia  On omega-3 fish oil    Essential hypertension  Blood pressure stable  150/80 sitting  148/82 standing    Continue lisinopril 20 mg daily    Patient was dehydrated and required fluid resuscitation with low blood pressure during syncopal episode.      Syncope  Patient had syncopal episode with low blood pressure and required fluid resuscitation.  He was dehydrated.  He had been drinking a lot of caffeine at that time and now drinks more water and has reduced his caffeine intake.  He has had no recurrence of syncopal episodes.  He now feels terrific and does not have low blood pressure.  Blood pressures as outlined above with no orthostasis.  He leads a very active lifestyle and has had no recurrence of symptoms.    Echocardiogram will be obtained and if there is no change in heart function or wall motion abnormality patient will be seen on an as-needed basis.    It is of note that Holter monitor was unrevealing      Additional Plan:   No Medication changes made today.    Testing as ordered above.    Available lab results are reviewed with the patient as noted.     We will see the patient on an as needed basis only, but would happy to see her at any point in time should it be deemed it necessary in the future.     The patient is encouraged to call if there are questions or concerns.      Subjective:   Patient was seen in the emergency room for syncopal episode.Patient had syncopal episode with low blood pressure and required fluid resuscitation.  He was dehydrated.  He had been drinking a lot of caffeine at that time and now drinks more water and has reduced his caffeine intake.  He has had no recurrence of syncopal  "episodes.  He now feels terrific and does not have low blood pressure.  Blood pressures as outlined above with no orthostasis.  He leads a very active lifestyle and has had no recurrence of symptoms.  He is very active in his yard.  Splitting wood and does not have any recurrence of symptoms.  Holter monitor is unrevealing showing no pauses or bradycardia.  Prior echocardiogram shows preserved LV function without wall motion abnormality.        Social History  Tobacco Use History[1],   Social History     Substance and Sexual Activity   Alcohol Use Not Currently   ,   Social History     Substance and Sexual Activity   Drug Use No     Family History[2]    Medical and Surgical History  Past Medical History[3]  Past Surgical History[4]    Current Medications[5]  Allergies   Allergen Reactions    Pollen Extract Allergic Rhinitis    Tape [Medical Tape] Rash     BANDAID ADHESIVE       Review of Systems   Constitutional: Negative.   HENT: Negative.     Eyes: Negative.    Cardiovascular:  Positive for syncope. Negative for chest pain, claudication, cyanosis, dyspnea on exertion, irregular heartbeat, leg swelling, near-syncope, orthopnea, palpitations and paroxysmal nocturnal dyspnea.   Respiratory: Negative.  Negative for cough, hemoptysis, shortness of breath, sleep disturbances due to breathing, snoring, sputum production and wheezing.    Endocrine: Negative.    Hematologic/Lymphatic: Negative.    Skin: Negative.    Musculoskeletal: Negative.    Gastrointestinal: Negative.    Genitourinary: Negative.    Psychiatric/Behavioral: Negative.     Allergic/Immunologic: Negative.        Objective:   /80   Pulse 68   Ht 5' 8.5\" (1.74 m)   Wt 112 kg (247 lb 12.8 oz)   BMI 37.13 kg/m²   Physical Exam  Vitals and nursing note reviewed.   Constitutional:       Appearance: He is well-developed. He is obese.   HENT:      Head: Normocephalic and atraumatic.      Mouth/Throat:      Mouth: Mucous membranes are moist.     Eyes:     "  General: No scleral icterus.     Conjunctiva/sclera: Conjunctivae normal.     Neck:      Thyroid: No thyromegaly.      Vascular: No JVD.     Cardiovascular:      Rate and Rhythm: Normal rate and regular rhythm.      Heart sounds: Normal heart sounds, S1 normal and S2 normal. No murmur heard.     No friction rub. No gallop.   Pulmonary:      Effort: No respiratory distress.      Breath sounds: No wheezing or rales.   Abdominal:      General: Bowel sounds are normal. There is no distension.      Palpations: Abdomen is soft.      Tenderness: There is no abdominal tenderness.      Comments: Aorta not palpable     Musculoskeletal:         General: No tenderness or deformity. Normal range of motion.      Cervical back: Normal range of motion and neck supple.      Right lower leg: No edema.      Left lower leg: No edema.     Skin:     General: Skin is warm and dry.     Neurological:      General: No focal deficit present.      Mental Status: He is alert and oriented to person, place, and time.     Psychiatric:         Mood and Affect: Mood normal.         Behavior: Behavior normal.         Judgment: Judgment normal.         Lab Review:   Lab Results   Component Value Date    CHOL 146 07/06/2015     Lab Results   Component Value Date    HDL 40 12/02/2024     Lab Results   Component Value Date    LDLCALC 109 (H) 12/02/2024     Lab Results   Component Value Date    TRIG 122 12/02/2024     Results Reviewed       None          Results Reviewed       None          Results Reviewed       None            Recent Cardiovascular Testing:   Holter monitor 5/29/2025: Sinus rhythm average 64 bpm SVT 0.2%, ventricular ectopy rare, no pauses or bradycardia    Echo 5/6/2024: Normal LVEF 60% no WMA RV normal    ECG Review:   5/23/2025: Normal sinus rhythm with sinus arrhythmia rightward axis               [1]   Social History  Tobacco Use   Smoking Status Former    Current packs/day: 0.00    Average packs/day: 1 pack/day for 20.0 years  (20.0 ttl pk-yrs)    Types: Cigarettes    Start date: 3/10/1966    Quit date: 3/10/1986    Years since quittin.3    Passive exposure: Past   Smokeless Tobacco Never   [2]   Family History  Problem Relation Name Age of Onset    Diabetes type II Mother          MELLITUS    Rheumatic fever Mother      Heart disease Mother      No Known Problems Father     [3]   Past Medical History:  Diagnosis Date    Arthritis of knee, right     LAST ASSESSED: 17    Diabetes mellitus (HCC)     Does use hearing aid     jesus    Enlarged prostate     GERD (gastroesophageal reflux disease)     Headache     occ    History of gastroesophageal reflux (GERD)     LAST ASSESSED: 17    Hypertension     Morbid (severe) obesity due to excess calories (HCC)     LAST ASSESSED: 5/10/16    Obesity (BMI 30.0-34.9) 2016    Pes anserine bursitis     LAST ASSESSED: 16    Testicular hypogonadism     LAST ASSESSED: 10/30/13    Tick bite     10/27/23 on abdomen    Wears dentures     full uppers   [4]   Past Surgical History:  Procedure Laterality Date    COLONOSCOPY      ELBOW SURGERY Right     [place] NEUROPLASTY &/TRANSPOS MEDIAN NRV CARPAL TUNNE Left 2023    Procedure: RELEASE CARPAL TUNNEL;  Surgeon: Barry Lang DO;  Location: [facility];  Service: Orthopedics    UPPER GASTROINTESTINAL ENDOSCOPY      WRIST FUSION Right    [5]   Current Outpatient Medications:     cholecalciferol 1,000 units tablet, Take 1,000 Units by mouth in the morning., Disp: , Rfl:     Coenzyme Q10 10 MG capsule, Take 10 mg by mouth in the morning., Disp: , Rfl:     fluticasone (FLONASE) 50 mcg/act nasal spray, administer 1 spray into each nostril daily, Disp: 48 g, Rfl: 1    glimepiride (AMARYL) 4 mg tablet, Take 2 tablets by mouth daily, Disp: 180 tablet, Rfl: 1    lisinopril (ZESTRIL) 20 mg tablet, Take 1 tablet (20 mg total) by mouth daily, Disp: 90 tablet, Rfl: 1    Magnesium Chloride (MAGNESIUM DR PO), Take by mouth, Disp: , Rfl:     metFORMIN  (GLUCOPHAGE-XR) 500 mg 24 hr tablet, Take 2 tablets (1,000 mg total) by mouth 2 (two) times a day with meals, Disp: 360 tablet, Rfl: 3    Omega-3 1000 MG CAPS, Take by mouth in the morning., Disp: , Rfl:     OneTouch Ultra test strip, USE TO TEST BLOOD SUGAR DAILY, Disp: 200 strip, Rfl: 1    pantoprazole (PROTONIX) 20 mg tablet, take one tablet by mouth every day, Disp: 90 tablet, Rfl: 1    pioglitazone (ACTOS) 30 mg tablet, Take 1 tablet by mouth daily, Disp: 100 tablet, Rfl: 1    tamsulosin (FLOMAX) 0.4 mg, Take 1 capsule by mouth daily with dinner, Disp: 100 capsule, Rfl: 1    Multiple Vitamin (multivitamin) tablet, Take 1 tablet by mouth in the morning. (Patient not taking: Reported on 7/9/2025), Disp: , Rfl:     sucralfate (CARAFATE) 1 g tablet, Take 1 tablet (1 g total) by mouth in the morning Take 1 tablet before meals and at bedtime.  Remember do not take any other medications for 30 to 45 minutes after taking the Carafate. (Patient not taking: Reported on 7/9/2025), Disp: 90 tablet, Rfl: 1

## 2025-07-09 NOTE — ASSESSMENT & PLAN NOTE
Blood pressure stable  150/80 sitting  148/82 standing    Continue lisinopril 20 mg daily    Patient was dehydrated and required fluid resuscitation with low blood pressure during syncopal episode.

## 2025-07-09 NOTE — ASSESSMENT & PLAN NOTE
Patient had syncopal episode with low blood pressure and required fluid resuscitation.  He was dehydrated.  He had been drinking a lot of caffeine at that time and now drinks more water and has reduced his caffeine intake.  He has had no recurrence of syncopal episodes.  He now feels terrific and does not have low blood pressure.  Blood pressures as outlined above with no orthostasis.  He leads a very active lifestyle and has had no recurrence of symptoms.    Echocardiogram will be obtained and if there is no change in heart function or wall motion abnormality patient will be seen on an as-needed basis.    It is of note that Holter monitor was unrevealing

## 2025-07-22 ENCOUNTER — HOSPITAL ENCOUNTER (OUTPATIENT)
Dept: NON INVASIVE DIAGNOSTICS | Facility: CLINIC | Age: 80
Discharge: HOME | End: 2025-07-22
Attending: PHYSICIAN ASSISTANT
Payer: COMMERCIAL

## 2025-07-22 DIAGNOSIS — R55 NEAR SYNCOPE: ICD-10-CM

## 2025-07-22 LAB
AORTIC ROOT: 3.4 CM
AORTIC VALVE MEAN VELOCITY: 7.9 M/S
ASCENDING AORTA: 3.3 CM
AV AREA BY CONTINUOUS VTI: 3.1 CM2
AV AREA PEAK VELOCITY: 2.9 CM2
AV LVOT MEAN GRADIENT: 2 MMHG
AV LVOT PEAK GRADIENT: 5 MMHG
AV MEAN PRESS GRAD SYS DOP V1V2: 3 MMHG
AV ORIFICE AREA US: 3.08 CM2
AV PEAK GRADIENT: 6 MMHG
AV VELOCITY RATIO: 0.98
AV VMAX SYS DOP: 1.24 M/S
DOP CALC AO VTI: 24.34 CM
DOP CALC LVOT AREA: 3.14 CM2
DOP CALC LVOT CARDIAC INDEX: 2.17 L/MIN/M2
DOP CALC LVOT CARDIAC OUTPUT: 4.89 L/MIN
DOP CALC LVOT DIAMETER: 2 CM
DOP CALC LVOT PEAK VEL VTI: 23.9 CM
DOP CALC LVOT PEAK VEL: 1.14 M/S
DOP CALC LVOT STROKE INDEX: 33.3 ML/M2
DOP CALC LVOT STROKE VOLUME: 75
E WAVE DECELERATION TIME: 246 MS
E/A RATIO: 0.85
FRACTIONAL SHORTENING: 35 (ref 28–44)
INTERVENTRICULAR SEPTUM IN DIASTOLE (PARASTERNAL SHORT AXIS VIEW): 0.8 CM
INTERVENTRICULAR SEPTUM: 0.8 CM (ref 0.6–1.1)
LAAS-AP2: 21.7 CM2
LAAS-AP4: 16.6 CM2
LEFT ATRIUM SIZE: 3 CM
LEFT ATRIUM VOLUME (MOD BIPLANE): 55 ML
LEFT ATRIUM VOLUME INDEX (MOD BIPLANE): 24.4 ML/M2
LEFT INTERNAL DIMENSION IN SYSTOLE: 3 CM (ref 2.1–4)
LEFT VENTRICULAR INTERNAL DIMENSION IN DIASTOLE: 4.6 CM (ref 3.5–6)
LEFT VENTRICULAR POSTERIOR WALL IN END DIASTOLE: 0.8 CM
LEFT VENTRICULAR STROKE VOLUME: 63 ML
LV EF US.2D.A4C+ESTIMATED: 58 %
LVSV (TEICH): 63 ML
MV E'TISSUE VEL-SEP: 10 CM/S
MV PEAK A VEL: 0.82 M/S
MV PEAK E VEL: 70 CM/S
MV STENOSIS PRESSURE HALF TIME: 71 MS
MV VALVE AREA P 1/2 METHOD: 3.1
RIGHT ATRIUM AREA SYSTOLE A4C: 10.1 CM2
RIGHT VENTRICLE ID DIMENSION: 3.4 CM
SINOTUBULAR JUNCTION: 2.6 CM
SL CV LEFT ATRIUM LENGTH A2C: 5.8 CM
SL CV LV EF: 60
SL CV PED ECHO LEFT VENTRICLE DIASTOLIC VOLUME (MOD BIPLANE) 2D: 97 ML
SL CV PED ECHO LEFT VENTRICLE SYSTOLIC VOLUME (MOD BIPLANE) 2D: 34 ML
STJ: 2.6 CM
TR MAX PG: 24 MMHG
TR PEAK VELOCITY: 2.5 M/S
TRICUSPID ANNULAR PLANE SYSTOLIC EXCURSION: 2.3 CM
TRICUSPID VALVE PEAK REGURGITATION VELOCITY: 2.45 M/S

## 2025-07-22 PROCEDURE — 93306 TTE W/DOPPLER COMPLETE: CPT

## 2025-07-22 PROCEDURE — 93306 TTE W/DOPPLER COMPLETE: CPT | Performed by: INTERNAL MEDICINE

## 2025-07-29 DIAGNOSIS — E11.8 TYPE 2 DIABETES MELLITUS WITH COMPLICATION, WITHOUT LONG-TERM CURRENT USE OF INSULIN (HCC): ICD-10-CM

## 2025-07-30 RX ORDER — METFORMIN HYDROCHLORIDE 500 MG/1
TABLET, EXTENDED RELEASE ORAL
Qty: 360 TABLET | Refills: 3 | Status: SHIPPED | OUTPATIENT
Start: 2025-07-30

## (undated) DEVICE — GLOVE INDICATOR PI UNDERGLOVE SZ 8 BLUE

## (undated) DEVICE — GLOVE INDICATOR PI UNDERGLOVE SZ 7.5 BLUE

## (undated) DEVICE — DECANTER: Brand: UNBRANDED

## (undated) DEVICE — NEEDLE 18 G X 1 1/2 SAFETY

## (undated) DEVICE — SPLINT COMFORT 3 X 12

## (undated) DEVICE — NEEDLE 25G X 5/8 SAFETY

## (undated) DEVICE — GLOVE SRG BIOGEL 7.5

## (undated) DEVICE — SYRINGE 20ML LL

## (undated) DEVICE — PENCIL ROCKER SWITCH CAUTERY HAND CONTROL

## (undated) DEVICE — GLOVE SRG BIOGEL 8

## (undated) DEVICE — ASTOUND FABRIC REINFORCED SURGICAL GOWN: Brand: CONVERTORS

## (undated) DEVICE — PREP SURGICAL PURPREP 26ML

## (undated) DEVICE — INTENDED FOR TISSUE SEPARATION, AND OTHER PROCEDURES THAT REQUIRE A SHARP SURGICAL BLADE TO PUNCTURE OR CUT.: Brand: BARD-PARKER ® CARBON RIB-BACK BLADES

## (undated) DEVICE — URETERAL CATHETER ADAPTOR TIP

## (undated) DEVICE — STOCKINETTE,IMPERVIOUS,12X48,STERILE: Brand: MEDLINE

## (undated) DEVICE — SUT MONOCRYL 4-0 PS-2 27 IN Y426H

## (undated) DEVICE — ZIMMER® STERILE DISPOSABLE TOURNIQUET CUFF, DUAL PORT, SINGLE BLADDER, 18 IN. (46 CM)

## (undated) DEVICE — SUT ETHILON 4-0 PS-2 18 IN 1667H

## (undated) DEVICE — READY WET SKIN SCRUB TRAY-LF: Brand: MEDLINE INDUSTRIES, INC.

## (undated) DEVICE — 3M™ STERI-DRAPE™ U-DRAPE 1015: Brand: STERI-DRAPE™

## (undated) DEVICE — 4-PORT MANIFOLD: Brand: NEPTUNE 2

## (undated) DEVICE — STERILE BETHLEHEM PLASTIC HAND: Brand: CARDINAL HEALTH